# Patient Record
Sex: FEMALE | Race: WHITE | Employment: OTHER | URBAN - METROPOLITAN AREA
[De-identification: names, ages, dates, MRNs, and addresses within clinical notes are randomized per-mention and may not be internally consistent; named-entity substitution may affect disease eponyms.]

---

## 2017-01-05 DIAGNOSIS — E78.5 HYPERLIPIDEMIA, UNSPECIFIED HYPERLIPIDEMIA TYPE: ICD-10-CM

## 2017-01-05 RX ORDER — LISINOPRIL 2.5 MG/1
2.5 TABLET ORAL DAILY
Qty: 30 TABLET | Refills: 0 | Status: SHIPPED | OUTPATIENT
Start: 2017-01-05 | End: 2017-01-23 | Stop reason: SDUPTHER

## 2017-01-05 RX ORDER — METOPROLOL SUCCINATE 25 MG/1
25 TABLET, EXTENDED RELEASE ORAL DAILY
Qty: 30 TABLET | Refills: 0 | Status: SHIPPED | OUTPATIENT
Start: 2017-01-05 | End: 2017-01-23 | Stop reason: SDUPTHER

## 2017-01-05 RX ORDER — ATORVASTATIN CALCIUM 40 MG/1
TABLET, FILM COATED ORAL
Qty: 30 TABLET | Refills: 0 | Status: SHIPPED | OUTPATIENT
Start: 2017-01-05 | End: 2017-01-23 | Stop reason: SDUPTHER

## 2017-01-23 ENCOUNTER — OFFICE VISIT (OUTPATIENT)
Dept: CARDIOLOGY CLINIC | Age: 67
End: 2017-01-23

## 2017-01-23 VITALS
DIASTOLIC BLOOD PRESSURE: 76 MMHG | BODY MASS INDEX: 26.82 KG/M2 | SYSTOLIC BLOOD PRESSURE: 118 MMHG | HEIGHT: 65 IN | HEART RATE: 72 BPM | OXYGEN SATURATION: 97 % | WEIGHT: 161 LBS

## 2017-01-23 DIAGNOSIS — E78.49 OTHER HYPERLIPIDEMIA: ICD-10-CM

## 2017-01-23 DIAGNOSIS — I25.10 CORONARY ARTERY DISEASE INVOLVING NATIVE CORONARY ARTERY OF NATIVE HEART WITHOUT ANGINA PECTORIS: Primary | ICD-10-CM

## 2017-01-23 DIAGNOSIS — I10 ESSENTIAL HYPERTENSION: ICD-10-CM

## 2017-01-23 DIAGNOSIS — E78.5 HYPERLIPIDEMIA, UNSPECIFIED HYPERLIPIDEMIA TYPE: ICD-10-CM

## 2017-01-23 PROCEDURE — 93000 ELECTROCARDIOGRAM COMPLETE: CPT | Performed by: INTERNAL MEDICINE

## 2017-01-23 PROCEDURE — 99214 OFFICE O/P EST MOD 30 MIN: CPT | Performed by: INTERNAL MEDICINE

## 2017-01-23 RX ORDER — LISINOPRIL 2.5 MG/1
2.5 TABLET ORAL DAILY
Qty: 90 TABLET | Refills: 3 | Status: SHIPPED | OUTPATIENT
Start: 2017-01-23 | End: 2018-02-09 | Stop reason: SDUPTHER

## 2017-01-23 RX ORDER — ATORVASTATIN CALCIUM 40 MG/1
TABLET, FILM COATED ORAL
Qty: 30 TABLET | Refills: 11 | Status: SHIPPED | OUTPATIENT
Start: 2017-01-23 | End: 2018-01-30 | Stop reason: SDUPTHER

## 2017-01-23 RX ORDER — METOPROLOL SUCCINATE 25 MG/1
25 TABLET, EXTENDED RELEASE ORAL DAILY
Qty: 90 TABLET | Refills: 3 | Status: SHIPPED | OUTPATIENT
Start: 2017-01-23 | End: 2018-01-30 | Stop reason: SDUPTHER

## 2017-02-10 RX ORDER — NITROGLYCERIN 0.4 MG/1
0.4 TABLET SUBLINGUAL EVERY 5 MIN PRN
Qty: 25 TABLET | Refills: 1 | Status: SHIPPED | OUTPATIENT
Start: 2017-02-10 | End: 2018-10-22 | Stop reason: SDUPTHER

## 2017-05-17 ENCOUNTER — HOSPITAL ENCOUNTER (OUTPATIENT)
Dept: CT IMAGING | Age: 67
Discharge: HOME | End: 2017-05-17
Attending: INTERNAL MEDICINE | Admitting: INTERNAL MEDICINE

## 2017-05-17 DIAGNOSIS — C64.1 RENAL CELL CARCINOMA OF RIGHT KIDNEY (HCC): ICD-10-CM

## 2017-05-17 DIAGNOSIS — C64.1 MALIGNANT NEOPLASM OF RIGHT KIDNEY, EXCEPT RENAL PELVIS (HCC): ICD-10-CM

## 2017-07-17 ENCOUNTER — OFFICE VISIT (OUTPATIENT)
Dept: CARDIOLOGY CLINIC | Age: 67
End: 2017-07-17

## 2017-07-17 ENCOUNTER — HOSPITAL ENCOUNTER (OUTPATIENT)
Dept: GENERAL RADIOLOGY | Age: 67
Discharge: HOME | End: 2017-07-17
Attending: NURSE PRACTITIONER | Admitting: NURSE PRACTITIONER

## 2017-07-17 VITALS
BODY MASS INDEX: 27.32 KG/M2 | WEIGHT: 164 LBS | HEIGHT: 65 IN | HEART RATE: 79 BPM | SYSTOLIC BLOOD PRESSURE: 138 MMHG | DIASTOLIC BLOOD PRESSURE: 72 MMHG | OXYGEN SATURATION: 96 %

## 2017-07-17 DIAGNOSIS — I10 ESSENTIAL HYPERTENSION: ICD-10-CM

## 2017-07-17 DIAGNOSIS — E78.49 OTHER HYPERLIPIDEMIA: ICD-10-CM

## 2017-07-17 DIAGNOSIS — E78.2 MIXED HYPERLIPIDEMIA: ICD-10-CM

## 2017-07-17 DIAGNOSIS — I25.10 CORONARY ARTERY DISEASE INVOLVING NATIVE CORONARY ARTERY OF NATIVE HEART WITHOUT ANGINA PECTORIS: Primary | ICD-10-CM

## 2017-07-17 LAB
A/G RATIO: 1.1 (ref 1.1–2.2)
ALBUMIN SERPL-MCNC: 3.9 G/DL (ref 3.4–5)
ALP BLD-CCNC: 147 U/L (ref 40–129)
ALT SERPL-CCNC: 11 U/L (ref 10–40)
ANION GAP SERPL CALCULATED.3IONS-SCNC: 15 MMOL/L (ref 3–16)
AST SERPL-CCNC: 38 U/L (ref 15–37)
BILIRUB SERPL-MCNC: 0.5 MG/DL (ref 0–1)
BUN BLDV-MCNC: 22 MG/DL (ref 7–20)
CALCIUM SERPL-MCNC: 9.7 MG/DL (ref 8.3–10.6)
CHLORIDE BLD-SCNC: 101 MMOL/L (ref 99–110)
CHOLESTEROL, TOTAL: 146 MG/DL (ref 0–199)
CO2: 20 MMOL/L (ref 21–32)
CREAT SERPL-MCNC: 1 MG/DL (ref 0.6–1.2)
GFR AFRICAN AMERICAN: >60
GFR NON-AFRICAN AMERICAN: 56
GLOBULIN: 3.4 G/DL
GLUCOSE BLD-MCNC: 76 MG/DL (ref 70–99)
HDLC SERPL-MCNC: 60 MG/DL (ref 40–60)
LDL CHOLESTEROL CALCULATED: 68 MG/DL
POTASSIUM SERPL-SCNC: 5.2 MMOL/L (ref 3.5–5.1)
SODIUM BLD-SCNC: 136 MMOL/L (ref 136–145)
TOTAL PROTEIN: 7.3 G/DL (ref 6.4–8.2)
TRIGL SERPL-MCNC: 89 MG/DL (ref 0–150)
VLDLC SERPL CALC-MCNC: 18 MG/DL

## 2017-07-17 PROCEDURE — 99214 OFFICE O/P EST MOD 30 MIN: CPT | Performed by: NURSE PRACTITIONER

## 2017-07-20 ENCOUNTER — TELEPHONE (OUTPATIENT)
Dept: CARDIOLOGY CLINIC | Age: 67
End: 2017-07-20

## 2017-07-20 DIAGNOSIS — R89.9 ABNORMAL LABORATORY TEST RESULT: Primary | ICD-10-CM

## 2017-08-25 ENCOUNTER — HOSPITAL ENCOUNTER (OUTPATIENT)
Dept: GENERAL RADIOLOGY | Age: 67
Discharge: HOME | End: 2017-08-25
Attending: INTERNAL MEDICINE | Admitting: INTERNAL MEDICINE

## 2017-08-25 DIAGNOSIS — R89.9 ABNORMAL LABORATORY TEST RESULT: ICD-10-CM

## 2017-08-25 LAB
A/G RATIO: 1.6 (ref 1.1–2.2)
ALBUMIN SERPL-MCNC: 4.3 G/DL (ref 3.4–5)
ALP BLD-CCNC: 146 U/L (ref 40–129)
ALT SERPL-CCNC: 10 U/L (ref 10–40)
ANION GAP SERPL CALCULATED.3IONS-SCNC: 14 MMOL/L (ref 3–16)
AST SERPL-CCNC: 27 U/L (ref 15–37)
BILIRUB SERPL-MCNC: 0.5 MG/DL (ref 0–1)
BUN BLDV-MCNC: 24 MG/DL (ref 7–20)
CALCIUM SERPL-MCNC: 9.8 MG/DL (ref 8.3–10.6)
CHLORIDE BLD-SCNC: 100 MMOL/L (ref 99–110)
CO2: 27 MMOL/L (ref 21–32)
CREAT SERPL-MCNC: 1 MG/DL (ref 0.6–1.2)
GFR AFRICAN AMERICAN: >60
GFR NON-AFRICAN AMERICAN: 56
GLOBULIN: 2.7 G/DL
GLUCOSE BLD-MCNC: 80 MG/DL (ref 70–99)
POTASSIUM SERPL-SCNC: 4.9 MMOL/L (ref 3.5–5.1)
SODIUM BLD-SCNC: 141 MMOL/L (ref 136–145)
TOTAL PROTEIN: 7 G/DL (ref 6.4–8.2)

## 2017-08-28 ENCOUNTER — TELEPHONE (OUTPATIENT)
Dept: CARDIOLOGY CLINIC | Age: 67
End: 2017-08-28

## 2017-11-13 ENCOUNTER — HOSPITAL ENCOUNTER (OUTPATIENT)
Dept: CT IMAGING | Age: 67
Discharge: HOME | End: 2017-11-13
Attending: INTERNAL MEDICINE | Admitting: INTERNAL MEDICINE

## 2017-11-13 DIAGNOSIS — C64.1 MALIGNANT NEOPLASM OF RIGHT KIDNEY, EXCEPT RENAL PELVIS (HCC): ICD-10-CM

## 2017-11-13 DIAGNOSIS — C64.1 RENAL CELL CARCINOMA OF RIGHT KIDNEY (HCC): ICD-10-CM

## 2018-01-30 DIAGNOSIS — E78.5 HYPERLIPIDEMIA, UNSPECIFIED HYPERLIPIDEMIA TYPE: ICD-10-CM

## 2018-01-31 RX ORDER — ATORVASTATIN CALCIUM 40 MG/1
TABLET, FILM COATED ORAL
Qty: 30 TABLET | Refills: 11 | Status: SHIPPED | OUTPATIENT
Start: 2018-01-31 | End: 2018-02-22 | Stop reason: SDUPTHER

## 2018-01-31 RX ORDER — METOPROLOL SUCCINATE 25 MG/1
25 TABLET, EXTENDED RELEASE ORAL DAILY
Qty: 90 TABLET | Refills: 0 | Status: SHIPPED | OUTPATIENT
Start: 2018-01-31 | End: 2018-02-22 | Stop reason: SDUPTHER

## 2018-02-12 RX ORDER — LISINOPRIL 2.5 MG/1
TABLET ORAL
Qty: 30 TABLET | Refills: 2 | Status: SHIPPED | OUTPATIENT
Start: 2018-02-12 | End: 2018-02-22 | Stop reason: SDUPTHER

## 2018-02-22 ENCOUNTER — OFFICE VISIT (OUTPATIENT)
Dept: CARDIOLOGY CLINIC | Age: 68
End: 2018-02-22

## 2018-02-22 VITALS
HEART RATE: 75 BPM | DIASTOLIC BLOOD PRESSURE: 78 MMHG | SYSTOLIC BLOOD PRESSURE: 124 MMHG | WEIGHT: 166 LBS | BODY MASS INDEX: 27.66 KG/M2 | OXYGEN SATURATION: 97 % | HEIGHT: 65 IN

## 2018-02-22 DIAGNOSIS — I10 ESSENTIAL HYPERTENSION: ICD-10-CM

## 2018-02-22 DIAGNOSIS — I25.10 CORONARY ARTERY DISEASE INVOLVING NATIVE CORONARY ARTERY OF NATIVE HEART WITHOUT ANGINA PECTORIS: Primary | ICD-10-CM

## 2018-02-22 DIAGNOSIS — E78.5 HYPERLIPIDEMIA, UNSPECIFIED HYPERLIPIDEMIA TYPE: ICD-10-CM

## 2018-02-22 DIAGNOSIS — I51.9 LEFT VENTRICULAR DYSFUNCTION: ICD-10-CM

## 2018-02-22 PROCEDURE — 99214 OFFICE O/P EST MOD 30 MIN: CPT | Performed by: INTERNAL MEDICINE

## 2018-02-22 RX ORDER — METOPROLOL SUCCINATE 25 MG/1
25 TABLET, EXTENDED RELEASE ORAL DAILY
Qty: 90 TABLET | Refills: 3 | Status: SHIPPED | OUTPATIENT
Start: 2018-02-22 | End: 2019-03-12 | Stop reason: SDUPTHER

## 2018-02-22 RX ORDER — LISINOPRIL 2.5 MG/1
TABLET ORAL
Qty: 90 TABLET | Refills: 3 | Status: SHIPPED | OUTPATIENT
Start: 2018-02-22 | End: 2019-04-09 | Stop reason: SDUPTHER

## 2018-02-22 RX ORDER — ATORVASTATIN CALCIUM 40 MG/1
TABLET, FILM COATED ORAL
Qty: 90 TABLET | Refills: 3 | Status: CANCELLED | OUTPATIENT
Start: 2018-02-22

## 2018-02-22 RX ORDER — ATORVASTATIN CALCIUM 40 MG/1
TABLET, FILM COATED ORAL
Qty: 30 TABLET | Refills: 11 | Status: SHIPPED | OUTPATIENT
Start: 2018-02-22 | End: 2019-03-12 | Stop reason: SDUPTHER

## 2018-02-22 NOTE — PROGRESS NOTES
Section of Cardiology                                     Cardiovascular Evaluation      PATIENT: Vandana Bill  DATE: 2018  MRN: [id number]  CSN: [id number]  : 1950    Primary Care Doctor: Alondra Teixeira MD    Reason for evaluation:   6 Month Follow-Up and Coronary Artery Disease    History of present illness:  Vandana Bill is a 74 y.o. patient who presents for hospital follow up of chest pain. She underwent a stress test 6/22/15 which was abnormal.  Cardiac cath 6/22/15 HERNANDO to LAD and Diag2 with balloon PTCA. Today she states that she feels well from a cardiac standpoint. Denies further episodes of chest pain. She also denied dyspnea, edema or palpitations. Since the last visit she has been diagnosed with large right renal mass and underwent right radical nephrectomy on 16. Pathology showed it was renal cell carcinoma. Today she is here for follow up of CAD, HTN, HLD. She reports she feels well overall. She denies chest pain, shortness of breath, dizziness or syncope. Past Medical History:   has a past medical history of Arthritis; CAD (coronary artery disease); Cancer ([address] Chest pain; Family history of early CAD; Hyperlipidemia; Hypertension; and Knee pain. Surgical History:   has a past surgical history that includes  section; Percutaneous Transluminal Coronary Angio (2015); eye surgery; Coronary angioplasty with stent (2015); Cardiac surgery; and total nephrectomy (Right, 16). Social History:   reports that she has never smoked. She has never used smokeless tobacco. She reports that she does not drink alcohol or use drugs. Family History:  No evidence for sudden cardiac death or premature CAD    Home Medications:  Reviewed and are listed in nursing record.  and/or listed below  Current Outpatient Prescriptions   Medication Sig Dispense Refill    lisinopril (PRINIVIL;ZESTRIL) 2.5 MG tablet TAKE ONE TABLET BY MOUTH DAILY 30 tablet 2    metoprolol succinate (TOPROL XL) 25 MG extended release tablet Take 1 tablet by mouth daily 90 tablet 0    atorvastatin (LIPITOR) 40 MG tablet TAKE 1 TABLET BY MOUTH ONE TIME A DAY 30 tablet 11    nitroGLYCERIN (NITROSTAT) 0.4 MG SL tablet Place 1 tablet under the tongue every 5 minutes as needed for Chest pain 25 tablet 1    Coenzyme Q10 (CO Q 10 PO) Take by mouth      vitamin D (CHOLECALCIFEROL) 1000 UNIT TABS tablet Take 1,000 Units by mouth daily 3-4 times a week      aspirin 81 MG chewable tablet Take 1 tablet by mouth daily 30 tablet 3    Multiple Vitamin (MULTIVITAMINS PO) Take  by mouth. No current facility-administered medications for this visit. Allergies:  Neosporin [bacitracin-neomycin-polymyxin]     Review of Systems:   All 14 point review of symptoms completed. Pertinent positives identified in the HPI, all other review of symptoms negative.     Physical Examination:    /78   Pulse 75   Ht 5' 5\" (1.651 m)   Wt 166 lb (75.3 kg)   SpO2 97%   BMI 27.62 kg/m²          General Appearance:  Alert, cooperative, no distress, appears stated age       Head:  Normocephalic, without obvious abnormality, atraumatic   Eyes:  PERRL, conjunctiva/corneas clear       Nose: Nares normal, no drainage or sinus tenderness   Throat: Lips, mucosa, and tongue normal       Neck: Supple, symmetrical, trachea midline, no adenopathy, thyroid: not enlarged, symmetric, no tenderness/mass/nodules, no carotid bruit or JVD       Lungs:   Clear to auscultation bilaterally, respirations unlabored   Chest Wall:  No tenderness or deformity       Heart:  Regular rhythm and normal rate; S1, S2 are normal; no murmur noted; no rub or gallop       Abdomen:   Soft, non-tender, bowel sounds active all four quadrants,  no masses, no organomegaly       Extremities: Extremities normal, atraumatic, no cyanosis or edema   Pulses: 2+ and symmetric       Skin: Skin color, texture, turgor normal, no

## 2018-02-22 NOTE — PATIENT INSTRUCTIONS
Recommendation:  - She is doing well with no chest pain. She will continue low dose Asa, Toprol, Lisinopril as well as moderate dose Lipitor. Last LDL in July 17' was at goal. Will repeat her lipid panel in July. - Her BP is controlled on Lisinopril. - I will see her back in 12 months.

## 2018-05-10 ENCOUNTER — HOSPITAL ENCOUNTER (OUTPATIENT)
Dept: CT IMAGING | Age: 68
Discharge: HOME | End: 2018-05-10
Attending: NURSE PRACTITIONER | Admitting: NURSE PRACTITIONER

## 2018-05-10 DIAGNOSIS — C64.1: ICD-10-CM

## 2018-05-10 DIAGNOSIS — N28.89 KIDNEY MASS: ICD-10-CM

## 2018-10-22 ENCOUNTER — OFFICE VISIT (OUTPATIENT)
Dept: CARDIOLOGY CLINIC | Age: 68
End: 2018-10-22
Payer: COMMERCIAL

## 2018-10-22 VITALS
OXYGEN SATURATION: 97 % | HEIGHT: 65 IN | WEIGHT: 164.6 LBS | HEART RATE: 66 BPM | DIASTOLIC BLOOD PRESSURE: 70 MMHG | BODY MASS INDEX: 27.42 KG/M2 | SYSTOLIC BLOOD PRESSURE: 110 MMHG

## 2018-10-22 DIAGNOSIS — E78.49 OTHER HYPERLIPIDEMIA: Primary | ICD-10-CM

## 2018-10-22 DIAGNOSIS — E78.5 HYPERLIPIDEMIA, UNSPECIFIED HYPERLIPIDEMIA TYPE: ICD-10-CM

## 2018-10-22 DIAGNOSIS — I10 ESSENTIAL HYPERTENSION: ICD-10-CM

## 2018-10-22 DIAGNOSIS — I25.10 CORONARY ARTERY DISEASE INVOLVING NATIVE CORONARY ARTERY OF NATIVE HEART WITHOUT ANGINA PECTORIS: ICD-10-CM

## 2018-10-22 PROCEDURE — 99214 OFFICE O/P EST MOD 30 MIN: CPT | Performed by: INTERNAL MEDICINE

## 2018-10-22 RX ORDER — NITROGLYCERIN 0.4 MG/1
0.4 TABLET SUBLINGUAL EVERY 5 MIN PRN
Qty: 25 TABLET | Refills: 1 | Status: SHIPPED | OUTPATIENT
Start: 2018-10-22

## 2018-10-22 NOTE — LETTER
[address] Cardiology - [facility] [place] [id number]  Phone: [phone]  Fax: [phone]    Nino Babin MD        2018     Kenji العلي, [facility]    Patient: Dora Bell  MR Number: [id number]  YOB: 1950  Date of Visit: 10/22/2018    Dear Dr. Kenji العلي:     Below are the relevant portions of my assessment and plan of care.         [address]   Cardiovascular Evaluation     PATIENT: Isa Russelll: 10/22/2018  MRN: [id number]  CSN: [id number]  : 1950     Primary Care Doctor: Kenji العلي MD     Reason for evaluation/Chief complaint:   Chest Pain (2 weeks ago previous patient of Dr. Kathleen Mendiola)        Subjective:     History of present illness on initial date of evaluation:              Dora Bell is a 79 y.o. patient who presents for follow up. She was previously followed by Dr. Kathleen Mendiola for coronary artery disease involving an abnormal stress test 2015 and an angiogram and stents to her LAD and Diag 2, balloon PTCA 2015. Her echocardiogram from 10/2015 showed an ejection fraction of 55-60%. Today she reports that she had an episode of chest pain a few weeks ago. She attributes this to the weather at the time. She also reports that she was working outside in her yard. She has had no recurrence since then. She tolerates activity well, walking her dog daily. Kendy Womacksantosgerman reports that she has been taking her medications as prescribed.      Assessment:  79 y.o. patient with:      Problem List Items Addressed This Visit      HLD (hyperlipidemia) - Primary     Coronary artery disease involving native coronary artery of native heart without angina pectoris     HTN (hypertension)             Plan:  1. I recommend that the patient continue their currently prescribed medications.  Their drug modifiable risk factors appear to be well

## 2018-10-22 NOTE — PROGRESS NOTES
[address] [facility]   Cardiovascular Evaluation    PATIENT: Hans Restrepo  DATE: 10/22/2018  MRN: [id number]  CSN: [id number]  : 1950    Primary Care Doctor: Alla Mcclellan MD    Reason for evaluation/Chief complaint:   Chest Pain (2 weeks ago previous patient of Dr. Miguel Collins)      Subjective:    History of present illness on initial date of evaluation:   Hans Restrepo is a 79 y.o. patient who presents for follow up. She was previously followed by Dr. Miguel Collins for coronary artery disease involving an abnormal stress test 2015 and an angiogram and stents to her LAD and Diag 2, balloon PTCA 2015. Her echocardiogram from 10/2015 showed an ejection fraction of 55-60%. Today she reports that she had an episode of chest pain a few weeks ago. She attributes this to the weather at the time. She also reports that she was working outside in her yard. She has had no recurrence since then. She tolerates activity well, walking her dog daily. Deya Addy reports that she has been taking her medications as prescribed. Patient Active Problem List   Diagnosis    Localized osteoarthrosis, lower leg    Chondromalacia of patella    Knee pain    BP (high blood pressure)    HLD (hyperlipidemia)    Tear of lateral cartilage or meniscus of knee, current    Chest pain    Coronary artery disease involving native coronary artery of native heart without angina pectoris    HTN (hypertension)    S/P PTCA (percutaneous transluminal coronary angioplasty)    Urinary retention    Hematuria    Acute blood loss anemia    Renal mass    Renal cell carcinoma of right kidney (HCC)    Iron deficiency anemia due to chronic blood loss         Cardiac Testing: I have reviewed the findings below. EKG:  ECHO:   STRESS TEST:  CATH:  BYPASS:  VASCULAR:    Past Medical History:   has a past medical history of Arthritis; CAD (coronary artery disease); Cancer ([facility] [address] Chest pain; Family history of early CAD;  Hyperlipidemia; obtained from the patient  General ROS: negative for - chills, fever or night sweats  Psychological ROS: negative for - disorientation or hallucinations  Ophthalmic ROS: negative for - dry eyes, eye pain or loss of vision  ENT ROS: negative for - nasal discharge or sore throat  Allergy and Immunology ROS: negative for - hives or itchy/watery eyes  Hematological and Lymphatic ROS: negative for - jaundice or night sweats  Endocrine ROS: negative for - mood swings or temperature intolerance  Breast ROS: deferred  Respiratory ROS: negative for - hemoptysis or stridor  Cardiovascular ROS: negative for - chest pain, dyspnea on exertion or palpitations  Gastrointestinal ROS: no abdominal pain, change in bowel habits, or black or bloody stools  Genito-Urinary ROS: no dysuria, trouble voiding, or hematuria  Musculoskeletal ROS: negative for - gait disturbance, joint pain or joint stiffness  Neurological ROS: negative for - seizures or speech problems  Dermatological ROS: negative for - rash or skin lesion changes      Physical Examination:    /70   Pulse 66   Ht 5' 5\" (1.651 m)   Wt 164 lb 9.6 oz (74.7 kg)   SpO2 97%   BMI 27.39 kg/m²   /70   Pulse 66   Ht 5' 5\" (1.651 m)   Wt 164 lb 9.6 oz (74.7 kg)   SpO2 97%   BMI 27.39 kg/m²    Weight: 164 lb 9.6 oz (74.7 kg)     Wt Readings from Last 3 Encounters:   10/22/18 164 lb 9.6 oz (74.7 kg)   02/22/18 166 lb (75.3 kg)   02/02/18 150 lb (68 kg)     No intake or output data in the 24 hours ending 10/22/18 1106    General Appearance:  Alert, cooperative, no distress, appears stated age   Head:  Normocephalic, without obvious abnormality, atraumatic   Eyes:  PERRL, conjunctiva/corneas clear       Nose: Nares normal, no drainage or sinus tenderness   Throat: Lips, mucosa, and tongue normal   Neck: Supple, symmetrical, trachea midline, no adenopathy, thyroid: not enlarged, symmetric, no tenderness/mass/nodules, no carotid bruit or JVD       Lungs:   Clear to This note was scribed in the presence of Blane Gunter MD, by Kamla Major RN.      I, Dr. Blane Gunter, personally performed the services described in this documentation, as scribed by the above signed scribe in my presence. It is both accurate and complete to my knowledge. I agree with the details independently gathered by the clinical support staff, while the remaining scribed note accurately describes my personal service to the patient. All questions and concerns were addressed to the patient/family. Alternatives to my treatment were discussed. The note was completed using EMR. Every effort was made to ensure accuracy; however, inadvertent computerized transcription errors may be present.     Blane Gunter MD, [address], [place], [place]  [phone] Critical access hospital  [phone] [facility]  10/22/2018  11:06 AM

## 2018-11-19 ENCOUNTER — HOSPITAL ENCOUNTER (OUTPATIENT)
Dept: CT IMAGING | Age: 68
Discharge: HOME | End: 2018-11-19
Payer: COMMERCIAL

## 2018-11-19 DIAGNOSIS — N28.89 RENAL MASS: ICD-10-CM

## 2018-11-19 DIAGNOSIS — R91.1 LUNG NODULE: ICD-10-CM

## 2018-11-19 PROCEDURE — 74176 CT ABD & PELVIS W/O CONTRAST: CPT

## 2018-11-19 PROCEDURE — 71250 CT THORAX DX C-: CPT

## 2019-03-12 DIAGNOSIS — E78.5 HYPERLIPIDEMIA, UNSPECIFIED HYPERLIPIDEMIA TYPE: ICD-10-CM

## 2019-03-12 DIAGNOSIS — I10 HYPERTENSION, UNSPECIFIED TYPE: Primary | ICD-10-CM

## 2019-03-12 RX ORDER — METOPROLOL SUCCINATE 25 MG/1
25 TABLET, EXTENDED RELEASE ORAL DAILY
Qty: 90 TABLET | Refills: 3 | Status: SHIPPED | OUTPATIENT
Start: 2019-03-12 | End: 2020-03-06

## 2019-03-12 RX ORDER — ATORVASTATIN CALCIUM 40 MG/1
TABLET, FILM COATED ORAL
Qty: 90 TABLET | Refills: 3 | Status: SHIPPED | OUTPATIENT
Start: 2019-03-12 | End: 2019-05-20 | Stop reason: SDUPTHER

## 2019-04-08 ENCOUNTER — TELEPHONE (OUTPATIENT)
Dept: CARDIOLOGY CLINIC | Age: 69
End: 2019-04-08

## 2019-04-08 NOTE — TELEPHONE ENCOUNTER
Pt needs refill of lisinopril (PRINIVIL;ZESTRIL) 2.5 MG tablet sent to [facility] and [employer] . Last ov 10/22/18. Pt states she is out of the med.

## 2019-04-09 RX ORDER — LISINOPRIL 2.5 MG/1
TABLET ORAL
Qty: 90 TABLET | Refills: 3 | Status: SHIPPED | OUTPATIENT
Start: 2019-04-09 | End: 2020-04-06

## 2019-05-20 ENCOUNTER — HOSPITAL ENCOUNTER (OUTPATIENT)
Dept: CT IMAGING | Age: 69
Discharge: HOME | End: 2019-05-20
Payer: MEDICARE

## 2019-05-20 DIAGNOSIS — C64.1 RENAL CELL CARCINOMA OF RIGHT KIDNEY (HCC): ICD-10-CM

## 2019-05-20 DIAGNOSIS — E78.5 HYPERLIPIDEMIA, UNSPECIFIED HYPERLIPIDEMIA TYPE: ICD-10-CM

## 2019-05-20 PROCEDURE — 6360000004 HC RX CONTRAST MEDICATION: Performed by: INTERNAL MEDICINE

## 2019-05-20 PROCEDURE — 74176 CT ABD & PELVIS W/O CONTRAST: CPT

## 2019-05-20 RX ADMIN — IOHEXOL 50 ML: 240 INJECTION, SOLUTION INTRATHECAL; INTRAVASCULAR; INTRAVENOUS; ORAL at 14:36

## 2019-05-21 RX ORDER — ATORVASTATIN CALCIUM 40 MG/1
TABLET, FILM COATED ORAL
Qty: 90 TABLET | Refills: 5 | Status: SHIPPED | OUTPATIENT
Start: 2019-05-21 | End: 2020-06-15

## 2019-08-23 ENCOUNTER — HOSPITAL ENCOUNTER (EMERGENCY)
Age: 69
Discharge: HOME | DRG: 563 | End: 2019-08-23
Payer: MEDICARE

## 2019-08-23 ENCOUNTER — APPOINTMENT (OUTPATIENT)
Dept: GENERAL RADIOLOGY | Age: 69
DRG: 563 | End: 2019-08-23
Payer: MEDICARE

## 2019-08-23 ENCOUNTER — APPOINTMENT (OUTPATIENT)
Dept: CT IMAGING | Age: 69
DRG: 563 | End: 2019-08-23
Payer: MEDICARE

## 2019-08-23 VITALS
OXYGEN SATURATION: 100 % | BODY MASS INDEX: 24.99 KG/M2 | HEART RATE: 61 BPM | HEIGHT: 65 IN | DIASTOLIC BLOOD PRESSURE: 68 MMHG | SYSTOLIC BLOOD PRESSURE: 148 MMHG | RESPIRATION RATE: 18 BRPM | TEMPERATURE: 97 F | WEIGHT: 150 LBS
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
## 2019-08-23 DIAGNOSIS — S82.141A TIBIAL PLATEAU FRACTURE, RIGHT, CLOSED, INITIAL ENCOUNTER: ICD-10-CM

## 2019-08-23 DIAGNOSIS — S82.831A CLOSED FRACTURE FIBULA, HEAD, RIGHT, INITIAL ENCOUNTER: Primary | ICD-10-CM

## 2019-08-23 PROCEDURE — 73700 CT LOWER EXTREMITY W/O DYE: CPT

## 2019-08-23 PROCEDURE — 6370000000 HC RX 637 (ALT 250 FOR IP): Performed by: NURSE PRACTITIONER

## 2019-08-23 PROCEDURE — 73590 X-RAY EXAM OF LOWER LEG: CPT

## 2019-08-23 PROCEDURE — 99284 EMERGENCY DEPT VISIT MOD MDM: CPT

## 2019-08-23 RX ORDER — HYDROCODONE BITARTRATE AND ACETAMINOPHEN 5; 325 MG/1; MG/1
1 TABLET ORAL EVERY 6 HOURS PRN
Qty: 10 TABLET | Refills: 0 | Status: ON HOLD | OUTPATIENT
Start: 2019-08-23 | End: 2019-08-26 | Stop reason: HOSPADM

## 2019-08-23 RX ORDER — HYDROCODONE BITARTRATE AND ACETAMINOPHEN 5; 325 MG/1; MG/1
1 TABLET ORAL ONCE
Status: COMPLETED | OUTPATIENT
Start: 2019-08-23 | End: 2019-08-23

## 2019-08-23 RX ADMIN — HYDROCODONE BITARTRATE AND ACETAMINOPHEN 1 TABLET: 5; 325 TABLET ORAL at 15:41

## 2019-08-23 ASSESSMENT — ENCOUNTER SYMPTOMS
COLOR CHANGE: 0
BACK PAIN: 0
ABDOMINAL PAIN: 0
WHEEZING: 0
COUGH: 0
SHORTNESS OF BREATH: 0
DIARRHEA: 0
VOMITING: 0
NAUSEA: 0

## 2019-08-23 ASSESSMENT — PAIN DESCRIPTION - LOCATION: LOCATION: LEG

## 2019-08-23 ASSESSMENT — PAIN SCALES - GENERAL
PAINLEVEL_OUTOF10: 7
PAINLEVEL_OUTOF10: 7
PAINLEVEL_OUTOF10: 5

## 2019-08-23 ASSESSMENT — PAIN DESCRIPTION - DESCRIPTORS: DESCRIPTORS: CONSTANT

## 2019-08-23 ASSESSMENT — PAIN DESCRIPTION - ORIENTATION: ORIENTATION: RIGHT;LOWER

## 2019-08-23 ASSESSMENT — PAIN DESCRIPTION - PAIN TYPE: TYPE: ACUTE PAIN

## 2019-08-23 NOTE — ED PROVIDER NOTES
lipohemarthrosis. 4. Mild lateral compartment degenerative changes. XR TIBIA FIBULA RIGHT (2 VIEWS)   Final Result   Closed comminuted intra-articular fracture of the proximal right tibia with   involvement of the lateral tibial plateau and possibly the medial tibial   plateau. Dedicated right knee radiographs recommended. Closed comminuted fracture of the head/neck of the right fibula. Xr Tibia Fibula Right (2 Views)    Result Date: 8/23/2019  EXAMINATION: 3 XRAY VIEWS OF THE RIGHT TIBIA AND FIBULA 8/23/2019 2:14 pm COMPARISON: None. HISTORY: ORDERING SYSTEM PROVIDED HISTORY: fall from trailer twisting leg TECHNOLOGIST PROVIDED HISTORY: Reason for exam:->fall from trailer twisting leg Reason for Exam: Patient fell off back of boat trailer and twisted leg - pain is mostly at upper fibula (lateral side) Acuity: Acute Type of Exam: Initial FINDINGS: There is a comminuted intra-articular fracture of the lateral tibial plateau which extends to the tibial spine. There is also a subjacent comminuted closed fracture of the head/neck of the right fibula. The ankle mortise is intact. The bones are osteopenic. Fracture of the medial tibial plateau is also likely present. Dedicated knee radiographs are recommended. Closed comminuted intra-articular fracture of the proximal right tibia with involvement of the lateral tibial plateau and possibly the medial tibial plateau. Dedicated right knee radiographs recommended. Closed comminuted fracture of the head/neck of the right fibula.          MEDICAL DECISION MAKING / ED COURSE:      PROCEDURES:   Procedures    None    Patient was given:  Medications   HYDROcodone-acetaminophen (NORCO) 5-325 MG per tablet 1 tablet (1 tablet Oral Given 8/23/19 9038)     Patient presents today with right proximal tib/fib pain, states that she fell onto the ground, when she went to step back off of the boat trailer, she states that when she fell, she landed on her visit well. I evaluated the patient. The physician was available for consultation as needed. The patient and / or the family were informed of the results of anytests, a time was given to answer questions, a plan was proposed and they agreed with plan. She verbalized understanding of discharge instructions and was discharged from the department in stable condition. CLINICAL IMPRESSION:  1. Closed fracture fibula, head, right, initial encounter    2. Tibial plateau fracture, right, closed, initial encounter    3. Fall, initial encounter        DISPOSITION Decision To Discharge 08/23/2019 05:23:15 PM      PATIENT REFERRED TO:  Wilner Ware MD  [address] [place] [address] [address] [facility] [address]  [phone]    Schedule an appointment as soon as possible for a visit   As needed    Garrett Hobbs MD  [facility] [address] [facility] [address]  [phone]    Schedule an appointment as soon as possible for a visit in 2 days  This is a referral to Ortho, please call Monday make an appointment for reevaluation      DISCHARGE MEDICATIONS:  Discharge Medication List as of 8/23/2019  5:25 PM      START taking these medications    Details   HYDROcodone-acetaminophen (NORCO) 5-325 MG per tablet Take 1 tablet by mouth every 6 hours as needed for Pain for up to 3 days. , Disp-10 tablet, R-0Print             DISCONTINUED MEDICATIONS:  Discharge Medication List as of 8/23/2019  5:25 PM      STOP taking these medications       vitamin D (CHOLECALCIFEROL) 1000 UNIT TABS tablet Comments:   Reason for Stopping:         Multiple Vitamin (MULTIVITAMINS PO) Comments:   Reason for Stopping:                      (Please note the MDM and HPI sections of this note were completed with a voice recognition program.  Efforts weremade to edit the dictations but occasionally words are mis-transcribed.)    Electronically signed, INNA Ramsey CNP,           INNA Ramsey CNP  08/23/19 4105

## 2019-08-24 ENCOUNTER — APPOINTMENT (OUTPATIENT)
Dept: GENERAL RADIOLOGY | Age: 69
DRG: 563 | End: 2019-08-24
Payer: MEDICARE

## 2019-08-24 ENCOUNTER — HOSPITAL ENCOUNTER (INPATIENT)
Age: 69
LOS: 3 days | Discharge: HOME | DRG: 563 | End: 2019-08-27
Attending: EMERGENCY MEDICINE | Admitting: INTERNAL MEDICINE
Payer: MEDICARE

## 2019-08-24 DIAGNOSIS — S82.141D CLOSED FRACTURE OF RIGHT TIBIAL PLATEAU WITH ROUTINE HEALING, SUBSEQUENT ENCOUNTER: Primary | ICD-10-CM

## 2019-08-24 DIAGNOSIS — S82.831D CLOSED FRACTURE OF HEAD OF RIGHT FIBULA WITH ROUTINE HEALING, SUBSEQUENT ENCOUNTER: ICD-10-CM

## 2019-08-24 PROBLEM — S82.401D TIBIA/FIBULA FRACTURE, RIGHT, CLOSED, WITH ROUTINE HEALING, SUBSEQUENT ENCOUNTER: Status: ACTIVE | Noted: 2019-08-24

## 2019-08-24 PROBLEM — S82.409A TIBIA/FIBULA FRACTURE: Status: ACTIVE | Noted: 2019-08-24

## 2019-08-24 PROBLEM — S82.209A TIBIA/FIBULA FRACTURE: Status: ACTIVE | Noted: 2019-08-24

## 2019-08-24 PROBLEM — S82.201D TIBIA/FIBULA FRACTURE, RIGHT, CLOSED, WITH ROUTINE HEALING, SUBSEQUENT ENCOUNTER: Status: ACTIVE | Noted: 2019-08-24

## 2019-08-24 LAB
ANION GAP SERPL CALCULATED.3IONS-SCNC: 13 MMOL/L (ref 3–16)
BASOPHILS ABSOLUTE: 0 K/UL (ref 0–0.2)
BASOPHILS RELATIVE PERCENT: 0.2 %
BUN BLDV-MCNC: 30 MG/DL (ref 7–20)
CALCIUM SERPL-MCNC: 9.1 MG/DL (ref 8.3–10.6)
CHLORIDE BLD-SCNC: 101 MMOL/L (ref 99–110)
CO2: 23 MMOL/L (ref 21–32)
CREAT SERPL-MCNC: 1.1 MG/DL (ref 0.6–1.2)
EOSINOPHILS ABSOLUTE: 0 K/UL (ref 0–0.6)
EOSINOPHILS RELATIVE PERCENT: 0.2 %
GFR AFRICAN AMERICAN: 60
GFR NON-AFRICAN AMERICAN: 50
GLUCOSE BLD-MCNC: 138 MG/DL (ref 70–99)
HCT VFR BLD CALC: 32.2 % (ref 36–48)
HEMOGLOBIN: 11.2 G/DL (ref 12–16)
INR BLD: 1.25 (ref 0.86–1.14)
IRON SATURATION: 15 % (ref 15–50)
IRON: 38 UG/DL (ref 37–145)
LYMPHOCYTES ABSOLUTE: 0.6 K/UL (ref 1–5.1)
LYMPHOCYTES RELATIVE PERCENT: 8.8 %
MCH RBC QN AUTO: 31.4 PG (ref 26–34)
MCHC RBC AUTO-ENTMCNC: 34.9 G/DL (ref 31–36)
MCV RBC AUTO: 90.2 FL (ref 80–100)
MONOCYTES ABSOLUTE: 0.8 K/UL (ref 0–1.3)
MONOCYTES RELATIVE PERCENT: 12.1 %
NEUTROPHILS ABSOLUTE: 5.1 K/UL (ref 1.7–7.7)
NEUTROPHILS RELATIVE PERCENT: 78.7 %
PDW BLD-RTO: 14 % (ref 12.4–15.4)
PLATELET # BLD: 134 K/UL (ref 135–450)
PMV BLD AUTO: 9.4 FL (ref 5–10.5)
POTASSIUM REFLEX MAGNESIUM: 4.1 MMOL/L (ref 3.5–5.1)
PROTHROMBIN TIME: 14.2 SEC (ref 9.8–13)
RBC # BLD: 3.57 M/UL (ref 4–5.2)
SODIUM BLD-SCNC: 137 MMOL/L (ref 136–145)
TOTAL IRON BINDING CAPACITY: 261 UG/DL (ref 260–445)
WBC # BLD: 6.4 K/UL (ref 4–11)

## 2019-08-24 PROCEDURE — 99284 EMERGENCY DEPT VISIT MOD MDM: CPT

## 2019-08-24 PROCEDURE — 83550 IRON BINDING TEST: CPT

## 2019-08-24 PROCEDURE — 96375 TX/PRO/DX INJ NEW DRUG ADDON: CPT

## 2019-08-24 PROCEDURE — 36415 COLL VENOUS BLD VENIPUNCTURE: CPT

## 2019-08-24 PROCEDURE — 82306 VITAMIN D 25 HYDROXY: CPT

## 2019-08-24 PROCEDURE — 6370000000 HC RX 637 (ALT 250 FOR IP): Performed by: PHYSICIAN ASSISTANT

## 2019-08-24 PROCEDURE — 6360000002 HC RX W HCPCS: Performed by: NURSE PRACTITIONER

## 2019-08-24 PROCEDURE — 2580000003 HC RX 258: Performed by: NURSE PRACTITIONER

## 2019-08-24 PROCEDURE — 73552 X-RAY EXAM OF FEMUR 2/>: CPT

## 2019-08-24 PROCEDURE — 96374 THER/PROPH/DIAG INJ IV PUSH: CPT

## 2019-08-24 PROCEDURE — 6360000002 HC RX W HCPCS: Performed by: EMERGENCY MEDICINE

## 2019-08-24 PROCEDURE — 83540 ASSAY OF IRON: CPT

## 2019-08-24 PROCEDURE — 6360000002 HC RX W HCPCS: Performed by: PHYSICIAN ASSISTANT

## 2019-08-24 PROCEDURE — 6370000000 HC RX 637 (ALT 250 FOR IP): Performed by: NURSE PRACTITIONER

## 2019-08-24 PROCEDURE — 80048 BASIC METABOLIC PNL TOTAL CA: CPT

## 2019-08-24 PROCEDURE — 1200000000 HC SEMI PRIVATE

## 2019-08-24 PROCEDURE — 6370000000 HC RX 637 (ALT 250 FOR IP): Performed by: INTERNAL MEDICINE

## 2019-08-24 PROCEDURE — 85610 PROTHROMBIN TIME: CPT

## 2019-08-24 PROCEDURE — 85025 COMPLETE CBC W/AUTO DIFF WBC: CPT

## 2019-08-24 RX ORDER — LISINOPRIL 2.5 MG/1
2.5 TABLET ORAL DAILY
Status: DISCONTINUED | OUTPATIENT
Start: 2019-08-24 | End: 2019-08-27 | Stop reason: HOSPADM

## 2019-08-24 RX ORDER — HYDROCODONE BITARTRATE AND ACETAMINOPHEN 5; 325 MG/1; MG/1
1 TABLET ORAL EVERY 6 HOURS PRN
Status: DISCONTINUED | OUTPATIENT
Start: 2019-08-24 | End: 2019-08-24

## 2019-08-24 RX ORDER — HYDROCODONE BITARTRATE AND ACETAMINOPHEN 5; 325 MG/1; MG/1
2 TABLET ORAL EVERY 4 HOURS PRN
Status: DISCONTINUED | OUTPATIENT
Start: 2019-08-24 | End: 2019-08-27 | Stop reason: HOSPADM

## 2019-08-24 RX ORDER — SODIUM CHLORIDE 0.9 % (FLUSH) 0.9 %
10 SYRINGE (ML) INJECTION EVERY 12 HOURS SCHEDULED
Status: DISCONTINUED | OUTPATIENT
Start: 2019-08-24 | End: 2019-08-27 | Stop reason: HOSPADM

## 2019-08-24 RX ORDER — CYCLOBENZAPRINE HCL 10 MG
10 TABLET ORAL 3 TIMES DAILY PRN
Status: DISCONTINUED | OUTPATIENT
Start: 2019-08-24 | End: 2019-08-25

## 2019-08-24 RX ORDER — ATORVASTATIN CALCIUM 40 MG/1
40 TABLET, FILM COATED ORAL NIGHTLY
Status: DISCONTINUED | OUTPATIENT
Start: 2019-08-24 | End: 2019-08-27 | Stop reason: HOSPADM

## 2019-08-24 RX ORDER — SODIUM CHLORIDE 0.9 % (FLUSH) 0.9 %
10 SYRINGE (ML) INJECTION PRN
Status: DISCONTINUED | OUTPATIENT
Start: 2019-08-24 | End: 2019-08-27 | Stop reason: HOSPADM

## 2019-08-24 RX ORDER — ASPIRIN 81 MG/1
81 TABLET, CHEWABLE ORAL DAILY
Status: DISCONTINUED | OUTPATIENT
Start: 2019-08-24 | End: 2019-08-27 | Stop reason: HOSPADM

## 2019-08-24 RX ORDER — ONDANSETRON 2 MG/ML
4 INJECTION INTRAMUSCULAR; INTRAVENOUS ONCE
Status: DISCONTINUED | OUTPATIENT
Start: 2019-08-24 | End: 2019-08-24

## 2019-08-24 RX ORDER — FENTANYL CITRATE 50 UG/ML
50 INJECTION, SOLUTION INTRAMUSCULAR; INTRAVENOUS ONCE
Status: COMPLETED | OUTPATIENT
Start: 2019-08-24 | End: 2019-08-24

## 2019-08-24 RX ORDER — SODIUM CHLORIDE 9 MG/ML
INJECTION, SOLUTION INTRAVENOUS CONTINUOUS
Status: DISCONTINUED | OUTPATIENT
Start: 2019-08-24 | End: 2019-08-25

## 2019-08-24 RX ORDER — ONDANSETRON 2 MG/ML
4 INJECTION INTRAMUSCULAR; INTRAVENOUS EVERY 6 HOURS PRN
Status: DISCONTINUED | OUTPATIENT
Start: 2019-08-24 | End: 2019-08-27 | Stop reason: HOSPADM

## 2019-08-24 RX ORDER — METOPROLOL SUCCINATE 25 MG/1
25 TABLET, EXTENDED RELEASE ORAL DAILY
Status: DISCONTINUED | OUTPATIENT
Start: 2019-08-24 | End: 2019-08-27 | Stop reason: HOSPADM

## 2019-08-24 RX ORDER — HYDROCODONE BITARTRATE AND ACETAMINOPHEN 5; 325 MG/1; MG/1
1 TABLET ORAL EVERY 4 HOURS PRN
Status: DISCONTINUED | OUTPATIENT
Start: 2019-08-24 | End: 2019-08-27 | Stop reason: HOSPADM

## 2019-08-24 RX ORDER — DIAZEPAM 5 MG/1
5 TABLET ORAL ONCE
Status: COMPLETED | OUTPATIENT
Start: 2019-08-24 | End: 2019-08-24

## 2019-08-24 RX ORDER — ONDANSETRON 2 MG/ML
4 INJECTION INTRAMUSCULAR; INTRAVENOUS ONCE
Status: COMPLETED | OUTPATIENT
Start: 2019-08-24 | End: 2019-08-24

## 2019-08-24 RX ADMIN — HYDROMORPHONE HYDROCHLORIDE 0.5 MG: 1 INJECTION, SOLUTION INTRAMUSCULAR; INTRAVENOUS; SUBCUTANEOUS at 07:33

## 2019-08-24 RX ADMIN — DIAZEPAM 5 MG: 5 TABLET ORAL at 03:11

## 2019-08-24 RX ADMIN — ENOXAPARIN SODIUM 40 MG: 40 INJECTION SUBCUTANEOUS at 09:11

## 2019-08-24 RX ADMIN — CYCLOBENZAPRINE HYDROCHLORIDE 10 MG: 10 TABLET, FILM COATED ORAL at 21:44

## 2019-08-24 RX ADMIN — CYCLOBENZAPRINE HYDROCHLORIDE 10 MG: 10 TABLET, FILM COATED ORAL at 10:28

## 2019-08-24 RX ADMIN — HYDROMORPHONE HYDROCHLORIDE 1 MG: 1 INJECTION, SOLUTION INTRAMUSCULAR; INTRAVENOUS; SUBCUTANEOUS at 04:29

## 2019-08-24 RX ADMIN — VITAMIN D, TAB 1000IU (100/BT) 1000 UNITS: 25 TAB at 14:09

## 2019-08-24 RX ADMIN — HYDROMORPHONE HYDROCHLORIDE 0.5 MG: 1 INJECTION, SOLUTION INTRAMUSCULAR; INTRAVENOUS; SUBCUTANEOUS at 14:10

## 2019-08-24 RX ADMIN — HYDROMORPHONE HYDROCHLORIDE 0.5 MG: 1 INJECTION, SOLUTION INTRAMUSCULAR; INTRAVENOUS; SUBCUTANEOUS at 02:22

## 2019-08-24 RX ADMIN — METOPROLOL SUCCINATE 25 MG: 25 TABLET, EXTENDED RELEASE ORAL at 09:11

## 2019-08-24 RX ADMIN — SODIUM CHLORIDE: 9 INJECTION, SOLUTION INTRAVENOUS at 17:42

## 2019-08-24 RX ADMIN — HYDROCODONE BITARTRATE AND ACETAMINOPHEN 1 TABLET: 5; 325 TABLET ORAL at 10:28

## 2019-08-24 RX ADMIN — LISINOPRIL 2.5 MG: 2.5 TABLET ORAL at 09:10

## 2019-08-24 RX ADMIN — HYDROMORPHONE HYDROCHLORIDE 0.5 MG: 1 INJECTION, SOLUTION INTRAMUSCULAR; INTRAVENOUS; SUBCUTANEOUS at 20:57

## 2019-08-24 RX ADMIN — SODIUM CHLORIDE: 9 INJECTION, SOLUTION INTRAVENOUS at 04:45

## 2019-08-24 RX ADMIN — CYCLOBENZAPRINE HYDROCHLORIDE 10 MG: 10 TABLET, FILM COATED ORAL at 04:52

## 2019-08-24 RX ADMIN — HYDROCODONE BITARTRATE AND ACETAMINOPHEN 1 TABLET: 5; 325 TABLET ORAL at 10:41

## 2019-08-24 RX ADMIN — ONDANSETRON 4 MG: 2 INJECTION INTRAMUSCULAR; INTRAVENOUS at 02:22

## 2019-08-24 RX ADMIN — ASPIRIN 81 MG 81 MG: 81 TABLET ORAL at 09:11

## 2019-08-24 RX ADMIN — Medication 10 ML: at 09:12

## 2019-08-24 RX ADMIN — FENTANYL CITRATE 50 MCG: 50 INJECTION, SOLUTION INTRAMUSCULAR; INTRAVENOUS at 03:11

## 2019-08-24 RX ADMIN — HYDROCODONE BITARTRATE AND ACETAMINOPHEN 2 TABLET: 5; 325 TABLET ORAL at 17:38

## 2019-08-24 RX ADMIN — ATORVASTATIN CALCIUM 40 MG: 40 TABLET, FILM COATED ORAL at 21:44

## 2019-08-24 ASSESSMENT — PAIN SCALES - GENERAL
PAINLEVEL_OUTOF10: 10
PAINLEVEL_OUTOF10: 8
PAINLEVEL_OUTOF10: 10
PAINLEVEL_OUTOF10: 7
PAINLEVEL_OUTOF10: 7
PAINLEVEL_OUTOF10: 8
PAINLEVEL_OUTOF10: 10
PAINLEVEL_OUTOF10: 7
PAINLEVEL_OUTOF10: 6
PAINLEVEL_OUTOF10: 7
PAINLEVEL_OUTOF10: 7
PAINLEVEL_OUTOF10: 8
PAINLEVEL_OUTOF10: 7
PAINLEVEL_OUTOF10: 10

## 2019-08-24 ASSESSMENT — ENCOUNTER SYMPTOMS
COLOR CHANGE: 1
ABDOMINAL PAIN: 0
SHORTNESS OF BREATH: 0
NAUSEA: 1
VOMITING: 0
EYES NEGATIVE: 1

## 2019-08-24 ASSESSMENT — PAIN DESCRIPTION - PAIN TYPE
TYPE: ACUTE PAIN

## 2019-08-24 ASSESSMENT — PAIN DESCRIPTION - ORIENTATION
ORIENTATION: RIGHT
ORIENTATION: LOWER;RIGHT
ORIENTATION: LOWER;RIGHT
ORIENTATION: RIGHT
ORIENTATION: RIGHT;LOWER

## 2019-08-24 ASSESSMENT — PAIN DESCRIPTION - LOCATION
LOCATION: LEG

## 2019-08-24 ASSESSMENT — PAIN DESCRIPTION - DESCRIPTORS: DESCRIPTORS: CONSTANT

## 2019-08-24 NOTE — DISCHARGE INSTR - COC
Restriction: {CHP DME Yes amt example:946594354}  Last Modified Barium Swallow with Video (Video Swallowing Test): {Done Not Done TASHA:796191617}    Treatments at the Time of Hospital Discharge:   Respiratory Treatments: ***  Oxygen Therapy:  {Therapy; copd oxygen:97921}  Ventilator:    {MH CC Vent ICNN:562138262}    Rehab Therapies: PT, OT  Weight Bearing Status/Restrictions: [facility] CC Weight Bearin}  Other Medical Equipment (for information only, NOT a DME order):  {EQUIPMENT:765915615}  Other Treatments: ***    Patient's personal belongings (please select all that are sent with patient):  {CHP DME Belongings:591601571}    RN SIGNATURE:  {Esignature:[id number]}    CASE MANAGEMENT/SOCIAL WORK SECTION    Inpatient Status Date: ***    Readmission Risk Assessment Score:  Readmission Risk              Risk of Unplanned Readmission:        14           Discharging to Facility/ Agency   Name:  [facility] care    Address: [address] [place] [address], [address] [place] [address]  Phone: [phone]  Fax: [phone]    ·     / signature: {Esignature:[id number]}    PHYSICIAN SECTION    Prognosis: Good    Condition at Discharge: Stable    Rehab Potential (if transferring to Rehab): Good    Recommended Labs or Other Treatments After Discharge: None    Physician Certification: I certify the above information and transfer of Alok Irby  is necessary for the continuing treatment of the diagnosis listed and that she requires Home Care for less 30 days.      Update Admission H&P: No change in H&P    PHYSICIAN SIGNATURE:  Electronically signed by Yvette Conti MD on 19 at 2:13 PM

## 2019-08-24 NOTE — CONSULTS
seconds. Positive toe wiggle. No pain with passive range of motion of the toes. Pain with active motion of the ankle, but able to wiggle her toes. Left lower extremity: No signs of trauma. No pain with palpation range of motion and neurovascularly intact    Bilateral upper extremities: No pain with range of motion or palpation and neurovascular exam within normal limits    DATA:      CBC with Differential:    Lab Results   Component Value Date    WBC 6.4 08/24/2019    RBC 3.57 08/24/2019    RBC 4.57 11/22/2016    HGB 11.2 08/24/2019    HCT 32.2 08/24/2019     08/24/2019    MCV 90.2 08/24/2019    MCH 31.4 08/24/2019    MCHC 34.9 08/24/2019    RDW 14.0 08/24/2019    LYMPHOPCT 8.8 08/24/2019    LYMPHOPCT 37.8 11/22/2016    MONOPCT 12.1 08/24/2019    BASOPCT 0.2 08/24/2019    MONOSABS 0.8 08/24/2019    LYMPHSABS 0.6 08/24/2019    EOSABS 0.0 08/24/2019    BASOSABS 0.0 08/24/2019     CMP:    Lab Results   Component Value Date     08/24/2019    K 4.1 08/24/2019     08/24/2019    CO2 23 08/24/2019    BUN 30 08/24/2019    CREATININE 1.1 08/24/2019    GFRAA 60 08/24/2019    AGRATIO 1.6 08/25/2017    LABGLOM 50 08/24/2019    GLUCOSE 138 08/24/2019    GLUCOSE 102 08/24/2016    PROT 7.0 08/25/2017    PROT 6.8 08/24/2016    CALCIUM 9.1 08/24/2019    BILITOT 0.5 08/25/2017    ALKPHOS 146 08/25/2017    AST 27 08/25/2017    ALT 10 08/25/2017     BMP:    Lab Results   Component Value Date     08/24/2019    K 4.1 08/24/2019     08/24/2019    CO2 23 08/24/2019    BUN 30 08/24/2019    CREATININE 1.1 08/24/2019    CALCIUM 9.1 08/24/2019    GFRAA 60 08/24/2019    LABGLOM 50 08/24/2019    GLUCOSE 138 08/24/2019    GLUCOSE 102 08/24/2016     Protime 14. 2High   9.8 - 13.0 sec Final 08/24/2019  1:57 AM [facility]   Effective 5-31-18 09:00am EST   Please note reference ranges have   changed for PT and INR Testing. INR 1. 25High   0.86 - 1.14 Final 08/24/2019  1:57 AM [facility]         Radiology:   CT OF THE RIGHT KNEE WITHOUT CONTRAST 8/23/2019 4:14 pm       TECHNIQUE:   CT of the right knee was performed without the administration of intravenous   contrast.  Multiplanar reformatted images are provided for review. Dose   modulation, iterative reconstruction, and/or weight based adjustment of the   mA/kV was utilized to reduce the radiation dose to as low as reasonably   achievable.       COMPARISON:   Right tibia/fibula radiographs 08/23/2019.       HISTORY   ORDERING SYSTEM PROVIDED HISTORY: fall, tib fib fx   TECHNOLOGIST PROVIDED HISTORY:   Reason for Exam: fell today, known fractures from plain films, no prev surgery   Acuity: Acute   Type of Exam: Initial       FINDINGS:   Bones: There is an acute mildly comminuted fracture involving the anterior   and posterior aspects of the lateral tibial plateau with up to 4 mm   depression of the subchondral bone plate in the posterior aspect.  The   fracture extends the tibial spine mildly comminuted fracture components   involving the central and posterior aspects of medial tibial plateau with up   to approximately 2 mm depression of the subchondral bone plate.  The fracture   involves the posterior aspect of the proximal tibial metaphysis.       An acute comminuted mildly displaced fibular head and neck fracture is also   seen.  No other fracture identified.  No dislocation.       Soft Tissue:  The extensor mechanism is intact.  Periarticular soft tissue   edema.  No radiopaque foreign body or soft tissue gas.       Joint:  Large lipohemarthrosis.  No popliteal cyst.  Mild lateral compartment   degenerative changes.  No joint body.           Impression   1. Acute mildly comminuted and depressed fracture of the medial and lateral   tibial plateau as detailed above. 2. Acute comminuted mildly displaced fibular head and neck fracture. 3. Large lipohemarthrosis. 4. Mild lateral compartment degenerative changes.        3 XRAY VIEWS OF THE

## 2019-08-24 NOTE — ED PROVIDER NOTES
(\"from pain\"). Negative for abdominal pain and vomiting. Genitourinary: Negative. Musculoskeletal: Positive for arthralgias (right knee), gait problem, joint swelling (right knee) and myalgias (right leg). Skin: Positive for color change (bruising to right leg). Negative for wound. Neurological: Negative for numbness. All other systems reviewed and are negative. Exceptas noted above in the ROS, all other systems were reviewed and negative. PAST MEDICAL HISTORY:     Past Medical History:   Diagnosis Date    Arthritis     legs and knees    CAD (coronary artery disease)     Cancer (HCC)     kidney cancer    Chest pain 2015    + Troponins/ Abn Stress Test/ PTCA    Family history of early CAD     Hyperlipidemia     Hypertension     Knee pain     right         SURGICAL HISTORY:      Past Surgical History:   Procedure Laterality Date    CARDIAC SURGERY       SECTION      CORONARY ANGIOPLASTY WITH STENT PLACEMENT  2015    EYE SURGERY      PTCA  2015    95% occlusion- pLAD, HERNANDO 2.5x 16    TOTAL NEPHRECTOMY Right 16         CURRENT MEDICATIONS:       Previous Medications    ASPIRIN 81 MG CHEWABLE TABLET    Take 1 tablet by mouth daily    ATORVASTATIN (LIPITOR) 40 MG TABLET    TAKE 1 TABLET BY MOUTH ONE TIME A DAY    COENZYME Q10 (CO Q 10 PO)    Take by mouth    HYDROCODONE-ACETAMINOPHEN (NORCO) 5-325 MG PER TABLET    Take 1 tablet by mouth every 6 hours as needed for Pain for up to 3 days.     LISINOPRIL (PRINIVIL;ZESTRIL) 2.5 MG TABLET    TAKE ONE TABLET BY MOUTH DAILY    METOPROLOL SUCCINATE (TOPROL XL) 25 MG EXTENDED RELEASE TABLET    Take 1 tablet by mouth daily    NITROGLYCERIN (NITROSTAT) 0.4 MG SL TABLET    Place 1 tablet under the tongue every 5 minutes as needed for Chest pain         ALLERGIES:    Neosporin [bacitracin-neomycin-polymyxin]    FAMILY HISTORY:       Family History   Problem Relation Age of Onset    Diabetes Father     Heart Disease scleral icterus. PERRL. EOM's grossly intact. NECK: Supple. Normal ROM. CARDIOVASCULAR: RRR. No Murmer. Intact distal pulses. PULMONARY/CHEST WALL: Effort normal. No tachypnea. Lungs clear to ausculation. ABDOMEN: Normal BS. Soft. Nondistended. No tenderness to palpate. No guarding. /ANORECTAL: Not assessed  MUSKULOSKELETAL: Right lower extremity: There is a moderate swelling to the knee and proximal tib/fib. Obvious joint effusion noted. Significant decrease in range of motion of knee secondary to pain. The patient has exquisite tenderness to the proximal tip/fib where there is market swelling though still compressibility of muscle groups/compartments. Increased pain with attempted range of motion of ankle. Patient able to wiggle her toes. 2+ pedal pulse palpated. Sensation intact distal to fracture site. No acute deformities. SKIN: Warm and dry. No rash. Bruising beginning to develop around the proximal right tib-fib. Skin remains intact. NEUROLOGICAL: Alert and oriented x 3. GCS 15. CN II-XII grossly intact. Strength is 5/5 in all extremities (with the exception of right lower extremity which is difficult to assess due to injury/pain) and sensation is intact.    PSYCHIATRIC: Normal affect        DIAGNOSTICRESULTS:     LABS:    Results for orders placed or performed during the hospital encounter of 08/24/19   CBC Auto Differential   Result Value Ref Range    WBC 6.4 4.0 - 11.0 K/uL    RBC 3.57 (L) 4.00 - 5.20 M/uL    Hemoglobin 11.2 (L) 12.0 - 16.0 g/dL    Hematocrit 32.2 (L) 36.0 - 48.0 %    MCV 90.2 80.0 - 100.0 fL    MCH 31.4 26.0 - 34.0 pg    MCHC 34.9 31.0 - 36.0 g/dL    RDW 14.0 12.4 - 15.4 %    Platelets 951 (L) 736 - 450 K/uL    MPV 9.4 5.0 - 10.5 fL    Neutrophils % 78.7 %    Lymphocytes % 8.8 %    Monocytes % 12.1 %    Eosinophils % 0.2 %    Basophils % 0.2 %    Neutrophils Absolute 5.1 1.7 - 7.7 K/uL    Lymphocytes Absolute 0.6 (L) 1.0 - 5.1 K/uL    Monocytes Absolute 0.8 0.0 - 1.3

## 2019-08-24 NOTE — PROGRESS NOTES
4 Eyes Skin Assessment     The patient is being assess for   Admission    I agree that 2 RN's have performed a thorough Head to Toe Skin Assessment on the patient. ALL assessment sites listed below have been assessed. Areas assessed by both nurses:   [x]   Head, Face, and Ears   [x]   Shoulders, Back, and Chest, Abdomen  [x]   Arms, Elbows, and Hands   [x]   Coccyx, Sacrum, and Ischium  [x]   Legs, Feet, and Heels        Mepilex placed on sacrum for prevention    **SHARE this note so that the co-signing nurse is able to place an eSignature**    Co-signer eSignature: Electronically signed by Darwin Wilson RN on 8/24/19 at 6:32 AM    Does the Patient have Skin Breakdown?   No          Killian Prevention initiated:  No   Wound Care Orders initiated:  No      [facility] nurse consulted for Pressure Injury (Stage 3,4, Unstageable, DTI, NWPT, Complex wounds)and New or Established Ostomies:  No      Primary Nurse eSignature: Electronically signed by Marcus Jaimes RN on 8/24/19 at 4:31 AM

## 2019-08-25 LAB
ANION GAP SERPL CALCULATED.3IONS-SCNC: 8 MMOL/L (ref 3–16)
BASOPHILS ABSOLUTE: 0 K/UL (ref 0–0.2)
BASOPHILS RELATIVE PERCENT: 0.3 %
BUN BLDV-MCNC: 23 MG/DL (ref 7–20)
CALCIUM SERPL-MCNC: 8.5 MG/DL (ref 8.3–10.6)
CHLORIDE BLD-SCNC: 103 MMOL/L (ref 99–110)
CO2: 24 MMOL/L (ref 21–32)
CREAT SERPL-MCNC: 0.9 MG/DL (ref 0.6–1.2)
EOSINOPHILS ABSOLUTE: 0 K/UL (ref 0–0.6)
EOSINOPHILS RELATIVE PERCENT: 0.5 %
GFR AFRICAN AMERICAN: >60
GFR NON-AFRICAN AMERICAN: >60
GLUCOSE BLD-MCNC: 106 MG/DL (ref 70–99)
HCT VFR BLD CALC: 30 % (ref 36–48)
HEMOGLOBIN: 10.2 G/DL (ref 12–16)
LYMPHOCYTES ABSOLUTE: 0.6 K/UL (ref 1–5.1)
LYMPHOCYTES RELATIVE PERCENT: 10.4 %
MCH RBC QN AUTO: 31.3 PG (ref 26–34)
MCHC RBC AUTO-ENTMCNC: 34.1 G/DL (ref 31–36)
MCV RBC AUTO: 91.8 FL (ref 80–100)
MONOCYTES ABSOLUTE: 0.6 K/UL (ref 0–1.3)
MONOCYTES RELATIVE PERCENT: 10 %
NEUTROPHILS ABSOLUTE: 4.8 K/UL (ref 1.7–7.7)
NEUTROPHILS RELATIVE PERCENT: 78.8 %
PDW BLD-RTO: 14.1 % (ref 12.4–15.4)
PLATELET # BLD: 121 K/UL (ref 135–450)
PMV BLD AUTO: 8.8 FL (ref 5–10.5)
POTASSIUM REFLEX MAGNESIUM: 4.8 MMOL/L (ref 3.5–5.1)
RBC # BLD: 3.27 M/UL (ref 4–5.2)
SODIUM BLD-SCNC: 135 MMOL/L (ref 136–145)
VITAMIN D 25-HYDROXY: 29.8 NG/ML
WBC # BLD: 6.1 K/UL (ref 4–11)

## 2019-08-25 PROCEDURE — 6360000002 HC RX W HCPCS: Performed by: INTERNAL MEDICINE

## 2019-08-25 PROCEDURE — 97166 OT EVAL MOD COMPLEX 45 MIN: CPT

## 2019-08-25 PROCEDURE — 6370000000 HC RX 637 (ALT 250 FOR IP): Performed by: NURSE PRACTITIONER

## 2019-08-25 PROCEDURE — 1200000000 HC SEMI PRIVATE

## 2019-08-25 PROCEDURE — APPSS45 APP SPLIT SHARED TIME 31-45 MINUTES: Performed by: PHYSICIAN ASSISTANT

## 2019-08-25 PROCEDURE — 97162 PT EVAL MOD COMPLEX 30 MIN: CPT

## 2019-08-25 PROCEDURE — 6370000000 HC RX 637 (ALT 250 FOR IP): Performed by: PHYSICIAN ASSISTANT

## 2019-08-25 PROCEDURE — 2580000003 HC RX 258: Performed by: INTERNAL MEDICINE

## 2019-08-25 PROCEDURE — 85025 COMPLETE CBC W/AUTO DIFF WBC: CPT

## 2019-08-25 PROCEDURE — 6360000002 HC RX W HCPCS: Performed by: NURSE PRACTITIONER

## 2019-08-25 PROCEDURE — 97116 GAIT TRAINING THERAPY: CPT

## 2019-08-25 PROCEDURE — 2580000003 HC RX 258: Performed by: NURSE PRACTITIONER

## 2019-08-25 PROCEDURE — 80048 BASIC METABOLIC PNL TOTAL CA: CPT

## 2019-08-25 PROCEDURE — 36415 COLL VENOUS BLD VENIPUNCTURE: CPT

## 2019-08-25 PROCEDURE — 6370000000 HC RX 637 (ALT 250 FOR IP): Performed by: INTERNAL MEDICINE

## 2019-08-25 PROCEDURE — 97530 THERAPEUTIC ACTIVITIES: CPT

## 2019-08-25 RX ORDER — DOCUSATE SODIUM 100 MG/1
100 CAPSULE, LIQUID FILLED ORAL DAILY
Status: DISCONTINUED | OUTPATIENT
Start: 2019-08-25 | End: 2019-08-27 | Stop reason: HOSPADM

## 2019-08-25 RX ORDER — GABAPENTIN 100 MG/1
100 CAPSULE ORAL 3 TIMES DAILY
Status: DISCONTINUED | OUTPATIENT
Start: 2019-08-25 | End: 2019-08-27 | Stop reason: HOSPADM

## 2019-08-25 RX ORDER — FAMOTIDINE 20 MG/1
20 TABLET, FILM COATED ORAL DAILY
Status: DISCONTINUED | OUTPATIENT
Start: 2019-08-25 | End: 2019-08-27 | Stop reason: HOSPADM

## 2019-08-25 RX ORDER — IBUPROFEN 800 MG/1
800 TABLET ORAL EVERY 6 HOURS PRN
Status: DISCONTINUED | OUTPATIENT
Start: 2019-08-25 | End: 2019-08-27 | Stop reason: HOSPADM

## 2019-08-25 RX ORDER — CYCLOBENZAPRINE HCL 10 MG
10 TABLET ORAL 3 TIMES DAILY
Status: DISCONTINUED | OUTPATIENT
Start: 2019-08-25 | End: 2019-08-27 | Stop reason: HOSPADM

## 2019-08-25 RX ADMIN — ASPIRIN 81 MG 81 MG: 81 TABLET ORAL at 09:52

## 2019-08-25 RX ADMIN — VITAMIN D, TAB 1000IU (100/BT) 1000 UNITS: 25 TAB at 09:52

## 2019-08-25 RX ADMIN — HYDROCODONE BITARTRATE AND ACETAMINOPHEN 2 TABLET: 5; 325 TABLET ORAL at 21:54

## 2019-08-25 RX ADMIN — SODIUM CHLORIDE: 9 INJECTION, SOLUTION INTRAVENOUS at 06:00

## 2019-08-25 RX ADMIN — IRON SUCROSE 200 MG: 20 INJECTION, SOLUTION INTRAVENOUS at 12:59

## 2019-08-25 RX ADMIN — CYCLOBENZAPRINE HYDROCHLORIDE 10 MG: 10 TABLET, FILM COATED ORAL at 21:55

## 2019-08-25 RX ADMIN — Medication 10 ML: at 21:55

## 2019-08-25 RX ADMIN — HYDROCODONE BITARTRATE AND ACETAMINOPHEN 2 TABLET: 5; 325 TABLET ORAL at 05:50

## 2019-08-25 RX ADMIN — DOCUSATE SODIUM 100 MG: 100 CAPSULE, LIQUID FILLED ORAL at 16:34

## 2019-08-25 RX ADMIN — GABAPENTIN 100 MG: 100 CAPSULE ORAL at 21:55

## 2019-08-25 RX ADMIN — HYDROCODONE BITARTRATE AND ACETAMINOPHEN 2 TABLET: 5; 325 TABLET ORAL at 15:47

## 2019-08-25 RX ADMIN — CYCLOBENZAPRINE HYDROCHLORIDE 10 MG: 10 TABLET, FILM COATED ORAL at 05:50

## 2019-08-25 RX ADMIN — ATORVASTATIN CALCIUM 40 MG: 40 TABLET, FILM COATED ORAL at 21:55

## 2019-08-25 RX ADMIN — CYCLOBENZAPRINE HYDROCHLORIDE 10 MG: 10 TABLET, FILM COATED ORAL at 13:49

## 2019-08-25 RX ADMIN — FAMOTIDINE 20 MG: 20 TABLET ORAL at 12:39

## 2019-08-25 RX ADMIN — GABAPENTIN 100 MG: 100 CAPSULE ORAL at 13:49

## 2019-08-25 RX ADMIN — ENOXAPARIN SODIUM 40 MG: 40 INJECTION SUBCUTANEOUS at 09:52

## 2019-08-25 RX ADMIN — HYDROMORPHONE HYDROCHLORIDE 0.5 MG: 1 INJECTION, SOLUTION INTRAMUSCULAR; INTRAVENOUS; SUBCUTANEOUS at 02:49

## 2019-08-25 ASSESSMENT — PAIN DESCRIPTION - ORIENTATION
ORIENTATION: RIGHT

## 2019-08-25 ASSESSMENT — PAIN SCALES - GENERAL
PAINLEVEL_OUTOF10: 10
PAINLEVEL_OUTOF10: 7
PAINLEVEL_OUTOF10: 10
PAINLEVEL_OUTOF10: 7
PAINLEVEL_OUTOF10: 9
PAINLEVEL_OUTOF10: 10
PAINLEVEL_OUTOF10: 9
PAINLEVEL_OUTOF10: 0

## 2019-08-25 ASSESSMENT — PAIN DESCRIPTION - PAIN TYPE
TYPE: ACUTE PAIN
TYPE: ACUTE PAIN

## 2019-08-25 ASSESSMENT — PAIN DESCRIPTION - LOCATION
LOCATION: LEG

## 2019-08-25 ASSESSMENT — PAIN DESCRIPTION - DESCRIPTORS
DESCRIPTORS: CONSTANT
DESCRIPTORS: CONSTANT

## 2019-08-25 ASSESSMENT — PAIN DESCRIPTION - FREQUENCY: FREQUENCY: CONTINUOUS

## 2019-08-25 NOTE — PROGRESS NOTES
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 Recommended RTS with arms    Objective     AROM RLE (degrees)  RLE General AROM: hip appears WFL; knee not tested due to fracture; ankle limited ~50% d/t pain  AROM LLE (degrees)  LLE AROM : WFL  Strength RLE  Comment: NT due to fracture; ankle DF/PF 3-/5 d/t pain  Strength LLE  Strength LLE: WFL     Sensation  Overall Sensation Status: WFL     Bed mobility  Supine to Sit: Minimal assistance(for RLE, HOB elevated, cues for technique)  Sit to Supine: Unable to assess(pt up in chair at end of session)  Scooting: Stand by assistance     Transfers  Sit to Stand: Minimal Assistance(from EOB at elevated height and toilet; cues for hand placement)  Stand to sit: Minimal Assistance     Ambulation  Ambulation?: Yes  WB Status: NWB RLE  Ambulation 1  Surface: level tile  Device: Standard Walker  Other Apparatus: Knee Immobilizer  Assistance: Minimal assistance  Quality of Gait: Max cues to slow pace of activity in order to maintain. One LOB leaving bathroom requiring min A to correct. Cues for sequencing and step length with LLE.  Pt able to maintain RLE NWB  Distance: 10 ft x 2     Balance  Sitting - Static: Good  Sitting - Dynamic: Good  Standing - Static: Fair  Standing - Dynamic: Fair;-        Plan

## 2019-08-26 PROCEDURE — 6370000000 HC RX 637 (ALT 250 FOR IP): Performed by: NURSE PRACTITIONER

## 2019-08-26 PROCEDURE — 6360000002 HC RX W HCPCS: Performed by: NURSE PRACTITIONER

## 2019-08-26 PROCEDURE — 2580000003 HC RX 258: Performed by: NURSE PRACTITIONER

## 2019-08-26 PROCEDURE — 97535 SELF CARE MNGMENT TRAINING: CPT

## 2019-08-26 PROCEDURE — 97110 THERAPEUTIC EXERCISES: CPT

## 2019-08-26 PROCEDURE — 97116 GAIT TRAINING THERAPY: CPT

## 2019-08-26 PROCEDURE — 6370000000 HC RX 637 (ALT 250 FOR IP): Performed by: PHYSICIAN ASSISTANT

## 2019-08-26 PROCEDURE — 6370000000 HC RX 637 (ALT 250 FOR IP): Performed by: INTERNAL MEDICINE

## 2019-08-26 PROCEDURE — 1200000000 HC SEMI PRIVATE

## 2019-08-26 PROCEDURE — 97530 THERAPEUTIC ACTIVITIES: CPT

## 2019-08-26 RX ORDER — HYDROCODONE BITARTRATE AND ACETAMINOPHEN 5; 325 MG/1; MG/1
1 TABLET ORAL EVERY 6 HOURS PRN
Qty: 28 TABLET | Refills: 0 | Status: SHIPPED | OUTPATIENT
Start: 2019-08-26 | End: 2019-09-02

## 2019-08-26 RX ADMIN — METOPROLOL SUCCINATE 25 MG: 25 TABLET, EXTENDED RELEASE ORAL at 09:06

## 2019-08-26 RX ADMIN — ATORVASTATIN CALCIUM 40 MG: 40 TABLET, FILM COATED ORAL at 20:24

## 2019-08-26 RX ADMIN — CYCLOBENZAPRINE HYDROCHLORIDE 10 MG: 10 TABLET, FILM COATED ORAL at 20:24

## 2019-08-26 RX ADMIN — ASPIRIN 81 MG 81 MG: 81 TABLET ORAL at 09:06

## 2019-08-26 RX ADMIN — Medication 10 ML: at 09:05

## 2019-08-26 RX ADMIN — FAMOTIDINE 20 MG: 20 TABLET ORAL at 09:05

## 2019-08-26 RX ADMIN — GABAPENTIN 100 MG: 100 CAPSULE ORAL at 20:24

## 2019-08-26 RX ADMIN — LISINOPRIL 2.5 MG: 2.5 TABLET ORAL at 09:05

## 2019-08-26 RX ADMIN — GABAPENTIN 100 MG: 100 CAPSULE ORAL at 09:06

## 2019-08-26 RX ADMIN — GABAPENTIN 100 MG: 100 CAPSULE ORAL at 15:41

## 2019-08-26 RX ADMIN — DOCUSATE SODIUM 100 MG: 100 CAPSULE, LIQUID FILLED ORAL at 09:05

## 2019-08-26 RX ADMIN — VITAMIN D, TAB 1000IU (100/BT) 1000 UNITS: 25 TAB at 09:06

## 2019-08-26 RX ADMIN — CYCLOBENZAPRINE HYDROCHLORIDE 10 MG: 10 TABLET, FILM COATED ORAL at 09:06

## 2019-08-26 RX ADMIN — Medication 10 ML: at 20:25

## 2019-08-26 RX ADMIN — HYDROCODONE BITARTRATE AND ACETAMINOPHEN 2 TABLET: 5; 325 TABLET ORAL at 19:30

## 2019-08-26 RX ADMIN — CYCLOBENZAPRINE HYDROCHLORIDE 10 MG: 10 TABLET, FILM COATED ORAL at 15:41

## 2019-08-26 RX ADMIN — ENOXAPARIN SODIUM 40 MG: 40 INJECTION SUBCUTANEOUS at 09:05

## 2019-08-26 RX ADMIN — HYDROCODONE BITARTRATE AND ACETAMINOPHEN 2 TABLET: 5; 325 TABLET ORAL at 05:05

## 2019-08-26 ASSESSMENT — PAIN DESCRIPTION - ORIENTATION
ORIENTATION: RIGHT

## 2019-08-26 ASSESSMENT — PAIN DESCRIPTION - LOCATION
LOCATION: LEG

## 2019-08-26 ASSESSMENT — PAIN DESCRIPTION - PAIN TYPE
TYPE: ACUTE PAIN

## 2019-08-26 ASSESSMENT — PAIN SCALES - GENERAL
PAINLEVEL_OUTOF10: 4
PAINLEVEL_OUTOF10: 4
PAINLEVEL_OUTOF10: 7

## 2019-08-26 NOTE — PROGRESS NOTES
Education & Training, Positioning    AM-PAC Score  AM-PAC Inpatient Daily Activity Raw Score: 16 (08/26/19 1019)  AM-PAC Inpatient ADL T-Scale Score : 35.96 (08/26/19 1019)  ADL Inpatient CMS 0-100% Score: 53.32 (08/26/19 1019)  ADL Inpatient CMS G-Code Modifier : CK (08/26/19 1019)    Goals  Short term goals  Time Frame for Short term goals: 5 days (8/30/19)  Short term goal 1: Min A with toileting. Short term goal 2: SBA with toilet transfer. Short term goal 3: Supervision with supine to sit at EOB. Short term goal 4: Pt to tolerated 15 reps UE ther ex to improve strength and mobility with RW. Short term goal 5: Mod A with LB dressing. Long term goals  Long term goal 1: Modified Independent with toileting. Long term goal 2: Modified Independent with toilet transfer. Long term goal 3: Modified Independent with bed mobility. Long term goal 4: SBA with LB dressing. Patient Goals   Patient goals : To get stronger so she can get home.        Therapy Time   Individual Concurrent Group Co-treatment   Time In 0953         Time Out 1029         Minutes 36         Timed Code Treatment Minutes: [address] [facility]

## 2019-08-26 NOTE — PROGRESS NOTES
Hospitalist Progress Note      PCP: Bryan Harmon MD    Date of Admission: 8/24/2019    Chief Complaint: r knee/leg pain     Subjective: no new c/o. Medications:  Reviewed    Infusion Medications   Scheduled Medications    cyclobenzaprine  10 mg Oral TID    gabapentin  100 mg Oral TID    famotidine  20 mg Oral Daily    docusate sodium  100 mg Oral Daily    aspirin  81 mg Oral Daily    atorvastatin  40 mg Oral Nightly    lisinopril  2.5 mg Oral Daily    metoprolol succinate  25 mg Oral Daily    sodium chloride flush  10 mL Intravenous 2 times per day    enoxaparin  40 mg Subcutaneous Daily    vitamin D  1,000 Units Oral Daily     PRN Meds: ibuprofen, sodium chloride flush, magnesium hydroxide, ondansetron, HYDROcodone 5 mg - acetaminophen **OR** HYDROcodone 5 mg - acetaminophen      Intake/Output Summary (Last 24 hours) at 8/26/2019 0909  Last data filed at 8/25/2019 2154  Gross per 24 hour   Intake 490 ml   Output --   Net 490 ml       Physical Exam Performed:    /61   Pulse 120   Temp 97.8 °F (36.6 °C) (Oral)   Resp 18   Ht 5' 5\" (1.651 m)   Wt 150 lb (68 kg)   SpO2 100%   BMI 24.96 kg/m²     General appearance: No apparent distress, appears stated age and cooperative. HEENT: Pupils equal, round, and reactive to light. Conjunctivae/corneas clear. Neck: Supple, with full range of motion. No jugular venous distention. Trachea midline. Respiratory:  Normal respiratory effort. Clear to auscultation, bilaterally without Rales/Wheezes/Rhonchi. Cardiovascular: Regular rate and rhythm with normal S1/S2 without murmurs, rubs or gallops. Abdomen: Soft, non-tender, non-distended with normal bowel sounds. Musculoskeletal: No clubbing, cyanosis or edema bilaterally. Full range of motion without deformity. Skin: Skin color, texture, turgor normal.  No rashes or lesions. Neurologic:  Neurovascularly intact without any focal sensory/motor deficits.  Cranial nerves: II-XII intact, per orthopaedics. She does no have signs of compartment syndrome. Non-weightbearing right lower extremity. Pain management with scheduled cyclobenzaprine 10 mg TID, gabapentin 100 mg TID, and PRN ibuprofen 800 mg prior to hydrocodone. Discontinued IV Dilaudid. HTN/CAD - w/ known CAD s/p PCI/stent June 2015 but no evidence of active signs/sxs of ischemia/failure. Currently controlled on home meds w/ vitals reviewed and documented as above. HyperLipidemia - controlled on home Statin. Continue, w/ f/u and med adjustment w/ PCP    Hx renal cell carcinoma right kidney s/p nephrectomy (May 2016):  Renal dosing medications and follow BUN/Cr.  Volume expansion with normal saline to avoid pre-renal azotemia.      Hx of iron deficiency anemia (Hgb 11.2) -   Recheck iron saturation, last checked was 11% in 2016 and increased to 15% this admission.  Treat with IV Venofer.       History of vitamin D deficiency:  Check 25-OH vit level and supplement if less than 30.        DVT Prophylaxis: LMWH  Diet: DIET GENERAL;  Code Status: Full Code      PT/OT Eval Status: seen w/ recs for home w/ assist.     Dc Medina - Possibly to home Monday 26 August     Pilo Grigsby MD

## 2019-08-26 NOTE — PROGRESS NOTES
Department of Orthopedic Surgery  Progress Note    Subjective:       Systemic or Specific Complaints: She is having some pain today. Going to do PT again this afternoon. Objective:     Patient Vitals for the past 24 hrs:   BP Temp Temp src Pulse Resp SpO2   08/26/19 1353 110/68 -- -- 97 -- 100 %   08/26/19 1351 110/68 98.1 °F (36.7 °C) Oral 94 16 95 %   08/26/19 0900 116/61 97.8 °F (36.6 °C) Oral 120 18 100 %   08/25/19 2245 125/72 97.7 °F (36.5 °C) Oral 103 16 94 %   08/25/19 2145 128/63 97.7 °F (36.5 °C) Oral 105 16 100 %   08/25/19 1436 (!) 148/72 97.8 °F (36.6 °C) Oral 106 -- 99 %       General: alert, appears stated age and cooperative   Wound: n/a   Motion: Painful range of Motion in affected extremity   DVT Exam: No evidence of DVT seen on physical exam.     Additional exam: compartments soft, compressible  SILT s/sp/dp/s/t  +DF/PF/EHL with difficulty with DF secondary to pain  No pain with passive ROM of toes  2+ DP pulse    Data Review  CBC:   Lab Results   Component Value Date    WBC 6.1 08/25/2019    RBC 3.27 08/25/2019    RBC 4.57 11/22/2016    HGB 10.2 08/25/2019    HCT 30.0 08/25/2019     08/25/2019       Renal:   Lab Results   Component Value Date     08/25/2019    K 4.8 08/25/2019     08/25/2019    CO2 24 08/25/2019    BUN 23 08/25/2019    CREATININE 0.9 08/25/2019    GLUCOSE 106 08/25/2019    GLUCOSE 102 08/24/2016    CALCIUM 8.5 08/25/2019            Assessment:     79year old female with a right tibial plateau fracture    Plan:     1. Pain control  2. Non weight bearing right lower extremity  3. PT/OT  4. Knee immobilizer, will transition to hinged brace as an outpatient  5. Plan for non operative treatment at this time and will follow along as an outpatient    Iraj Paz MD  [address] [facility] ([facility])

## 2019-08-27 VITALS
RESPIRATION RATE: 16 BRPM | HEART RATE: 97 BPM | BODY MASS INDEX: 24.99 KG/M2 | DIASTOLIC BLOOD PRESSURE: 66 MMHG | SYSTOLIC BLOOD PRESSURE: 110 MMHG | TEMPERATURE: 97.6 F | HEIGHT: 65 IN | OXYGEN SATURATION: 99 % | WEIGHT: 150 LBS

## 2019-08-27 PROCEDURE — 6370000000 HC RX 637 (ALT 250 FOR IP): Performed by: PHYSICIAN ASSISTANT

## 2019-08-27 PROCEDURE — 6370000000 HC RX 637 (ALT 250 FOR IP): Performed by: NURSE PRACTITIONER

## 2019-08-27 PROCEDURE — 97530 THERAPEUTIC ACTIVITIES: CPT

## 2019-08-27 PROCEDURE — 2580000003 HC RX 258: Performed by: NURSE PRACTITIONER

## 2019-08-27 PROCEDURE — 6370000000 HC RX 637 (ALT 250 FOR IP): Performed by: INTERNAL MEDICINE

## 2019-08-27 PROCEDURE — 97535 SELF CARE MNGMENT TRAINING: CPT

## 2019-08-27 PROCEDURE — 6360000002 HC RX W HCPCS: Performed by: NURSE PRACTITIONER

## 2019-08-27 RX ADMIN — FAMOTIDINE 20 MG: 20 TABLET ORAL at 10:32

## 2019-08-27 RX ADMIN — GABAPENTIN 100 MG: 100 CAPSULE ORAL at 10:32

## 2019-08-27 RX ADMIN — HYDROCODONE BITARTRATE AND ACETAMINOPHEN 2 TABLET: 5; 325 TABLET ORAL at 13:16

## 2019-08-27 RX ADMIN — METOPROLOL SUCCINATE 25 MG: 25 TABLET, EXTENDED RELEASE ORAL at 10:33

## 2019-08-27 RX ADMIN — VITAMIN D, TAB 1000IU (100/BT) 1000 UNITS: 25 TAB at 10:31

## 2019-08-27 RX ADMIN — Medication 10 ML: at 10:31

## 2019-08-27 RX ADMIN — ASPIRIN 81 MG 81 MG: 81 TABLET ORAL at 10:33

## 2019-08-27 RX ADMIN — LISINOPRIL 2.5 MG: 2.5 TABLET ORAL at 10:32

## 2019-08-27 RX ADMIN — DOCUSATE SODIUM 100 MG: 100 CAPSULE, LIQUID FILLED ORAL at 10:32

## 2019-08-27 RX ADMIN — ENOXAPARIN SODIUM 40 MG: 40 INJECTION SUBCUTANEOUS at 10:31

## 2019-08-27 RX ADMIN — CYCLOBENZAPRINE HYDROCHLORIDE 10 MG: 10 TABLET, FILM COATED ORAL at 10:32

## 2019-08-27 RX ADMIN — HYDROCODONE BITARTRATE AND ACETAMINOPHEN 2 TABLET: 5; 325 TABLET ORAL at 06:53

## 2019-08-27 ASSESSMENT — PAIN SCALES - GENERAL
PAINLEVEL_OUTOF10: 4
PAINLEVEL_OUTOF10: 7
PAINLEVEL_OUTOF10: 7
PAINLEVEL_OUTOF10: 9

## 2019-08-27 ASSESSMENT — PAIN DESCRIPTION - ORIENTATION: ORIENTATION: RIGHT

## 2019-08-27 ASSESSMENT — PAIN DESCRIPTION - PAIN TYPE: TYPE: ACUTE PAIN

## 2019-08-27 ASSESSMENT — PAIN DESCRIPTION - LOCATION: LOCATION: LEG

## 2019-08-27 NOTE — PROGRESS NOTES
Pt's IV line removed without complications. Discussed d/c instructions with patient, given opportunity to ask questions, and provided new medication education with side effects. Follow up appointment information included in d/c instructions. Pt verbalized understanding of d/c instructions. Patient was discharged to home with all belongings and taken outside via wheelchair.     Sky Sawant

## 2019-08-27 NOTE — PROGRESS NOTES
cues for hand placement and technique     Plan   Plan  Times per week: 4-6x/week  Times per day: Daily  Current Treatment Recommendations: Strengthening, Endurance Training, Patient/Caregiver Education & Training, Self-Care / ADL, Balance Training, Home Management Training, Functional Mobility Training, Safety Education & Training, Positioning    AM-PAC Score  AM-PAC Inpatient Daily Activity Raw Score: 17 (08/27/19 1405)  AM-PAC Inpatient ADL T-Scale Score : 37.26 (08/27/19 1405)  ADL Inpatient CMS 0-100% Score: 50.11 (08/27/19 1405)  ADL Inpatient CMS G-Code Modifier : CK (08/27/19 1405)    Goals  Short term goals  Time Frame for Short term goals: 5 days (8/30/19)  Short term goal 1: Min A with toileting. (GOAL MET 8/27/19)  Short term goal 2: SBA with toilet transfer. Short term goal 3: Supervision with supine to sit at EOB. Short term goal 4: Pt to tolerated 15 reps UE ther ex to improve strength and mobility with RW. Short term goal 5: Mod A with LB dressing. Long term goals  Long term goal 1: Modified Independent with toileting. Long term goal 2: Modified Independent with toilet transfer. Long term goal 3: Modified Independent with bed mobility. Long term goal 4: SBA with LB dressing. Patient Goals   Patient goals : To get stronger so she can get home.      Therapy Time   Individual Concurrent Group Co-treatment   Time In 0903         Time Out 0941         Minutes [address], CHATMAN/L

## 2019-08-27 NOTE — PROGRESS NOTES
Hospitalist Progress Note      PCP: Maribell Servin MD    Date of Admission: 8/24/2019    Chief Complaint: R knee/leg pain     Subjective: no new c/o. Medications:  Reviewed    Infusion Medications   Scheduled Medications    cyclobenzaprine  10 mg Oral TID    gabapentin  100 mg Oral TID    famotidine  20 mg Oral Daily    docusate sodium  100 mg Oral Daily    aspirin  81 mg Oral Daily    atorvastatin  40 mg Oral Nightly    lisinopril  2.5 mg Oral Daily    metoprolol succinate  25 mg Oral Daily    sodium chloride flush  10 mL Intravenous 2 times per day    enoxaparin  40 mg Subcutaneous Daily    vitamin D  1,000 Units Oral Daily     PRN Meds: ibuprofen, sodium chloride flush, magnesium hydroxide, ondansetron, HYDROcodone 5 mg - acetaminophen **OR** HYDROcodone 5 mg - acetaminophen      Intake/Output Summary (Last 24 hours) at 8/27/2019 0851  Last data filed at 8/26/2019 1225  Gross per 24 hour   Intake 480 ml   Output --   Net 480 ml       Physical Exam Performed:    /80   Pulse 118   Temp 98.7 °F (37.1 °C) (Oral)   Resp 14   Ht 5' 5\" (1.651 m)   Wt 150 lb (68 kg)   SpO2 95%   BMI 24.96 kg/m²     General appearance: No apparent distress, appears stated age and cooperative. HEENT: Pupils equal, round, and reactive to light. Conjunctivae/corneas clear. Neck: Supple, with full range of motion. No jugular venous distention. Trachea midline. Respiratory:  Normal respiratory effort. Clear to auscultation, bilaterally without Rales/Wheezes/Rhonchi. Cardiovascular: Regular rate and rhythm with normal S1/S2 without murmurs, rubs or gallops. Abdomen: Soft, non-tender, non-distended with normal bowel sounds. Musculoskeletal: No clubbing, cyanosis or edema bilaterally. Full range of motion without deformity. Skin: Skin color, texture, turgor normal.  No rashes or lesions. Neurologic:  Neurovascularly intact without any focal sensory/motor deficits.  Cranial nerves: II-XII intact,

## 2019-08-28 NOTE — DISCHARGE SUMMARY
tablet under the tongue every 5 minutes as needed for Chest pain, Disp-25 tablet, R-1Normal      Coenzyme Q10 (CO Q 10 PO) Take by mouth      aspirin 81 MG chewable tablet Take 1 tablet by mouth daily, Disp-30 tablet, R-3             Time Spent on discharge is more than 30 minutes in the examination, evaluation, counseling and review of medications and discharge plan. Signed:    Caren Fang MD   8/28/2019      Thank you Keyla Sapp MD for the opportunity to be involved in this patient's care. If you have any questions or concerns please feel free to contact me at [phone].

## 2019-08-30 ENCOUNTER — HOSPITAL ENCOUNTER (EMERGENCY)
Age: 69
Discharge: HOME | End: 2019-08-30
Attending: EMERGENCY MEDICINE
Payer: MEDICARE

## 2019-08-30 VITALS
DIASTOLIC BLOOD PRESSURE: 56 MMHG | HEIGHT: 65 IN | BODY MASS INDEX: 24.99 KG/M2 | SYSTOLIC BLOOD PRESSURE: 135 MMHG | WEIGHT: 150 LBS | OXYGEN SATURATION: 99 % | RESPIRATION RATE: 18 BRPM | HEART RATE: 98 BPM | TEMPERATURE: 97.9 F

## 2019-08-30 DIAGNOSIS — I82.401 ACUTE DEEP VEIN THROMBOSIS (DVT) OF RIGHT LOWER EXTREMITY, UNSPECIFIED VEIN (HCC): ICD-10-CM

## 2019-08-30 DIAGNOSIS — M79.605 LEFT LEG PAIN: Primary | ICD-10-CM

## 2019-08-30 DIAGNOSIS — S82.141D CLOSED FRACTURE OF RIGHT TIBIAL PLATEAU WITH ROUTINE HEALING, SUBSEQUENT ENCOUNTER: ICD-10-CM

## 2019-08-30 LAB
A/G RATIO: 1.2 (ref 1.1–2.2)
ALBUMIN SERPL-MCNC: 3.4 G/DL (ref 3.4–5)
ALP BLD-CCNC: 100 U/L (ref 40–129)
ALT SERPL-CCNC: 16 U/L (ref 10–40)
ANION GAP SERPL CALCULATED.3IONS-SCNC: 12 MMOL/L (ref 3–16)
AST SERPL-CCNC: 36 U/L (ref 15–37)
BILIRUB SERPL-MCNC: 0.9 MG/DL (ref 0–1)
BUN BLDV-MCNC: 26 MG/DL (ref 7–20)
CALCIUM SERPL-MCNC: 9 MG/DL (ref 8.3–10.6)
CHLORIDE BLD-SCNC: 99 MMOL/L (ref 99–110)
CO2: 26 MMOL/L (ref 21–32)
CREAT SERPL-MCNC: 0.9 MG/DL (ref 0.6–1.2)
GFR AFRICAN AMERICAN: >60
GFR NON-AFRICAN AMERICAN: >60
GLOBULIN: 2.9 G/DL
GLUCOSE BLD-MCNC: 111 MG/DL (ref 70–99)
LACTIC ACID: 1.5 MMOL/L (ref 0.4–2)
POTASSIUM SERPL-SCNC: 4.4 MMOL/L (ref 3.5–5.1)
SODIUM BLD-SCNC: 137 MMOL/L (ref 136–145)
TOTAL PROTEIN: 6.3 G/DL (ref 6.4–8.2)

## 2019-08-30 PROCEDURE — 96376 TX/PRO/DX INJ SAME DRUG ADON: CPT

## 2019-08-30 PROCEDURE — 99284 EMERGENCY DEPT VISIT MOD MDM: CPT

## 2019-08-30 PROCEDURE — 83605 ASSAY OF LACTIC ACID: CPT

## 2019-08-30 PROCEDURE — 80053 COMPREHEN METABOLIC PANEL: CPT

## 2019-08-30 PROCEDURE — 87040 BLOOD CULTURE FOR BACTERIA: CPT

## 2019-08-30 PROCEDURE — 96374 THER/PROPH/DIAG INJ IV PUSH: CPT

## 2019-08-30 PROCEDURE — 96375 TX/PRO/DX INJ NEW DRUG ADDON: CPT

## 2019-08-30 PROCEDURE — 93971 EXTREMITY STUDY: CPT

## 2019-08-30 PROCEDURE — 6360000002 HC RX W HCPCS: Performed by: EMERGENCY MEDICINE

## 2019-08-30 RX ORDER — ONDANSETRON 2 MG/ML
4 INJECTION INTRAMUSCULAR; INTRAVENOUS EVERY 30 MIN PRN
Status: DISCONTINUED | OUTPATIENT
Start: 2019-08-30 | End: 2019-08-30 | Stop reason: HOSPADM

## 2019-08-30 RX ORDER — MORPHINE SULFATE 4 MG/ML
4 INJECTION, SOLUTION INTRAMUSCULAR; INTRAVENOUS EVERY 30 MIN PRN
Status: DISCONTINUED | OUTPATIENT
Start: 2019-08-30 | End: 2019-08-30 | Stop reason: HOSPADM

## 2019-08-30 RX ADMIN — HYDROMORPHONE HYDROCHLORIDE 1 MG: 1 INJECTION, SOLUTION INTRAMUSCULAR; INTRAVENOUS; SUBCUTANEOUS at 16:08

## 2019-08-30 RX ADMIN — MORPHINE SULFATE 4 MG: 4 INJECTION, SOLUTION INTRAMUSCULAR; INTRAVENOUS at 13:15

## 2019-08-30 RX ADMIN — ONDANSETRON 4 MG: 2 INJECTION INTRAMUSCULAR; INTRAVENOUS at 16:08

## 2019-08-30 RX ADMIN — ONDANSETRON 4 MG: 2 INJECTION INTRAMUSCULAR; INTRAVENOUS at 13:15

## 2019-08-30 ASSESSMENT — ENCOUNTER SYMPTOMS
ABDOMINAL PAIN: 0
SINUS PRESSURE: 0
TROUBLE SWALLOWING: 0
BACK PAIN: 0
BLOOD IN STOOL: 0
RHINORRHEA: 0
SHORTNESS OF BREATH: 0
EYE PAIN: 0
PHOTOPHOBIA: 0
VOMITING: 0
APNEA: 0
ABDOMINAL DISTENTION: 0
EYE DISCHARGE: 0
EYE REDNESS: 0
RECTAL PAIN: 0
CHEST TIGHTNESS: 0
WHEEZING: 0
COUGH: 0
CONSTIPATION: 0
COLOR CHANGE: 0
STRIDOR: 0
NAUSEA: 0
SORE THROAT: 0
DIARRHEA: 0
VOICE CHANGE: 0

## 2019-08-30 ASSESSMENT — PAIN SCALES - GENERAL
PAINLEVEL_OUTOF10: 9
PAINLEVEL_OUTOF10: 9
PAINLEVEL_OUTOF10: 8
PAINLEVEL_OUTOF10: 7
PAINLEVEL_OUTOF10: 9

## 2019-08-30 ASSESSMENT — PAIN DESCRIPTION - ORIENTATION
ORIENTATION: RIGHT
ORIENTATION: RIGHT

## 2019-08-30 ASSESSMENT — PAIN DESCRIPTION - LOCATION
LOCATION: LEG
LOCATION: LEG

## 2019-08-30 ASSESSMENT — PAIN DESCRIPTION - DESCRIPTORS: DESCRIPTORS: TINGLING;ACHING;SHARP

## 2019-08-30 ASSESSMENT — PAIN DESCRIPTION - FREQUENCY: FREQUENCY: CONTINUOUS

## 2019-08-30 NOTE — ED PROVIDER NOTES
Erica Wilks is a 79year old female who sustained a right tibial plateau fracture a few weeks ago. Dr. Chiquita Giron is managing this conservatively without surgery. She also has a history of nephrectomy for renal cell cancer, but has no history of DVT. Patient reports pain in the right posterior medical calf that she initially attributed to her long leg immobilizer, but is unable to get a position of comfort. She is taking Oxycodone without relief. She denies chest pain, SOB or syncope. Pain is currently 10/10. She denies fever. BP (!) 120/58   Pulse 93   Temp 97.9 °F (36.6 °C) (Oral)   Resp 16   Ht 5' 5\" (1.651 m)   Wt 150 lb (68 kg)   SpO2 99%   BMI 24.96 kg/m²     I have reviewed the following from the nursing documentation:      Prior to Admission medications    Medication Sig Start Date End Date Taking? Authorizing Provider   HYDROcodone-acetaminophen (NORCO) 5-325 MG per tablet Take 1 tablet by mouth every 6 hours as needed for Pain for up to 7 days.  8/26/19 9/2/19 Yes Alex Baig MD   atorvastatin (LIPITOR) 40 MG tablet TAKE 1 TABLET BY MOUTH ONE TIME A DAY 5/21/19  Yes Sherry Umana MD   lisinopril (PRINIVIL;ZESTRIL) 2.5 MG tablet TAKE ONE TABLET BY MOUTH DAILY 4/9/19  Yes Sherry Umnaa MD   metoprolol succinate (TOPROL XL) 25 MG extended release tablet Take 1 tablet by mouth daily 3/12/19  Yes Sherry Umana MD   nitroGLYCERIN (NITROSTAT) 0.4 MG SL tablet Place 1 tablet under the tongue every 5 minutes as needed for Chest pain 10/22/18  Yes Sherry Umana MD   Coenzyme Q10 (CO Q 10 PO) Take by mouth   Yes Historical Provider, MD   aspirin 81 MG chewable tablet Take 1 tablet by mouth daily 6/23/15  Yes INNA Hodges - CNP       Allergies as of 08/30/2019 - Review Complete 08/30/2019   Allergen Reaction Noted    Neomycin-bacitracin zn-polymyx Rash 12/12/2016    Neosporin [bacitracin-neomycin-polymyxin]  08/04/2012    Neomycin Rash 04/27/2009       Past Medical Alb 3.4 3.4 - 5.0 g/dL    Albumin/Globulin Ratio 1.2 1.1 - 2.2    Total Bilirubin 0.9 0.0 - 1.0 mg/dL    Alkaline Phosphatase 100 40 - 129 U/L    ALT 16 10 - 40 U/L    AST 36 15 - 37 U/L    Globulin 2.9 g/dL   Lactic Acid, Plasma   Result Value Ref Range    Lactic Acid 1.5 0.4 - 2.0 mmol/L       I estimate there is LOW risk for COMPARTMENT SYNDROME,  SEPTIC ARTHRITIS, TENDON OR NEUROVASCULAR INJURY, thus I consider the discharge disposition reasonable. Denice Lew and I have discussed the diagnosis and risks, and we agree with discharging home to follow-up with their primary doctor or the referral orthopedist. We also discussed returning to the Emergency Department immediately if new or worsening symptoms occur. We have discussed the symptoms which are most concerning (e.g., changing or worsening pain, numbness, weakness) that necessitate immediate return. Final Impression    1. Left leg pain    2. Acute deep vein thrombosis (DVT) of right lower extremity, unspecified vein (HCC)    3. Closed fracture of right tibial plateau with routine healing, subsequent encounter        Blood pressure 119/69, pulse 102, temperature 97.9 °F (36.6 °C), temperature source Oral, resp. rate 16, height 5' 5\" (1.651 m), weight 150 lb (68 kg), SpO2 100 %. Radiology  Xr Femur Right (min 2 Views)    Result Date: 8/24/2019  EXAMINATION: 4 XRAY VIEWS OF THE RIGHT FEMUR 8/24/2019 2:09 am COMPARISON: Right tibia/fibula radiograph and CT knee 08/23/2019 HISTORY: Reason for exam:->pain s/p fall yesterday Reason for Exam: tib fib fracture today, sent home with pain medication, Muscle spasms and unbcontrolled pain FINDINGS: No evidence of acute fracture of the right femur. No dislocation. Right hip joint space is relatively well preserved. Redemonstration of complex tibial plateau fracture as well as mildly displaced proximal fibular fracture with associated large joint effusion.      1. No acute osseous abnormality of the right femur. 2. Redemonstration of complex tibial plateau fracture as well as mildly displaced proximal fibular fracture with associated large joint effusion, please see recent CT for further detail. Xr Tibia Fibula Right (2 Views)    Result Date: 8/23/2019  EXAMINATION: 3 XRAY VIEWS OF THE RIGHT TIBIA AND FIBULA 8/23/2019 2:14 pm COMPARISON: None. HISTORY: ORDERING SYSTEM PROVIDED HISTORY: fall from trailer twisting leg TECHNOLOGIST PROVIDED HISTORY: Reason for exam:->fall from trailer twisting leg Reason for Exam: Patient fell off back of boat trailer and twisted leg - pain is mostly at upper fibula (lateral side) Acuity: Acute Type of Exam: Initial FINDINGS: There is a comminuted intra-articular fracture of the lateral tibial plateau which extends to the tibial spine. There is also a subjacent comminuted closed fracture of the head/neck of the right fibula. The ankle mortise is intact. The bones are osteopenic. Fracture of the medial tibial plateau is also likely present. Dedicated knee radiographs are recommended. Closed comminuted intra-articular fracture of the proximal right tibia with involvement of the lateral tibial plateau and possibly the medial tibial plateau. Dedicated right knee radiographs recommended. Closed comminuted fracture of the head/neck of the right fibula. Ct Knee Right Wo Contrast    Result Date: 8/23/2019  EXAMINATION: CT OF THE RIGHT KNEE WITHOUT CONTRAST 8/23/2019 4:14 pm TECHNIQUE: CT of the right knee was performed without the administration of intravenous contrast.  Multiplanar reformatted images are provided for review. Dose modulation, iterative reconstruction, and/or weight based adjustment of the mA/kV was utilized to reduce the radiation dose to as low as reasonably achievable. COMPARISON: Right tibia/fibula radiographs 08/23/2019.  HISTORY ORDERING SYSTEM PROVIDED HISTORY: fall, tib fib fx TECHNOLOGIST PROVIDED HISTORY: Reason for Exam: fell today, known fractures from plain films, no prev surgery Acuity: Acute Type of Exam: Initial FINDINGS: Bones: There is an acute mildly comminuted fracture involving the anterior and posterior aspects of the lateral tibial plateau with up to 4 mm depression of the subchondral bone plate in the posterior aspect. The fracture extends the tibial spine mildly comminuted fracture components involving the central and posterior aspects of medial tibial plateau with up to approximately 2 mm depression of the subchondral bone plate. The fracture involves the posterior aspect of the proximal tibial metaphysis. An acute comminuted mildly displaced fibular head and neck fracture is also seen. No other fracture identified. No dislocation. Soft Tissue: The extensor mechanism is intact. Periarticular soft tissue edema. No radiopaque foreign body or soft tissue gas. Joint:  Large lipohemarthrosis. No popliteal cyst.  Mild lateral compartment degenerative changes. No joint body. 1. Acute mildly comminuted and depressed fracture of the medial and lateral tibial plateau as detailed above. 2. Acute comminuted mildly displaced fibular head and neck fracture. 3. Large lipohemarthrosis. 4. Mild lateral compartment degenerative changes.      Vl Extremity Venous Right    Result Date: 8/30/2019  Vascular Lower Extremities DVT Study Procedure -- PRELIMINARY SONOGRAPHER REPORT --   Demographics   Patient Name       Darrion Merrill   Date of Study      08/30/2019         Gender              Female   Patient Number     [id number]         Date of Birth       1950   Visit Number       [id number]          Age                 79 year(s)   Accession Number   [id number]          Room Number         06   Corporate ID       [id number]            Sonographer         German Coelho, T   Ordering Physician Mimi Kirby., Interpreting        [facility]                     MD                 Physician           Readers  Procedure Type of Study:   [facility] Lu[facility] Ortiz Extremities DVT Study, VL EXTREMITY VENOUS DUPLEX RIGHT. Tech Comments Right 1. There is hypoechoic totally occluding material present in the lumen of the soleal vein. All other veins demonstrated complete compressibility. 2. There is normal spontaneous and phasic flow throughout the lower extremity with exception of soleal vein. Left 1. There is complete compressibility of the common femoral vein. 2. There is normal spontaneous and phasic flow in the common femoral vein. Admission on 08/30/2019   Component Date Value Ref Range Status    Sodium 08/30/2019 137  136 - 145 mmol/L Final    Potassium 08/30/2019 4.4  3.5 - 5.1 mmol/L Final    Chloride 08/30/2019 99  99 - 110 mmol/L Final    CO2 08/30/2019 26  21 - 32 mmol/L Final    Anion Gap 08/30/2019 12  3 - 16 Final    Glucose 08/30/2019 111* 70 - 99 mg/dL Final    BUN 08/30/2019 26* 7 - 20 mg/dL Final    CREATININE 08/30/2019 0.9  0.6 - 1.2 mg/dL Final    GFR Non- 08/30/2019 >60  >60 Final    GFR  08/30/2019 >60  >60 Final    Calcium 08/30/2019 9.0  8.3 - 10.6 mg/dL Final    Total Protein 08/30/2019 6.3* 6.4 - 8.2 g/dL Final    Alb 08/30/2019 3.4  3.4 - 5.0 g/dL Final    Albumin/Globulin Ratio 08/30/2019 1.2  1.1 - 2.2 Final    Total Bilirubin 08/30/2019 0.9  0.0 - 1.0 mg/dL Final    Alkaline Phosphatase 08/30/2019 100  40 - 129 U/L Final    ALT 08/30/2019 16  10 - 40 U/L Final    AST 08/30/2019 36  15 - 37 U/L Final    Globulin 08/30/2019 2.9  g/dL Final    Lactic Acid 08/30/2019 1.5  0.4 - 2.0 mmol/L Final       4:00pm Consultation with Dr. Lizabeth Coronado, on call for Dr. Estefany Hunter. He agrees with current outpatient management.       Elva Bird MD  08/30/19 5412 Yes

## 2019-09-04 LAB — BLOOD CULTURE, ROUTINE: NORMAL

## 2019-09-05 ENCOUNTER — OFFICE VISIT (OUTPATIENT)
Dept: ORTHOPEDIC SURGERY | Age: 69
End: 2019-09-05
Payer: MEDICARE

## 2019-09-05 VITALS — HEIGHT: 65 IN | WEIGHT: 150 LBS | BODY MASS INDEX: 24.99 KG/M2

## 2019-09-05 DIAGNOSIS — M79.671 RIGHT FOOT PAIN: ICD-10-CM

## 2019-09-05 DIAGNOSIS — S82.201D CLOSED FRACTURE OF RIGHT TIBIA AND FIBULA WITH ROUTINE HEALING, SUBSEQUENT ENCOUNTER: Primary | ICD-10-CM

## 2019-09-05 DIAGNOSIS — R52 PAIN: Primary | ICD-10-CM

## 2019-09-05 DIAGNOSIS — S82.401D CLOSED FRACTURE OF RIGHT TIBIA AND FIBULA WITH ROUTINE HEALING, SUBSEQUENT ENCOUNTER: ICD-10-CM

## 2019-09-05 DIAGNOSIS — S82.401D CLOSED FRACTURE OF RIGHT TIBIA AND FIBULA WITH ROUTINE HEALING, SUBSEQUENT ENCOUNTER: Primary | ICD-10-CM

## 2019-09-05 DIAGNOSIS — M25.671 STIFFNESS OF RIGHT ANKLE JOINT: ICD-10-CM

## 2019-09-05 DIAGNOSIS — S82.201D CLOSED FRACTURE OF RIGHT TIBIA AND FIBULA WITH ROUTINE HEALING, SUBSEQUENT ENCOUNTER: ICD-10-CM

## 2019-09-05 PROCEDURE — 1090F PRES/ABSN URINE INCON ASSESS: CPT | Performed by: PHYSICIAN ASSISTANT

## 2019-09-05 PROCEDURE — 1123F ACP DISCUSS/DSCN MKR DOCD: CPT | Performed by: PHYSICIAN ASSISTANT

## 2019-09-05 PROCEDURE — 1111F DSCHRG MED/CURRENT MED MERGE: CPT | Performed by: PHYSICIAN ASSISTANT

## 2019-09-05 PROCEDURE — G8420 CALC BMI NORM PARAMETERS: HCPCS | Performed by: PHYSICIAN ASSISTANT

## 2019-09-05 PROCEDURE — 99213 OFFICE O/P EST LOW 20 MIN: CPT | Performed by: PHYSICIAN ASSISTANT

## 2019-09-05 PROCEDURE — G8427 DOCREV CUR MEDS BY ELIG CLIN: HCPCS | Performed by: PHYSICIAN ASSISTANT

## 2019-09-05 PROCEDURE — G8598 ASA/ANTIPLAT THER USED: HCPCS | Performed by: PHYSICIAN ASSISTANT

## 2019-09-05 PROCEDURE — 3017F COLORECTAL CA SCREEN DOC REV: CPT | Performed by: PHYSICIAN ASSISTANT

## 2019-09-05 PROCEDURE — L1830 KO IMMOB CANVAS LONG PRE OTS: HCPCS | Performed by: PHYSICIAN ASSISTANT

## 2019-09-05 PROCEDURE — 1036F TOBACCO NON-USER: CPT | Performed by: PHYSICIAN ASSISTANT

## 2019-09-05 PROCEDURE — 4040F PNEUMOC VAC/ADMIN/RCVD: CPT | Performed by: PHYSICIAN ASSISTANT

## 2019-09-05 PROCEDURE — G8400 PT W/DXA NO RESULTS DOC: HCPCS | Performed by: PHYSICIAN ASSISTANT

## 2019-09-05 RX ORDER — HYDROCODONE BITARTRATE AND ACETAMINOPHEN 5; 325 MG/1; MG/1
1 TABLET ORAL EVERY 6 HOURS PRN
Qty: 28 TABLET | Refills: 0 | Status: SHIPPED | OUTPATIENT
Start: 2019-09-05 | End: 2019-09-12 | Stop reason: ALTCHOICE

## 2019-09-12 ENCOUNTER — OFFICE VISIT (OUTPATIENT)
Dept: ORTHOPEDIC SURGERY | Age: 69
End: 2019-09-12
Payer: MEDICARE

## 2019-09-12 ENCOUNTER — TELEPHONE (OUTPATIENT)
Dept: ORTHOPEDIC SURGERY | Age: 69
End: 2019-09-12

## 2019-09-12 VITALS — HEIGHT: 65 IN | WEIGHT: 149.91 LBS | BODY MASS INDEX: 24.98 KG/M2

## 2019-09-12 DIAGNOSIS — S82.201D CLOSED FRACTURE OF RIGHT TIBIA AND FIBULA WITH ROUTINE HEALING, SUBSEQUENT ENCOUNTER: ICD-10-CM

## 2019-09-12 DIAGNOSIS — M25.561 RIGHT KNEE PAIN, UNSPECIFIED CHRONICITY: Primary | ICD-10-CM

## 2019-09-12 DIAGNOSIS — S82.401D CLOSED FRACTURE OF RIGHT TIBIA AND FIBULA WITH ROUTINE HEALING, SUBSEQUENT ENCOUNTER: ICD-10-CM

## 2019-09-12 PROCEDURE — G8427 DOCREV CUR MEDS BY ELIG CLIN: HCPCS | Performed by: ORTHOPAEDIC SURGERY

## 2019-09-12 PROCEDURE — 4040F PNEUMOC VAC/ADMIN/RCVD: CPT | Performed by: ORTHOPAEDIC SURGERY

## 2019-09-12 PROCEDURE — G8400 PT W/DXA NO RESULTS DOC: HCPCS | Performed by: ORTHOPAEDIC SURGERY

## 2019-09-12 PROCEDURE — L1832 KO ADJ JNT POS R SUP PRE CST: HCPCS | Performed by: ORTHOPAEDIC SURGERY

## 2019-09-12 PROCEDURE — G8598 ASA/ANTIPLAT THER USED: HCPCS | Performed by: ORTHOPAEDIC SURGERY

## 2019-09-12 PROCEDURE — 1090F PRES/ABSN URINE INCON ASSESS: CPT | Performed by: ORTHOPAEDIC SURGERY

## 2019-09-12 PROCEDURE — 1123F ACP DISCUSS/DSCN MKR DOCD: CPT | Performed by: ORTHOPAEDIC SURGERY

## 2019-09-12 PROCEDURE — 1036F TOBACCO NON-USER: CPT | Performed by: ORTHOPAEDIC SURGERY

## 2019-09-12 PROCEDURE — 3017F COLORECTAL CA SCREEN DOC REV: CPT | Performed by: ORTHOPAEDIC SURGERY

## 2019-09-12 PROCEDURE — G8420 CALC BMI NORM PARAMETERS: HCPCS | Performed by: ORTHOPAEDIC SURGERY

## 2019-09-12 PROCEDURE — 99213 OFFICE O/P EST LOW 20 MIN: CPT | Performed by: ORTHOPAEDIC SURGERY

## 2019-09-12 PROCEDURE — 1111F DSCHRG MED/CURRENT MED MERGE: CPT | Performed by: ORTHOPAEDIC SURGERY

## 2019-09-12 RX ORDER — HYDROCODONE BITARTRATE AND ACETAMINOPHEN 5; 325 MG/1; MG/1
1 TABLET ORAL EVERY 6 HOURS PRN
Qty: 28 TABLET | Refills: 0 | Status: SHIPPED | OUTPATIENT
Start: 2019-09-12 | End: 2019-09-19

## 2019-09-12 RX ORDER — APIXABAN 5 MG/1
TABLET, FILM COATED ORAL
COMMUNITY
Start: 2019-09-09 | End: 2020-02-03 | Stop reason: SDUPTHER

## 2019-09-30 ENCOUNTER — TELEPHONE (OUTPATIENT)
Dept: CARDIOLOGY CLINIC | Age: 69
End: 2019-09-30

## 2019-10-07 ENCOUNTER — OFFICE VISIT (OUTPATIENT)
Dept: ORTHOPEDIC SURGERY | Age: 69
End: 2019-10-07
Payer: MEDICARE

## 2019-10-07 VITALS — WEIGHT: 149.91 LBS | BODY MASS INDEX: 24.98 KG/M2 | HEIGHT: 65 IN

## 2019-10-07 DIAGNOSIS — S82.401D CLOSED FRACTURE OF RIGHT TIBIA AND FIBULA WITH ROUTINE HEALING, SUBSEQUENT ENCOUNTER: ICD-10-CM

## 2019-10-07 DIAGNOSIS — S82.201D CLOSED FRACTURE OF RIGHT TIBIA AND FIBULA WITH ROUTINE HEALING, SUBSEQUENT ENCOUNTER: ICD-10-CM

## 2019-10-07 DIAGNOSIS — M25.561 RIGHT KNEE PAIN, UNSPECIFIED CHRONICITY: Primary | ICD-10-CM

## 2019-10-07 PROCEDURE — G8598 ASA/ANTIPLAT THER USED: HCPCS | Performed by: ORTHOPAEDIC SURGERY

## 2019-10-07 PROCEDURE — G8484 FLU IMMUNIZE NO ADMIN: HCPCS | Performed by: ORTHOPAEDIC SURGERY

## 2019-10-07 PROCEDURE — 99213 OFFICE O/P EST LOW 20 MIN: CPT | Performed by: ORTHOPAEDIC SURGERY

## 2019-10-07 PROCEDURE — 1090F PRES/ABSN URINE INCON ASSESS: CPT | Performed by: ORTHOPAEDIC SURGERY

## 2019-10-07 PROCEDURE — 4040F PNEUMOC VAC/ADMIN/RCVD: CPT | Performed by: ORTHOPAEDIC SURGERY

## 2019-10-07 PROCEDURE — G8420 CALC BMI NORM PARAMETERS: HCPCS | Performed by: ORTHOPAEDIC SURGERY

## 2019-10-07 PROCEDURE — G8427 DOCREV CUR MEDS BY ELIG CLIN: HCPCS | Performed by: ORTHOPAEDIC SURGERY

## 2019-10-07 PROCEDURE — 3017F COLORECTAL CA SCREEN DOC REV: CPT | Performed by: ORTHOPAEDIC SURGERY

## 2019-10-07 PROCEDURE — 1123F ACP DISCUSS/DSCN MKR DOCD: CPT | Performed by: ORTHOPAEDIC SURGERY

## 2019-10-07 PROCEDURE — 1036F TOBACCO NON-USER: CPT | Performed by: ORTHOPAEDIC SURGERY

## 2019-10-07 PROCEDURE — G8400 PT W/DXA NO RESULTS DOC: HCPCS | Performed by: ORTHOPAEDIC SURGERY

## 2019-10-22 ENCOUNTER — OFFICE VISIT (OUTPATIENT)
Dept: CARDIOLOGY CLINIC | Age: 69
End: 2019-10-22
Payer: MEDICARE

## 2019-10-22 VITALS
BODY MASS INDEX: 24.99 KG/M2 | WEIGHT: 150 LBS | DIASTOLIC BLOOD PRESSURE: 70 MMHG | OXYGEN SATURATION: 98 % | HEART RATE: 84 BPM | HEIGHT: 65 IN | SYSTOLIC BLOOD PRESSURE: 124 MMHG

## 2019-10-22 DIAGNOSIS — E78.49 OTHER HYPERLIPIDEMIA: ICD-10-CM

## 2019-10-22 DIAGNOSIS — I25.10 CORONARY ARTERY DISEASE INVOLVING NATIVE CORONARY ARTERY OF NATIVE HEART WITHOUT ANGINA PECTORIS: Primary | ICD-10-CM

## 2019-10-22 DIAGNOSIS — I10 ESSENTIAL HYPERTENSION: ICD-10-CM

## 2019-10-22 PROCEDURE — 1123F ACP DISCUSS/DSCN MKR DOCD: CPT | Performed by: INTERNAL MEDICINE

## 2019-10-22 PROCEDURE — 99214 OFFICE O/P EST MOD 30 MIN: CPT | Performed by: INTERNAL MEDICINE

## 2019-10-22 PROCEDURE — 3017F COLORECTAL CA SCREEN DOC REV: CPT | Performed by: INTERNAL MEDICINE

## 2019-10-22 PROCEDURE — G8598 ASA/ANTIPLAT THER USED: HCPCS | Performed by: INTERNAL MEDICINE

## 2019-10-22 PROCEDURE — 1036F TOBACCO NON-USER: CPT | Performed by: INTERNAL MEDICINE

## 2019-10-22 PROCEDURE — G8420 CALC BMI NORM PARAMETERS: HCPCS | Performed by: INTERNAL MEDICINE

## 2019-10-22 PROCEDURE — G8400 PT W/DXA NO RESULTS DOC: HCPCS | Performed by: INTERNAL MEDICINE

## 2019-10-22 PROCEDURE — 4040F PNEUMOC VAC/ADMIN/RCVD: CPT | Performed by: INTERNAL MEDICINE

## 2019-10-22 PROCEDURE — 1090F PRES/ABSN URINE INCON ASSESS: CPT | Performed by: INTERNAL MEDICINE

## 2019-10-22 PROCEDURE — G8427 DOCREV CUR MEDS BY ELIG CLIN: HCPCS | Performed by: INTERNAL MEDICINE

## 2019-10-22 PROCEDURE — G8484 FLU IMMUNIZE NO ADMIN: HCPCS | Performed by: INTERNAL MEDICINE

## 2019-11-18 ENCOUNTER — OFFICE VISIT (OUTPATIENT)
Dept: ORTHOPEDIC SURGERY | Age: 69
End: 2019-11-18
Payer: MEDICARE

## 2019-11-18 VITALS — HEIGHT: 65 IN | BODY MASS INDEX: 24.98 KG/M2 | WEIGHT: 149.91 LBS

## 2019-11-18 DIAGNOSIS — M89.8X6 PAIN OF RIGHT TIBIA: Primary | ICD-10-CM

## 2019-11-18 DIAGNOSIS — S82.201D CLOSED FRACTURE OF RIGHT TIBIA AND FIBULA WITH ROUTINE HEALING, SUBSEQUENT ENCOUNTER: ICD-10-CM

## 2019-11-18 DIAGNOSIS — S82.401D CLOSED FRACTURE OF RIGHT TIBIA AND FIBULA WITH ROUTINE HEALING, SUBSEQUENT ENCOUNTER: ICD-10-CM

## 2019-11-18 PROCEDURE — G8427 DOCREV CUR MEDS BY ELIG CLIN: HCPCS | Performed by: ORTHOPAEDIC SURGERY

## 2019-11-18 PROCEDURE — 3017F COLORECTAL CA SCREEN DOC REV: CPT | Performed by: ORTHOPAEDIC SURGERY

## 2019-11-18 PROCEDURE — G8400 PT W/DXA NO RESULTS DOC: HCPCS | Performed by: ORTHOPAEDIC SURGERY

## 2019-11-18 PROCEDURE — 1036F TOBACCO NON-USER: CPT | Performed by: ORTHOPAEDIC SURGERY

## 2019-11-18 PROCEDURE — 1123F ACP DISCUSS/DSCN MKR DOCD: CPT | Performed by: ORTHOPAEDIC SURGERY

## 2019-11-18 PROCEDURE — 99213 OFFICE O/P EST LOW 20 MIN: CPT | Performed by: ORTHOPAEDIC SURGERY

## 2019-11-18 PROCEDURE — G8598 ASA/ANTIPLAT THER USED: HCPCS | Performed by: ORTHOPAEDIC SURGERY

## 2019-11-18 PROCEDURE — 4040F PNEUMOC VAC/ADMIN/RCVD: CPT | Performed by: ORTHOPAEDIC SURGERY

## 2019-11-18 PROCEDURE — 1090F PRES/ABSN URINE INCON ASSESS: CPT | Performed by: ORTHOPAEDIC SURGERY

## 2019-11-18 PROCEDURE — G8420 CALC BMI NORM PARAMETERS: HCPCS | Performed by: ORTHOPAEDIC SURGERY

## 2019-11-18 PROCEDURE — G8484 FLU IMMUNIZE NO ADMIN: HCPCS | Performed by: ORTHOPAEDIC SURGERY

## 2019-12-06 DIAGNOSIS — S82.201D CLOSED FRACTURE OF RIGHT TIBIA AND FIBULA WITH ROUTINE HEALING, SUBSEQUENT ENCOUNTER: Primary | ICD-10-CM

## 2019-12-06 DIAGNOSIS — S82.401D CLOSED FRACTURE OF RIGHT TIBIA AND FIBULA WITH ROUTINE HEALING, SUBSEQUENT ENCOUNTER: Primary | ICD-10-CM

## 2019-12-12 ENCOUNTER — HOSPITAL ENCOUNTER (OUTPATIENT)
Dept: PHYSICAL THERAPY | Age: 69
Setting detail: THERAPIES SERIES
Discharge: HOME | End: 2019-12-12
Payer: MEDICARE

## 2019-12-12 PROCEDURE — 97112 NEUROMUSCULAR REEDUCATION: CPT | Performed by: PHYSICAL THERAPIST

## 2019-12-12 PROCEDURE — 97162 PT EVAL MOD COMPLEX 30 MIN: CPT | Performed by: PHYSICAL THERAPIST

## 2019-12-12 PROCEDURE — 97116 GAIT TRAINING THERAPY: CPT | Performed by: PHYSICAL THERAPIST

## 2019-12-12 PROCEDURE — 97110 THERAPEUTIC EXERCISES: CPT | Performed by: PHYSICAL THERAPIST

## 2019-12-17 ENCOUNTER — HOSPITAL ENCOUNTER (OUTPATIENT)
Dept: PHYSICAL THERAPY | Age: 69
Setting detail: THERAPIES SERIES
Discharge: HOME | End: 2019-12-17
Payer: MEDICARE

## 2019-12-17 PROCEDURE — 97110 THERAPEUTIC EXERCISES: CPT | Performed by: PHYSICAL THERAPIST

## 2019-12-17 PROCEDURE — 97112 NEUROMUSCULAR REEDUCATION: CPT | Performed by: PHYSICAL THERAPIST

## 2019-12-17 PROCEDURE — 97140 MANUAL THERAPY 1/> REGIONS: CPT | Performed by: PHYSICAL THERAPIST

## 2019-12-19 ENCOUNTER — HOSPITAL ENCOUNTER (OUTPATIENT)
Dept: PHYSICAL THERAPY | Age: 69
Setting detail: THERAPIES SERIES
Discharge: HOME | End: 2019-12-19
Payer: MEDICARE

## 2019-12-19 PROCEDURE — 97112 NEUROMUSCULAR REEDUCATION: CPT | Performed by: PHYSICAL THERAPIST

## 2019-12-19 PROCEDURE — 97110 THERAPEUTIC EXERCISES: CPT | Performed by: PHYSICAL THERAPIST

## 2019-12-24 ENCOUNTER — HOSPITAL ENCOUNTER (OUTPATIENT)
Dept: PHYSICAL THERAPY | Age: 69
Setting detail: THERAPIES SERIES
Discharge: HOME | End: 2019-12-24
Payer: MEDICARE

## 2019-12-24 PROCEDURE — 97110 THERAPEUTIC EXERCISES: CPT | Performed by: PHYSICAL THERAPIST

## 2019-12-24 PROCEDURE — 97116 GAIT TRAINING THERAPY: CPT | Performed by: PHYSICAL THERAPIST

## 2019-12-24 PROCEDURE — 97112 NEUROMUSCULAR REEDUCATION: CPT | Performed by: PHYSICAL THERAPIST

## 2019-12-31 ENCOUNTER — HOSPITAL ENCOUNTER (OUTPATIENT)
Dept: PHYSICAL THERAPY | Age: 69
Setting detail: THERAPIES SERIES
Discharge: HOME | End: 2019-12-31
Payer: MEDICARE

## 2019-12-31 PROCEDURE — 97110 THERAPEUTIC EXERCISES: CPT | Performed by: PHYSICAL THERAPIST

## 2019-12-31 PROCEDURE — 97530 THERAPEUTIC ACTIVITIES: CPT | Performed by: PHYSICAL THERAPIST

## 2019-12-31 PROCEDURE — 97112 NEUROMUSCULAR REEDUCATION: CPT | Performed by: PHYSICAL THERAPIST

## 2019-12-31 NOTE — FLOWSHEET NOTE
[facility], [address]  Phone: [phone]  Fax [phone]     Physical Therapy Treatment Note/ Progress Report:       Date:  2019    Patient Name:  Reyna Wong    :  1950  MRN: [id number]  Restrictions/Precautions:    Medical/Treatment Diagnosis Information:  · Diagnosis: S82.201D, S82.401D (ICD-10-CM) - Closed fracture of right tibia and fibula with routine healing, subsequent encounter  Treatment Diagnosis: dec ROM, dec strength, gt deviations and pain  Insurance/Certification information:  PT Insurance Information: Medicare  Physician Information:  Referring Practitioner: Dr. Sheri Haas  Has the plan of care been signed (Y/N):        []  Yes  [x]  No 19    Date of Patient follow up with Physician: 20    Is this a Progress Report:     []  Yes  [x]  No     If Yes:  Date Range for reporting period:  Beginning 19  Ending 20    Progress report will be due (10 Rx or 30 days whichever is less): 3/62/03     Recertification will be due (POC Duration  / 90 days whichever is less): 3/12/20       Visit # Insurance Allowable Auth Required   [address] []  Yes [x]  No        Functional Scale: 53.5%  Date assessed: 19      Latex Allergy:  [x]NO      []YES  Preferred Language for Healthcare:   [x]English       []other:    Pain level:  0-5/10     SUBJECTIVE:  Pt purchased an ankle brace to assist w/ walking.  Her family is helping to make her aware of when she is limping on R LE.    OBJECTIVE: See eval   Observation: amb w/ cane and min dec in TKE and shift to the R;   Test measurements:    OBJECTIVE:   Flexibility L R Comment   Hamstring   Mod tightness 19   Gastroc   Mod tightness     ITB         Quad                                   ROM PROM AROM Overpressure Comment     L R L R L R 19   Flexion     138 130         Extension     0 0                                         activities related to improving balance, coordination, kinesthetic sense, posture, motor skill, proprioception and motor activation to allow for proper function of core, proximal hip and LE with self care and ADLs  [] (02633) Gait Re-education- Provided training and instruction to the patient for proper LE, core and proximal hip recruitment and positioning and eccentric body weight control with ambulation re-education including up and down stairs     Home Exercise Program:    [x] (81034) Reviewed/Progressed HEP activities related to strengthening, flexibility, endurance, ROM of core, proximal hip and LE for functional self-care, mobility, lifting and ambulation/stair navigation        12/24/19   reviewed gt pattern  [] (88768)Reviewed/Progressed HEP activities related to improving balance, coordination, kinesthetic sense, posture, motor skill, proprioception of core, proximal hip and LE for self care, mobility, lifting, and ambulation/stair navigation      Manual Treatments:  PROM / STM / Oscillations-Mobs:  G-I, II, III, IV (PA's, Inf., Post.)  [] (72561) Provided manual therapy to mobilize LE, proximal hip and/or LS spine soft tissue/joints for the purpose of modulating pain, promoting relaxation,  increasing ROM, reducing/eliminating soft tissue swelling/inflammation/restriction, improving soft tissue extensibility and allowing for proper ROM for normal function with self care, mobility, lifting and ambulation. Modalities:  CP x10 min    [] GAME READY (VASO)- for significant edema, swelling, pain control.      Charges:  Timed Code Treatment Minutes: 60   Total Treatment Minutes: 70     [] EVAL (LOW) 63819 (typically 20 minutes face-to-face)  [] EVAL (MOD) 31882 (typically 30 minutes face-to-face)  [] EVAL (HIGH) 85118 (typically 45 minutes face-to-face)  [] RE-EVAL   [x] DY(87971) x  2   [] IONTO  [x] NMR (68855) x 1    [] VASO  [] Manual (87671) x      [x] Other: GT   [x] TA (61704) x 1     [] Mech Traction (59433)  [] ES(attended) (34567)      [] ES (un) (27275):     GOALS:  Patient stated goal: walking dog  [] Progressing: [] Met: [] Not Met: [] Adjusted    Therapist goals for Patient:   Short Term Goals: To be achieved in: 2 weeks  1. Independent in HEP and progression per patient tolerance, in order to prevent re-injury. [] Progressing: [] Met: [] Not Met: [] Adjusted   2. Patient will have a decrease in pain to facilitate improvement in movement, function, and ADLs as indicated by Functional Deficits. [] Progressing: [] Met: [] Not Met: [] Adjusted     Long Term Goals: To be achieved in: 12 weeks  1. Disability index score of 20%% or less for the LEFS to assist with reaching prior level of function. [] Progressing: [] Met: [] Not Met: [] Adjusted   2. Patient will demonstrate increased AROM to 0-135 to allow for proper joint functioning as indicated by patients Functional Deficits. [] Progressing: [] Met: [] Not Met: [] Adjusted   3. Patient will demonstrate an increase in Strength to good proximal hip strength and control, within 5lb HHD in LE to allow for proper functional mobility as indicated by patients Functional Deficits. [] Progressing: [] Met: [] Not Met: [] Adjusted   4. Patient will return to 90% functional activities without increased symptoms or restriction. [] Progressing: [] Met: [] Not Met: [] Adjusted   5. Pt will be able to walk dog w/o deviations (patient specific functional goal)    [] Progressing: [] Met: [] Not Met: [] Adjusted       Overall Progression Towards Functional goals/ Treatment Progress Update:  [] Patient is progressing as expected towards functional goals listed. [] Progression is slowed due to complexities/Impairments listed. [] Progression has been slowed due to co-morbidities.   [x] Plan just implemented, too soon to assess goals progression <30days   [] Goals require adjustment due to lack of progress  [] Patient is not progressing as expected and requires additional follow up with physician  [] Other    ASSESSMENT:  Pt's walking tech improved w/ inc kristyn but limp noted int. Pt progressed to step down w/ min challenge physically but needed mod concentrate for correct tech. Strength improving in R LE but pt still aware of significant difference between LE's. Edema in R LE unchanged w/ compression garment donned. Ankle brace tried for foot pain but pt will discuss concerns and persistent pain in foot w/ MD on 1/13/20. Treatment/Activity Tolerance:  [x] Patient tolerated treatment well [] Patient limited by fatique  [] Patient limited by pain  [] Patient limited by other medical complications  [] Other:     Prognosis: [x] Good [] Fair  [] Poor    Patient Requires Follow-up: [x] Yes  [] No    PLAN: Consider leg press next session. Transfer care of pt to [facility], PT. Cont therapy for gt progression and functional activities. [x] Continue per plan of care [] Alter current plan (see comments)  [x] Plan of care initiated [] Hold pending MD visit [] Discharge    Electronically signed by: Breonna Chiu PT, MS, OMT-C    Physical Therapist [place] license #[id number]  Physical Therapist [place] license #[id number]    Note: If patient does not return for scheduled/ recommended follow up visits, this note will serve as a discharge from care along with most recent update on progress.

## 2020-01-07 ENCOUNTER — HOSPITAL ENCOUNTER (OUTPATIENT)
Dept: PHYSICAL THERAPY | Age: 70
Setting detail: THERAPIES SERIES
Discharge: HOME | End: 2020-01-07
Payer: MEDICARE

## 2020-01-07 PROCEDURE — 97140 MANUAL THERAPY 1/> REGIONS: CPT | Performed by: PHYSICAL THERAPIST

## 2020-01-07 PROCEDURE — 97110 THERAPEUTIC EXERCISES: CPT | Performed by: PHYSICAL THERAPIST

## 2020-01-07 NOTE — FLOWSHEET NOTE
Quad                                   ROM PROM AROM Overpressure Comment     L R L R L R 12/31/19   Flexion     138 130         Extension     0 0                                                   Strength L R Comment   Quad good Fair + 12/12/19   Hamstring         Gastroc                                  RESTRICTIONS/PRECAUTIONS: WBAT on R LE; DVT x3 month; tibia plateau fracture on 5/48/30    Exercises/Interventions:   Exercise/Equipment Resistance/Repetitions Other comments   Cardio/Warm-up     Bike 5'    Treadmill          Stretching     Hamstring B Seated 10\"x10    Hip Flexion     ITB     Grion     Quad     Inclined Calf B 30\"x3    Towel Pull          ROM     Passive     Active     Weight Shift     Weight Hangs     Sheet Pulls     Ankle Pumps           Patellar Glides     Medial x    Superior x    Inferior x         STRENGTH     SLR 2x10 B    Supine     Prone     Bridging +PS     Bridging single leg 2x10    Abduction     clams x30 OTB in side lying   Adducton     SAQ 10\"x10    SLR+          Isometrics     Quad sets          CKC     Calf raises Heel/toes x30    Wall sits 30\"x3 w/ SB     Step up x15 6 in   Step ups lat x15 6 in   Step down x15 6 in   1 leg stand     Squatting     CC TKE     Balance      SLS 30\"x3 eo, ec    Monster walks OTB x long wall x2              PRE     Extension 5\" x20 3# RANGE:   Flexion  RANGE:   Leg Press  RANGE:   Ed  Reviewed HEP    GT  w/o SPC, trunk rotation, symmetry w/ stride length and TKE when WBing on R LE;               Manual/Modalities                 Therapeutic Exercise and NMR EXR  [x] (12097) Provided verbal/tactile cueing for activities related to strengthening, flexibility, endurance, ROM for improvements in LE, proximal hip, and core control with self care, mobility, lifting, ambulation.   [x] (51364) Provided verbal/tactile cueing for activities related to improving balance, coordination, kinesthetic sense, posture, motor skill, proprioception  to assist with LE, proximal hip, and core control in self care, mobility, lifting, ambulation and eccentric single leg control. NMR and Therapeutic Activities:    [] (65979 or 42701) Provided verbal/tactile cueing for activities related to improving balance, coordination, kinesthetic sense, posture, motor skill, proprioception and motor activation to allow for proper function of core, proximal hip and LE with self care and ADLs  [] (69579) Gait Re-education- Provided training and instruction to the patient for proper LE, core and proximal hip recruitment and positioning and eccentric body weight control with ambulation re-education including up and down stairs     Home Exercise Program:    [x] (77523) Reviewed/Progressed HEP activities related to strengthening, flexibility, endurance, ROM of core, proximal hip and LE for functional self-care, mobility, lifting and ambulation/stair navigation        12/24/19   reviewed gt pattern  [] (63214)Reviewed/Progressed HEP activities related to improving balance, coordination, kinesthetic sense, posture, motor skill, proprioception of core, proximal hip and LE for self care, mobility, lifting, and ambulation/stair navigation      Manual Treatments:  PROM / STM / Oscillations-Mobs:  G-I, II, III, IV (PA's, Inf., Post.)  [] (77019) Provided manual therapy to mobilize LE, proximal hip and/or LS spine soft tissue/joints for the purpose of modulating pain, promoting relaxation,  increasing ROM, reducing/eliminating soft tissue swelling/inflammation/restriction, improving soft tissue extensibility and allowing for proper ROM for normal function with self care, mobility, lifting and ambulation. Modalities:  CP x10 min    [] GAME READY (VASO)- for significant edema, swelling, pain control.      Charges:  Timed Code Treatment Minutes: 60   Total Treatment Minutes: 70     [] EVAL (LOW) 61087 (typically 20 minutes face-to-face)  [] EVAL (MOD) 13873 (typically 30 minutes face-to-face)  [] EVAL (HIGH) 91266 (typically 45 minutes face-to-face)  [] RE-EVAL   [x] HF(33925) x  3   [] IONTO  [x] NMR (75842) x 1    [] VASO  [] Manual (71347) x      [] Other: GT   [] TA (51586) x 1     [] Mech Traction (19400)  [] ES(attended) (78299)      [] ES (un) (27931):     GOALS:  Patient stated goal: walking dog  [] Progressing: [] Met: [] Not Met: [] Adjusted    Therapist goals for Patient:   Short Term Goals: To be achieved in: 2 weeks  1. Independent in HEP and progression per patient tolerance, in order to prevent re-injury. [] Progressing: [] Met: [] Not Met: [] Adjusted   2. Patient will have a decrease in pain to facilitate improvement in movement, function, and ADLs as indicated by Functional Deficits. [] Progressing: [] Met: [] Not Met: [] Adjusted     Long Term Goals: To be achieved in: 12 weeks  1. Disability index score of 20%% or less for the LEFS to assist with reaching prior level of function. [] Progressing: [] Met: [] Not Met: [] Adjusted   2. Patient will demonstrate increased AROM to 0-135 to allow for proper joint functioning as indicated by patients Functional Deficits. [] Progressing: [] Met: [] Not Met: [] Adjusted   3. Patient will demonstrate an increase in Strength to good proximal hip strength and control, within 5lb HHD in LE to allow for proper functional mobility as indicated by patients Functional Deficits. [] Progressing: [] Met: [] Not Met: [] Adjusted   4. Patient will return to 90% functional activities without increased symptoms or restriction. [] Progressing: [] Met: [] Not Met: [] Adjusted   5. Pt will be able to walk dog w/o deviations (patient specific functional goal)    [] Progressing: [] Met: [] Not Met: [] Adjusted       Overall Progression Towards Functional goals/ Treatment Progress Update:  [] Patient is progressing as expected towards functional goals listed. [] Progression is slowed due to complexities/Impairments listed.   [] Progression has been slowed due to

## 2020-01-09 ENCOUNTER — HOSPITAL ENCOUNTER (OUTPATIENT)
Dept: PHYSICAL THERAPY | Age: 70
Setting detail: THERAPIES SERIES
Discharge: HOME | End: 2020-01-09
Payer: MEDICARE

## 2020-01-09 PROCEDURE — 97110 THERAPEUTIC EXERCISES: CPT | Performed by: PHYSICAL THERAPIST

## 2020-01-09 PROCEDURE — 97112 NEUROMUSCULAR REEDUCATION: CPT | Performed by: PHYSICAL THERAPIST

## 2020-01-09 NOTE — FLOWSHEET NOTE
measurements:    OBJECTIVE:   Flexibility L R Comment   Hamstring   Mod tightness 12/12/19   Gastroc   Mod tightness     ITB         Quad                                   ROM PROM AROM Overpressure Comment     L R L R L R 12/31/19   Flexion     138 130         Extension     0 0                                                   Strength L R Comment   Quad good Fair + 12/12/19   Hamstring         Gastroc                                  RESTRICTIONS/PRECAUTIONS: WBAT on R LE; DVT x3 month; tibia plateau fracture on 9/41/05    Exercises/Interventions:   Exercise/Equipment Resistance/Repetitions Other comments   Cardio/Warm-up     Bike 5'    Treadmill          Stretching     Hamstring B Seated 30\"x3    Hip Flexion     ITB     Grion     Quad     Inclined Calf B 30\"x3    Towel Pull          ROM     Passive     Active     Weight Shift     Weight Hangs     Sheet Pulls     Ankle Pumps           Patellar Glides     Medial x    Superior x    Inferior x         STRENGTH     SLR 2x10 B 2#   Supine     Prone     Bridging +PS 10\"x10    Bridging single leg 2x10    Abduction     Clams B x30 OTB in side lying   Adducton     SAQ 10\"x10    SLR+ 2x10     Standing H' flex, abd., K' flex 10x each  2#   Isometrics     Quad sets          CKC     Calf raises Heel/toes x30    Wall sits 30\"x3 w/ SB     Step up x15 6 in   Step ups lat x15 6 in   Step down x15 6 in   1 leg stand     Squatting/ wall slide 10\"x10 as tolerated    CC TKE     Balance Tandem 30\"x4 eo, ec, airex     SLS 30\"x3 eo, ec    Monster walks OTB x long wall x2              PRE     Extension 5\" x20 3# RANGE:   Flexion  RANGE:   Leg Press  RANGE:   Ed  Reviewed HEP    GT  w/o SPC, trunk rotation, symmetry w/ stride length and TKE when WBing on R LE;               Manual/Modalities          Reviewed all ex/HP       Therapeutic Exercise and NMR EXR  [x] (70819) Provided verbal/tactile cueing for activities related to strengthening, flexibility, endurance, ROM for improvements in (VASO)- for significant edema, swelling, pain control. Charges:  Timed Code Treatment Minutes: 45   Total Treatment Minutes: 60'     [] EVAL (LOW) 13704 (typically 20 minutes face-to-face)  [] EVAL (MOD) 08402 (typically 30 minutes face-to-face)  [] EVAL (HIGH) 37999 (typically 45 minutes face-to-face)  [] RE-EVAL   [x] GI(63867) x  2  [] IONTO  [x] NMR (32217) x 1    [] VASO  [] Manual (87614) x      [] Other: GT   [] TA (28314) x 1     [] Mech Traction (10409)  [] ES(attended) (51068)      [] ES (un) (80939):     GOALS:  Patient stated goal: walking dog  [] Progressing: [] Met: [] Not Met: [] Adjusted    Therapist goals for Patient:   Short Term Goals: To be achieved in: 2 weeks  1. Independent in HEP and progression per patient tolerance, in order to prevent re-injury. [] Progressing: [] Met: [] Not Met: [] Adjusted   2. Patient will have a decrease in pain to facilitate improvement in movement, function, and ADLs as indicated by Functional Deficits. [] Progressing: [] Met: [] Not Met: [] Adjusted     Long Term Goals: To be achieved in: 12 weeks  1. Disability index score of 20%% or less for the LEFS to assist with reaching prior level of function. [] Progressing: [] Met: [] Not Met: [] Adjusted   2. Patient will demonstrate increased AROM to 0-135 to allow for proper joint functioning as indicated by patients Functional Deficits. [] Progressing: [] Met: [] Not Met: [] Adjusted   3. Patient will demonstrate an increase in Strength to good proximal hip strength and control, within 5lb HHD in LE to allow for proper functional mobility as indicated by patients Functional Deficits. [] Progressing: [] Met: [] Not Met: [] Adjusted   4. Patient will return to 90% functional activities without increased symptoms or restriction. [] Progressing: [] Met: [] Not Met: [] Adjusted   5.  Pt will be able to walk dog w/o deviations (patient specific functional goal)    [] Progressing: [] Met: [] Not Met: [] Adjusted Overall Progression Towards Functional goals/ Treatment Progress Update:  [] Patient is progressing as expected towards functional goals listed. [] Progression is slowed due to complexities/Impairments listed. [] Progression has been slowed due to co-morbidities. [x] Plan just implemented, too soon to assess goals progression <30days   [] Goals require adjustment due to lack of progress  [] Patient is not progressing as expected and requires additional follow up with physician  [] Other    ASSESSMENT:  Pt's walking tech improved w/ inc kristyn but limp noted int. Pt progressed to step down w/ min challenge physically but needed mod concentrate for correct tech. Strength improving in R LE but pt still aware of significant difference between LE's. Edema in R LE unchanged w/ compression garment donned. Ankle brace tried for foot pain but pt will discuss concerns and persistent pain in foot w/ MD on 1/13/20. Treatment/Activity Tolerance:  [x] Patient tolerated treatment well [] Patient limited by fatique  [] Patient limited by pain  [] Patient limited by other medical complications  [] Other:     Prognosis: [x] Good [] Fair  [] Poor    Patient Requires Follow-up: [x] Yes  [] No    PLAN: Consider leg press next session. Transfer care of pt to Naubo, PT. Cont therapy for gt progression and functional activities. [x] Continue per plan of care [] Alter current plan (see comments)  [x] Plan of care initiated [] Hold pending MD visit [] Discharge    Electronically signed by: Delilah Hankins PT, MS, OMT-C    Physical Therapist [address] [address] license #[id number]  Physical Therapist [place] license #[id number]    Note: If patient does not return for scheduled/ recommended follow up visits, this note will serve as a discharge from care along with most recent update on progress.

## 2020-01-13 ENCOUNTER — OFFICE VISIT (OUTPATIENT)
Dept: ORTHOPEDIC SURGERY | Age: 70
End: 2020-01-13
Payer: MEDICARE

## 2020-01-13 VITALS — BODY MASS INDEX: 24.98 KG/M2 | WEIGHT: 149.91 LBS | HEIGHT: 65 IN

## 2020-01-13 PROCEDURE — 1036F TOBACCO NON-USER: CPT | Performed by: ORTHOPAEDIC SURGERY

## 2020-01-13 PROCEDURE — G8427 DOCREV CUR MEDS BY ELIG CLIN: HCPCS | Performed by: ORTHOPAEDIC SURGERY

## 2020-01-13 PROCEDURE — 4040F PNEUMOC VAC/ADMIN/RCVD: CPT | Performed by: ORTHOPAEDIC SURGERY

## 2020-01-13 PROCEDURE — G8484 FLU IMMUNIZE NO ADMIN: HCPCS | Performed by: ORTHOPAEDIC SURGERY

## 2020-01-13 PROCEDURE — G8400 PT W/DXA NO RESULTS DOC: HCPCS | Performed by: ORTHOPAEDIC SURGERY

## 2020-01-13 PROCEDURE — 1090F PRES/ABSN URINE INCON ASSESS: CPT | Performed by: ORTHOPAEDIC SURGERY

## 2020-01-13 PROCEDURE — G8420 CALC BMI NORM PARAMETERS: HCPCS | Performed by: ORTHOPAEDIC SURGERY

## 2020-01-13 PROCEDURE — 99213 OFFICE O/P EST LOW 20 MIN: CPT | Performed by: ORTHOPAEDIC SURGERY

## 2020-01-13 PROCEDURE — 1123F ACP DISCUSS/DSCN MKR DOCD: CPT | Performed by: ORTHOPAEDIC SURGERY

## 2020-01-13 PROCEDURE — 3017F COLORECTAL CA SCREEN DOC REV: CPT | Performed by: ORTHOPAEDIC SURGERY

## 2020-01-13 NOTE — PROGRESS NOTES
Chief Complaint  Follow-up (Right knee: right bicondylar tibial plateau fracture with DVT with nonsurgical treatment;consultation 19 (5 months out)  no pain doing well )      History of Present Illness:  Alhaji Llamas is a 81 y.o. y/o female who presents today for follow up of her right knee. She is now 5 months out from a right knee bicondylar tibial plateau fracture and subsequently had a DVT with nonsurgical treatment. Overall she is doing fairly well. She has noticed a slight valgus deformity, but is not having significant pain. She does use a cane with ambulation. She is doing physical therapy. She is on Eliquis.       Medical History  Past Medical History:   Diagnosis Date    CAD (coronary artery disease)     Cancer ([facility] [address]     kidney cancer    Chest pain 2015    + Troponins/ Abn Stress Test/ PTCA    Family history of early CAD     Hyperlipidemia     Hypertension     Knee pain     right       Past Surgical History:   Procedure Laterality Date    CARDIAC SURGERY       SECTION      CORONARY ANGIOPLASTY WITH STENT PLACEMENT  2015    EYE SURGERY      PTCA  2015    95% occlusion- pLAD, HERNANDO 2.5x 16    TOTAL NEPHRECTOMY Right 16       Social History     Socioeconomic History    Marital status:      Spouse name: Not on file    Number of children: 2    Years of education: Not on file    Highest education level: Not on file   Occupational History    Not on file   Social Needs    Financial resource strain: Not on file    Food insecurity:     Worry: Not on file     Inability: Not on file    Transportation needs:     Medical: Not on file     Non-medical: Not on file   Tobacco Use    Smoking status: Never Smoker    Smokeless tobacco: Never Used   Substance and Sexual Activity    Alcohol use: Yes     Comment: Occasionally    Drug use: No    Sexual activity: Yes     Partners: Male   Lifestyle    Physical activity:     Days per week: Not on file     Minutes per Examinations:  Left Lower Extremity: Examination of the left lower extremity does not show any tenderness, deformity or injury. Range of motion is unremarkable. There is no gross instability. There are no rashes, ulcerations or lesions. Strength and tone are normal.      Radiology:     X-rays obtained and reviewed in office:  AP and lateral 2 views of the right knee obtained 1/13/2020 demonstrate healing of the tibial plateau fracture proximal fibula fracture. No change in alignment with slight valgus. Degenerative changes to the joint with joint space mostly maintained      Assessment:  73-year-old female with a right bicondylar tibial plateau fracture with a DVT with nonsurgical treatment now nearly 5 months out    Office Procedures:  Orders Placed This Encounter   Procedures    XR KNEE RIGHT (1-2 VIEWS)     Standing Status:   Future     Number of Occurrences:   1     Standing Expiration Date:   1/13/2021       Plan:   -At this time she continues to progress well in terms of her symptoms. She may continue with weightbearing as tolerated in physical therapy  -We discussed that she could develop some posttraumatic arthritis and worsening valgus deformity which may require further treatment. As her symptoms worsen in the future she will return for further treatment  -Ice, activity modification, medications as needed  -If there are issues interim she will contact the office    Iraj Paz MD  [address] [employer] [facility] of [facility] ([facility])        Voice Recognition Dictation disclaimer: Please note that portions of this chart were generated using Dragon dictation software. Although every effort was made to ensure the accuracy of this automated transcription, some errors in transcription may have occurred.

## 2020-01-14 ENCOUNTER — HOSPITAL ENCOUNTER (OUTPATIENT)
Dept: PHYSICAL THERAPY | Age: 70
Setting detail: THERAPIES SERIES
Discharge: HOME | End: 2020-01-14
Payer: MEDICARE

## 2020-01-14 PROCEDURE — 97110 THERAPEUTIC EXERCISES: CPT | Performed by: PHYSICAL THERAPIST

## 2020-01-14 PROCEDURE — 97112 NEUROMUSCULAR REEDUCATION: CPT | Performed by: PHYSICAL THERAPIST

## 2020-01-14 NOTE — FLOWSHEET NOTE
[facility], [address] [place]  Phone: [phone]  Fax [phone]     Physical Therapy Treatment Note/ Progress Report:       Date:  2020    Patient Name:  Desirae Wang    :  1950  MRN: [id number]  Restrictions/Precautions:    Medical/Treatment Diagnosis Information:  · Diagnosis: S82.201D, S82.401D (ICD-10-CM) - Closed fracture of right tibia and fibula with routine healing, subsequent encounter  Treatment Diagnosis: dec ROM, dec strength, gt deviations and pain  Insurance/Certification information:  PT Insurance Information: Medicare  Physician Information:  Referring Practitioner: Dr. Tomy Herndon  Has the plan of care been signed (Y/N):        [x]  Yes  []  No 19    Date of Patient follow up with Physician: 20    Is this a Progress Report:     [x]  Yes  []  No     If Yes:  Date Range for reporting period:  Beginning 19  Ending 20    Progress report will be due (10 Rx or 30 days whichever is less):      Recertification will be due (POC Duration  / 90 days whichever is less): 3/12/20       Visit # Insurance Allowable Auth Required   [address] []  Yes [x]  No        Functional Scale: 53.5%  Date assessed: 19      Latex Allergy:  [x]NO      []YES  Preferred Language for Healthcare:   [x]English       []other:    Pain level:  0-5/10     SUBJECTIVE:  Pt reports good follow up with MD recently. She can tell that she is getting stronger and more aware of her knee mechanics. Still not ascending/descending stairs reciprocal pattern , but is able to walk more without AD now.     OBJECTIVE: See eval   Observation: amb w/ cane and min dec in TKE and shift to the R;   Test measurements:    OBJECTIVE:   Flexibility L R Comment   Hamstring   Mod tightness 19   Gastroc   Mod tightness     ITB         Quad                                   ROM PROM AROM Overpressure Comment     L R L adjustment due to lack of progress  [] Patient is not progressing as expected and requires additional follow up with physician  [] Other    ASSESSMENT:  Pt is making good progress towards goals as stated. Continued strength, michelle ecc and hip, and mild flexion deficits noted. Still cues with R knee valgus and form with squatting. Pt would benefit from ongoing skilled PT to address deficits. Treatment/Activity Tolerance:  [x] Patient tolerated treatment well [] Patient limited by fatique  [] Patient limited by pain  [] Patient limited by other medical complications  [] Other:     Prognosis: [x] Good [] Fair  [] Poor    Patient Requires Follow-up: [x] Yes  [] No    PLAN:    Cont therapy for gt progression and functional activities.   [x] Continue per plan of care [] Alter current plan (see comments)  [] Plan of care initiated [] Hold pending MD visit [] Discharge    Electronically signed by: David Bhandari PT, OMT-C

## 2020-01-16 ENCOUNTER — HOSPITAL ENCOUNTER (OUTPATIENT)
Dept: PHYSICAL THERAPY | Age: 70
Setting detail: THERAPIES SERIES
Discharge: HOME | End: 2020-01-16
Payer: MEDICARE

## 2020-01-16 PROCEDURE — 97112 NEUROMUSCULAR REEDUCATION: CPT | Performed by: PHYSICAL THERAPIST

## 2020-01-16 PROCEDURE — 97110 THERAPEUTIC EXERCISES: CPT | Performed by: PHYSICAL THERAPIST

## 2020-01-16 NOTE — FLOWSHEET NOTE
[facility], [address] [place]  Phone: [phone]  Fax [phone]     Physical Therapy Treatment Note/ Progress Report:       Date:  2020    Patient Name:  Mat Rogers    :  1950  MRN: [id number]  Restrictions/Precautions:    Medical/Treatment Diagnosis Information:  · Diagnosis: S82.201D, S82.401D (ICD-10-CM) - Closed fracture of right tibia and fibula with routine healing, subsequent encounter  Treatment Diagnosis: dec ROM, dec strength, gt deviations and pain  Insurance/Certification information:  PT Insurance Information: Medicare  Physician Information:  Referring Practitioner: Dr. Troy Haro  Has the plan of care been signed (Y/N):        [x]  Yes  []  No 19    Date of Patient follow up with Physician: 20    Is this a Progress Report:     [x]  Yes  []  No     If Yes:  Date Range for reporting period:  Beginning 19  Ending 20    Progress report will be due (10 Rx or 30 days whichever is less):      Recertification will be due (POC Duration  / 90 days whichever is less): 3/12/20       Visit # Insurance Allowable Auth Required   [facility] []  Yes [x]  No        Functional Scale: 53.5%  Date assessed: 19      Latex Allergy:  [x]NO      []YES  Preferred Language for Healthcare:   [x]English       []other:    Pain level:  0-5/10     SUBJECTIVE:  Pt reports she was litzy to walk her dog 1/2 mile today.   Headed to [facility] tomorrow for a .    OBJECTIVE:    Observation:   Test measurements:    OBJECTIVE:   Flexibility L R Comment   Hamstring   Mod tightness 19   Gastroc   Mod tightness     ITB         Quad                                   ROM PROM AROM Overpressure Comment     L R L R L R 20   Flexion     138 132         Extension     0 0                                                   Strength L R Comment   Quad  4+ 20   Hamstring    4+     Gastroc     activities related to improving balance, coordination, kinesthetic sense, posture, motor skill, proprioception and motor activation to allow for proper function of core, proximal hip and LE with self care and ADLs  [x] (72177) Gait Re-education- Provided training and instruction to the patient for proper LE, core and proximal hip recruitment and positioning and eccentric body weight control with ambulation re-education including up and down stairs     Home Exercise Program:    [x] (10234) Reviewed/Progressed HEP activities related to strengthening, flexibility, endurance, ROM of core, proximal hip and LE for functional self-care, mobility, lifting and ambulation/stair navigation         [] (41434)Reviewed/Progressed HEP activities related to improving balance, coordination, kinesthetic sense, posture, motor skill, proprioception of core, proximal hip and LE for self care, mobility, lifting, and ambulation/stair navigation      Manual Treatments:  PROM / STM / Oscillations-Mobs:  G-I, II, III, IV (PA's, Inf., Post.)  [] (73660) Provided manual therapy to mobilize LE, proximal hip and/or LS spine soft tissue/joints for the purpose of modulating pain, promoting relaxation,  increasing ROM, reducing/eliminating soft tissue swelling/inflammation/restriction, improving soft tissue extensibility and allowing for proper ROM for normal function with self care, mobility, lifting and ambulation. Modalities:  CP x10 min    [] GAME READY (VASO)- for significant edema, swelling, pain control.      Charges:  Timed Code Treatment Minutes: 45   Total Treatment Minutes: 60' (indep bike)     [] EVAL (LOW) 64594 (typically 20 minutes face-to-face)  [] EVAL (MOD) 44835 (typically 30 minutes face-to-face)  [] EVAL (HIGH) 48795 (typically 45 minutes face-to-face)  [] RE-EVAL   [x] UR(72600) x  2  [] IONTO  [x] NMR (76015) x 1    [] VASO  [] Manual (39107) x      [] Other: GT   [] TA (95575) x      [] [facility] Traction (22315)  [] follow up with physician  [] Other    ASSESSMENT:  Improving knee positional awareness with squatting activities. Continued challenge with balance on compliant surface and with dynamic activity. Treatment/Activity Tolerance:  [x] Patient tolerated treatment well [] Patient limited by fatique  [] Patient limited by pain  [] Patient limited by other medical complications  [] Other:     Prognosis: [x] Good [] Fair  [] Poor    Patient Requires Follow-up: [x] Yes  [] No    PLAN:    Cont therapy for gt progression and functional activities.   [x] Continue per plan of care [] Alter current plan (see comments)  [] Plan of care initiated [] Hold pending MD visit [] Discharge    Electronically signed by: Cole Swanson PT, OMT-C

## 2020-01-21 ENCOUNTER — HOSPITAL ENCOUNTER (OUTPATIENT)
Dept: PHYSICAL THERAPY | Age: 70
Setting detail: THERAPIES SERIES
Discharge: HOME | End: 2020-01-21
Payer: MEDICARE

## 2020-01-21 PROCEDURE — 97110 THERAPEUTIC EXERCISES: CPT | Performed by: PHYSICAL THERAPIST

## 2020-01-21 PROCEDURE — 97140 MANUAL THERAPY 1/> REGIONS: CPT | Performed by: PHYSICAL THERAPIST

## 2020-01-21 NOTE — FLOWSHEET NOTE
[facility], [address]  Phone: [phone]  Fax [phone]     Physical Therapy Treatment Note/ Progress Report:       Date:  2020    Patient Name:  Jonas Sepulveda    :  1950  MRN: [id number]  Restrictions/Precautions:    Medical/Treatment Diagnosis Information:  · Diagnosis: S82.201D, S82.401D (ICD-10-CM) - Closed fracture of right tibia and fibula with routine healing, subsequent encounter  Treatment Diagnosis: dec ROM, dec strength, gt deviations and pain  Insurance/Certification information:  PT Insurance Information: Medicare  Physician Information:  Referring Practitioner: Dr. Tristen Caceres  Has the plan of care been signed (Y/N):        [x]  Yes  []  No 19    Date of Patient follow up with Physician: 20    Is this a Progress Report:     [x]  Yes  []  No     If Yes:  Date Range for reporting period:  Beginning 19  Ending 20    Progress report will be due (10 Rx or 30 days whichever is less): 3/10/33     Recertification will be due (POC Duration  / 90 days whichever is less): 3/12/20       Visit # Insurance Allowable Auth Required   [address] []  Yes [x]  No        Functional Scale: 53.5%  Date assessed: 19      Latex Allergy:  [x]NO      []YES  Preferred Language for Healthcare:   [x]English       []other:    Pain level:  3/10     SUBJECTIVE:  Pt reports she was sore following last session. She admits do having done extra repetitions on several of the exercises last time and then did not take many breaks in the car while travelling the next day which took 8 hours due to weather.     OBJECTIVE:    Observation:   Test measurements:    OBJECTIVE:   Flexibility L R Comment   Hamstring   WNL 20   Gastroc   Mod tightness     ITB         Quad    mod tightness                               ROM PROM AROM Overpressure Comment     L R L R L R 20   Flexion     138 132

## 2020-01-23 ENCOUNTER — HOSPITAL ENCOUNTER (OUTPATIENT)
Dept: PHYSICAL THERAPY | Age: 70
Setting detail: THERAPIES SERIES
Discharge: HOME | End: 2020-01-23
Payer: MEDICARE

## 2020-01-23 PROCEDURE — 97140 MANUAL THERAPY 1/> REGIONS: CPT | Performed by: PHYSICAL THERAPIST

## 2020-01-23 PROCEDURE — 97110 THERAPEUTIC EXERCISES: CPT | Performed by: PHYSICAL THERAPIST

## 2020-01-23 NOTE — FLOWSHEET NOTE
0                                                   Strength L R Comment   Quad  4+ 1/14/20   Hamstring    4+     Gastroc          hip abd   4+                     RESTRICTIONS/PRECAUTIONS: WBAT on R LE; DVT x3 month; tibia plateau fracture on 7/51/34    Exercises/Interventions:   Exercise/Equipment Resistance/Repetitions Other comments   Cardio/Warm-up     Bike 5' Warm up   Treadmill          Stretching     Hamstring B Seated 30\"x3  man   Hip Flexion     ITB     Groin     Quad Supine mod jessa 3x30\"  man   Inclined Calf B 30\"x3 L2   Towel Pull          ROM     Passive     Active     Weight Shift     Weight Hangs     Sheet Pulls     Ankle Pumps           Patellar Glides     Medial x    Superior x    Inferior x    Knee flexion mobs gr 3-4, STM quad x6'    STRENGTH     SLR  2#   Supine     Prone     Bridging +PS     Bridging  2x10 + alt HR    Abduction     Clams B  GVL in side lying   Adducton     SAQ SLR+      Standing H' flex, abd., K' flex  2#   Isometrics     Quad sets          CKC     Calf raises    Wall sits    Step up and over  6 in, NV   Step ups lat 6 in   Step down 6 in   1 leg stand     Squatting/  GVL 3x10 Hands on plinthe   CC TKE     Balance      SLS air-ex 30\"x3 eo,     Monster walks,  GVL 2x25' ea R/L    Glide disc ext, post-lat GVL x20 ea R/L Hands on 1/2 wall        PRE     Extension  RANGE:   Flexion RANGE:   Leg Press RANGE:   Ed      GT      See ATC log X         Manual/Modalities          Reviewed all ex/HP       Therapeutic Exercise and NMR EXR  [x] (95803) Provided verbal/tactile cueing for activities related to strengthening, flexibility, endurance, ROM for improvements in LE, proximal hip, and core control with self care, mobility, lifting, ambulation.   [x] (21831) Provided verbal/tactile cueing for activities related to improving balance, coordination, kinesthetic sense, posture, motor skill, proprioception  to assist with LE, proximal hip, and core control in self care, mobility, lifting, ambulation and eccentric single leg control. NMR and Therapeutic Activities:    [] (46537 or 93418) Provided verbal/tactile cueing for activities related to improving balance, coordination, kinesthetic sense, posture, motor skill, proprioception and motor activation to allow for proper function of core, proximal hip and LE with self care and ADLs  [x] (64645) Gait Re-education- Provided training and instruction to the patient for proper LE, core and proximal hip recruitment and positioning and eccentric body weight control with ambulation re-education including up and down stairs     Home Exercise Program:    [x] (84713) Reviewed/Progressed HEP activities related to strengthening, flexibility, endurance, ROM of core, proximal hip and LE for functional self-care, mobility, lifting and ambulation/stair navigation         [] (90213)Reviewed/Progressed HEP activities related to improving balance, coordination, kinesthetic sense, posture, motor skill, proprioception of core, proximal hip and LE for self care, mobility, lifting, and ambulation/stair navigation      Manual Treatments:  PROM / STM / Oscillations-Mobs:  G-I, II, III, IV (PA's, Inf., Post.)  [] (92556) Provided manual therapy to mobilize LE, proximal hip and/or LS spine soft tissue/joints for the purpose of modulating pain, promoting relaxation,  increasing ROM, reducing/eliminating soft tissue swelling/inflammation/restriction, improving soft tissue extensibility and allowing for proper ROM for normal function with self care, mobility, lifting and ambulation. Modalities:  CP x10 min    [] GAME READY (VASO)- for significant edema, swelling, pain control.      Charges:  Timed Code Treatment Minutes: 40   Total Treatment Minutes: 55' (indep bike)     [] EVAL (LOW) 25719 (typically 20 minutes face-to-face)  [] EVAL (MOD) 70315 (typically 30 minutes face-to-face)  [] EVAL (HIGH) 92375 (typically 45 minutes face-to-face)  [] RE-EVAL   [x] RP(89970) x  2  [] <30days   [] Goals require adjustment due to lack of progress  [] Patient is not progressing as expected and requires additional follow up with physician  [] Other    ASSESSMENT:  Quad fatigue reported following session without reported knee pain. Cues for stance leg with addition of glide disc for hip and quad strengthening. Treatment/Activity Tolerance:  [x] Patient tolerated treatment well [] Patient limited by fatique  [] Patient limited by pain  [] Patient limited by other medical complications  [] Other:     Prognosis: [x] Good [] Fair  [] Poor    Patient Requires Follow-up: [x] Yes  [] No    PLAN:    Cont therapy for gt progression and functional activities.   [x] Continue per plan of care [] Alter current plan (see comments)  [] Plan of care initiated [] Hold pending MD visit [] Discharge    Electronically signed by: Clayton Burgos PT, OMT-C

## 2020-01-28 ENCOUNTER — HOSPITAL ENCOUNTER (OUTPATIENT)
Dept: PHYSICAL THERAPY | Age: 70
Setting detail: THERAPIES SERIES
Discharge: HOME | End: 2020-01-28
Payer: MEDICARE

## 2020-01-28 PROCEDURE — 97140 MANUAL THERAPY 1/> REGIONS: CPT | Performed by: PHYSICAL THERAPIST

## 2020-01-28 PROCEDURE — 97110 THERAPEUTIC EXERCISES: CPT | Performed by: PHYSICAL THERAPIST

## 2020-01-30 ENCOUNTER — HOSPITAL ENCOUNTER (OUTPATIENT)
Dept: PHYSICAL THERAPY | Age: 70
Setting detail: THERAPIES SERIES
Discharge: HOME | End: 2020-01-30
Payer: MEDICARE

## 2020-01-30 PROCEDURE — 97140 MANUAL THERAPY 1/> REGIONS: CPT | Performed by: PHYSICAL THERAPIST

## 2020-01-30 PROCEDURE — 97110 THERAPEUTIC EXERCISES: CPT | Performed by: PHYSICAL THERAPIST

## 2020-01-30 NOTE — FLOWSHEET NOTE
[facility], [address] [place]  Phone: [phone]  Fax [phone]     Physical Therapy Treatment Note/ Progress Report:       Date:  2020    Patient Name:  Tatiana Mosquera    :  1950  MRN: [id number]  Restrictions/Precautions:    Medical/Treatment Diagnosis Information:  · Diagnosis: S82.201D, S82.401D (ICD-10-CM) - Closed fracture of right tibia and fibula with routine healing, subsequent encounter  Treatment Diagnosis: dec ROM, dec strength, gt deviations and pain  Insurance/Certification information:  PT Insurance Information: Medicare  Physician Information:  Referring Practitioner: Dr. Luke Mares  Has the plan of care been signed (Y/N):        [x]  Yes  []  No 19    Date of Patient follow up with Physician: 20    Is this a Progress Report:     [x]  Yes  []  No     If Yes:  Date Range for reporting period:  Beginning 19  Ending 20    Progress report will be due (10 Rx or 30 days whichever is less): 51     Recertification will be due (POC Duration  / 90 days whichever is less): 3/12/20       Visit # Insurance Allowable Auth Required   15 [facility] []  Yes [x]  No        Functional Scale: 53.5%  Date assessed: 19      Latex Allergy:  [x]NO      []YES  Preferred Language for Healthcare:   [x]English       []other:    Pain level:  NT/10     SUBJECTIVE:  Pt reports new stretches have helped her foot and she also switched her inserts. She reports this has helped her walking as well as the pain.     OBJECTIVE:    Observation:   Test measurements:    OBJECTIVE:   Flexibility L R Comment   Hamstring   WNL 20   Gastroc   Mod tightness     ITB         Quad    mod tightness                               ROM PROM AROM Overpressure Comment     L R L R L R 20   Flexion     138 132         Extension     0 0                                                   Strength L R Comment   Quad proprioception  to assist with LE, proximal hip, and core control in self care, mobility, lifting, ambulation and eccentric single leg control. NMR and Therapeutic Activities:    [] (86837 or 20540) Provided verbal/tactile cueing for activities related to improving balance, coordination, kinesthetic sense, posture, motor skill, proprioception and motor activation to allow for proper function of core, proximal hip and LE with self care and ADLs  [x] (18159) Gait Re-education- Provided training and instruction to the patient for proper LE, core and proximal hip recruitment and positioning and eccentric body weight control with ambulation re-education including up and down stairs     Home Exercise Program:    [x] (67528) Reviewed/Progressed HEP activities related to strengthening, flexibility, endurance, ROM of core, proximal hip and LE for functional self-care, mobility, lifting and ambulation/stair navigation         [] (91573)Reviewed/Progressed HEP activities related to improving balance, coordination, kinesthetic sense, posture, motor skill, proprioception of core, proximal hip and LE for self care, mobility, lifting, and ambulation/stair navigation      Manual Treatments:  PROM / STM / Oscillations-Mobs:  G-I, II, III, IV (PA's, Inf., Post.)  [] (60886) Provided manual therapy to mobilize LE, proximal hip and/or LS spine soft tissue/joints for the purpose of modulating pain, promoting relaxation,  increasing ROM, reducing/eliminating soft tissue swelling/inflammation/restriction, improving soft tissue extensibility and allowing for proper ROM for normal function with self care, mobility, lifting and ambulation. Modalities:  CP x10 min    [] GAME READY (VASO)- for significant edema, swelling, pain control.      Charges:  Timed Code Treatment Minutes: 45   Total Treatment Minutes: 65' (indep bike/CP)     [] EVAL (LOW) 552 1011 (typically 20 minutes face-to-face)  [] EVAL (MOD) 77045 (typically 30 minutes face-to-face)  [] EVAL (HIGH) 29720 (typically 45 minutes face-to-face)  [] RE-EVAL   [x] FD(61905) x  2  [] IONTO  [x] NMR (49428) x     [] VASO  [x] Manual (27042) x 1     [] Other: GT   [] TA (68010) x      [] Mech Traction (60877)  [] ES(attended) (34367)      [] ES (un) (83878):     GOALS:  Patient stated goal: walking dog  [] Progressing: [] Met: [] Not Met: [] Adjusted    Therapist goals for Patient:   Short Term Goals: To be achieved in: 2 weeks  1. Independent in HEP and progression per patient tolerance, in order to prevent re-injury. [] Progressing: [x] Met: [] Not Met: [] Adjusted   2. Patient will have a decrease in pain to facilitate improvement in movement, function, and ADLs as indicated by Functional Deficits. [] Progressing: [x] Met: [] Not Met: [] Adjusted     Long Term Goals: To be achieved in: 12 weeks  1. Disability index score of 20%% or less for the LEFS to assist with reaching prior level of function. [] Progressing: [] Met: [] Not Met: [] Adjusted   2. Patient will demonstrate increased AROM to 0-135 to allow for proper joint functioning as indicated by patients Functional Deficits. [x] Progressing: [] Met: [] Not Met: [] Adjusted   3. Patient will demonstrate an increase in Strength to good proximal hip strength and control, within 5lb HHD in LE to allow for proper functional mobility as indicated by patients Functional Deficits. [x] Progressing: [] Met: [] Not Met: [] Adjusted   4. Patient will return to 90% functional activities without increased symptoms or restriction. [x] Progressing: [] Met: [] Not Met: [] Adjusted   5. Pt will be able to walk dog w/o deviations (patient specific functional goal)    [x] Progressing: [] Met: [] Not Met: [] Adjusted       Overall Progression Towards Functional goals/ Treatment Progress Update:  [x] Patient is progressing as expected towards functional goals listed. [] Progression is slowed due to complexities/Impairments listed.   [] Progression has been slowed due to co-morbidities. [] Plan just implemented, too soon to assess goals progression <30days   [] Goals require adjustment due to lack of progress  [] Patient is not progressing as expected and requires additional follow up with physician  [] Other    ASSESSMENT:  Continued quad tightness noted. Pt able to get in to padded kneeling position and on/off floor with some cuing. Strategies for bathroom cleaning and kneeling at [employer]. Treatment/Activity Tolerance:  [x] Patient tolerated treatment well [] Patient limited by fatique  [] Patient limited by pain  [] Patient limited by other medical complications  [] Other:     Prognosis: [x] Good [] Fair  [] Poor    Patient Requires Follow-up: [x] Yes  [] No    PLAN:    Cont therapy for gt progression and functional activities.   [x] Continue per plan of care [] Alter current plan (see comments)  [] Plan of care initiated [] Hold pending MD visit [] Discharge    Electronically signed by: Deisy Moya PT, OMT-C

## 2020-02-03 RX ORDER — APIXABAN 5 MG/1
5 TABLET, FILM COATED ORAL 2 TIMES DAILY
Qty: 14 TABLET | Refills: 0 | Status: SHIPPED | OUTPATIENT
Start: 2020-02-03 | End: 2020-10-26 | Stop reason: ALTCHOICE

## 2020-02-03 NOTE — TELEPHONE ENCOUNTER
Pt is requesting samples of Eliquis 5 mg 1 bid. Pt needs enough for 30 days. After that she will no longer be on med.

## 2020-02-03 NOTE — TELEPHONE ENCOUNTER
I explained that we do not currently have samples in stock, We can send a weeks worth to her pharmacy. Pt only wants a weeks worth.

## 2020-02-04 ENCOUNTER — HOSPITAL ENCOUNTER (OUTPATIENT)
Dept: PHYSICAL THERAPY | Age: 70
Setting detail: THERAPIES SERIES
Discharge: HOME | End: 2020-02-04
Payer: MEDICARE

## 2020-02-04 PROCEDURE — 97110 THERAPEUTIC EXERCISES: CPT | Performed by: PHYSICAL THERAPIST

## 2020-02-04 PROCEDURE — 97140 MANUAL THERAPY 1/> REGIONS: CPT | Performed by: PHYSICAL THERAPIST

## 2020-02-04 NOTE — FLOWSHEET NOTE
[facility], [address] [place]  Phone: [phone]  Fax [phone]     Physical Therapy Treatment Note/ Progress Report:       Date:  2020    Patient Name:  Mc Webb    :  1950  MRN: [id number]  Restrictions/Precautions:    Medical/Treatment Diagnosis Information:  · Diagnosis: S82.201D, S82.401D (ICD-10-CM) - Closed fracture of right tibia and fibula with routine healing, subsequent encounter  Treatment Diagnosis: dec ROM, dec strength, gt deviations and pain  Insurance/Certification information:  PT Insurance Information: Medicare  Physician Information:  Referring Practitioner: Dr. Ayden Alfonso  Has the plan of care been signed (Y/N):        [x]  Yes  []  No 19    Date of Patient follow up with Physician: 20    Is this a Progress Report:     [x]  Yes  []  No     If Yes:  Date Range for reporting period:  Beginning 19  Ending 20    Progress report will be due (10 Rx or 30 days whichever is less):      Recertification will be due (POC Duration  / 90 days whichever is less): 3/12/20       Visit # Insurance Allowable Auth Required   15 [facility] []  Yes [x]  No        Functional Scale: 53.5%  Date assessed: 19      Latex Allergy:  [x]NO      []YES  Preferred Language for Healthcare:   [x]English       []other:    Pain level:  NT/10     SUBJECTIVE:  Pt reports foot pain is much improved. She is using her SPC less and finds herself misplacing it at times. Hoping to be able to increase walking her dog to 2x/day over the next week like she used to do.     OBJECTIVE:    Observation:   Test measurements:    OBJECTIVE:   Flexibility L R Comment   Hamstring   WNL 20   Gastroc   Mod tightness     ITB         Quad    mod tightness                               ROM PROM AROM Overpressure Comment     L R L R L R 20   Flexion     138 132         Extension     0 0

## 2020-02-06 ENCOUNTER — HOSPITAL ENCOUNTER (OUTPATIENT)
Dept: PHYSICAL THERAPY | Age: 70
Setting detail: THERAPIES SERIES
Discharge: HOME | End: 2020-02-06
Payer: MEDICARE

## 2020-02-06 PROCEDURE — 97140 MANUAL THERAPY 1/> REGIONS: CPT | Performed by: PHYSICAL THERAPIST

## 2020-02-06 PROCEDURE — 97110 THERAPEUTIC EXERCISES: CPT | Performed by: PHYSICAL THERAPIST

## 2020-02-06 NOTE — FLOWSHEET NOTE
[facility],  [address] [place]  Phone: [phone]  Fax [phone]     Physical Therapy Treatment Note/ Progress Report:       Date:  2020    Patient Name:  Sera Haynes    :  1950  MRN: [id number]  Restrictions/Precautions:    Medical/Treatment Diagnosis Information:  · Diagnosis: S82.201D, S82.401D (ICD-10-CM) - Closed fracture of right tibia and fibula with routine healing, subsequent encounter  Treatment Diagnosis: dec ROM, dec strength, gt deviations and pain  Insurance/Certification information:  PT Insurance Information: Medicare  Physician Information:  Referring Practitioner: Dr. Rebecca Araujo  Has the plan of care been signed (Y/N):        [x]  Yes  []  No 19    Date of Patient follow up with Physician: 20    Is this a Progress Report:     [x]  Yes  []  No     If Yes:  Date Range for reporting period:  Beginning 19  Ending 20    Progress report will be due (10 Rx or 30 days whichever is less):      Recertification will be due (POC Duration  / 90 days whichever is less): 3/12/20       Visit # Insurance Allowable Auth Required   13 [facility] []  Yes [x]  No        Functional Scale: 53.5%  Date assessed: 19      Latex Allergy:  [x]NO      []YES  Preferred Language for Healthcare:   [x]English       []other:    Pain level:  NT/10     SUBJECTIVE:  Pt reports some muscle soreness in her calf following her previous PT visit, but then has felt stronger ever since.     OBJECTIVE: PN NV   Observation:   Test measurements:    OBJECTIVE:   Flexibility L R Comment   Hamstring   WNL 20   Gastroc   Mod tightness     ITB         Quad    mod tightness                               ROM PROM AROM Overpressure Comment     L R L R L R 20   Flexion     138 132         Extension     0 0                                                   Strength L R Comment   Quad  4+ 20   Hamstring    4+     Gastroc          hip abd   4+                     RESTRICTIONS/PRECAUTIONS: WBAT on R LE; DVT x3 month; tibia plateau fracture on 7/55/04    Exercises/Interventions:   Exercise/Equipment Resistance/Repetitions Other comments   Cardio/Warm-up     Bike 5' Warm up, lvl4 resistance   Treadmill          Stretching     Hamstring B  30\"x3  man   Hip Flexion     ITB     Groin     Quad Supine mod jessa 3x30\"  man   Inclined Calf B 30\"x3 L2   Towel Pull     Tennis ball massage   Towel crunches  Add to HEP  Add to HEP   ROM     Passive     Active     Weight Shift     Weight Hangs     Sheet Pulls     Ankle Pumps           Patellar Glides     Medial    Superior    Inferior    Knee flexion mobs gr 3-4, STM quad x6'    STRENGTH     SLR  2#   Supine     Prone     Bridging +PS     Bridging      Abduction     Clams B  GVL in side lying   Adducton     SAQ SLR+      Standing H' flex, abd., K' flex  2#   Isometrics     Quad sets          CKC     Calf raises    Wall sits    Step up and over  8 in,   Step down lat 3x104 in   Step down 6 in   Reciprocal stairs w/ MB carry 5x 8# MB+1 HR    1 leg stand     Squatting/   Hands on plinthe   CC TKE     Balance      SLS air-ex +3 way kick x10 R/L Wall prn   Monster walks,      Glide disc ext, post-lat  Hands on 1/2 wall   Stand to 1/2 kneel x5 R/L UE assist + Air-ex pad for knee   On/off floor  Kneeling on pad     PRE     Extension  RANGE:   Flexion RANGE:   Leg Press RANGE:   RD abd  ext    Ed      GT      See ATC log X         Manual/Modalities          Reviewed all ex/HP       Therapeutic Exercise and NMR EXR  [x] (85662) Provided verbal/tactile cueing for activities related to strengthening, flexibility, endurance, ROM for improvements in LE, proximal hip, and core control with self care, mobility, lifting, ambulation.   [x] (88571) Provided verbal/tactile cueing for activities related to improving balance, coordination, kinesthetic sense, posture, motor skill, proprioception  to assist with LE, proximal hip, and core control in self care, mobility, lifting, ambulation and eccentric single leg control. NMR and Therapeutic Activities:    [] (62472 or 74585) Provided verbal/tactile cueing for activities related to improving balance, coordination, kinesthetic sense, posture, motor skill, proprioception and motor activation to allow for proper function of core, proximal hip and LE with self care and ADLs  [x] (98900) Gait Re-education- Provided training and instruction to the patient for proper LE, core and proximal hip recruitment and positioning and eccentric body weight control with ambulation re-education including up and down stairs     Home Exercise Program:    [x] (09579) Reviewed/Progressed HEP activities related to strengthening, flexibility, endurance, ROM of core, proximal hip and LE for functional self-care, mobility, lifting and ambulation/stair navigation         [] (63788)Reviewed/Progressed HEP activities related to improving balance, coordination, kinesthetic sense, posture, motor skill, proprioception of core, proximal hip and LE for self care, mobility, lifting, and ambulation/stair navigation      Manual Treatments:  PROM / STM / Oscillations-Mobs:  G-I, II, III, IV (PA's, Inf., Post.)  [] (67337) Provided manual therapy to mobilize LE, proximal hip and/or LS spine soft tissue/joints for the purpose of modulating pain, promoting relaxation,  increasing ROM, reducing/eliminating soft tissue swelling/inflammation/restriction, improving soft tissue extensibility and allowing for proper ROM for normal function with self care, mobility, lifting and ambulation. Modalities:  CP x10 min    [] GAME READY (VASO)- for significant edema, swelling, pain control.      Charges:  Timed Code Treatment Minutes: 40   Total Treatment Minutes: 60' (indep bike/CP)     [] EVAL (LOW) 28306 (typically 20 minutes face-to-face)  [] EVAL (MOD) 68479 (typically 30 minutes face-to-face)  [] EVAL (HIGH) 30168 (typically 45 minutes face-to-face)  [] RE-EVAL     [x] OE(42643) x  2  [] IONTO  [x] NMR (57160) x     [] VASO  [x] Manual (27664) x 1     [] Other: GT   [] TA (30611) x      [] Mech Traction (12114)  [] ES(attended) (22117)      [] ES (un) (46078):     GOALS:  Patient stated goal: walking dog  [] Progressing: [] Met: [] Not Met: [] Adjusted    Therapist goals for Patient:   Short Term Goals: To be achieved in: 2 weeks  1. Independent in HEP and progression per patient tolerance, in order to prevent re-injury. [] Progressing: [x] Met: [] Not Met: [] Adjusted   2. Patient will have a decrease in pain to facilitate improvement in movement, function, and ADLs as indicated by Functional Deficits. [] Progressing: [x] Met: [] Not Met: [] Adjusted     Long Term Goals: To be achieved in: 12 weeks  1. Disability index score of 20%% or less for the LEFS to assist with reaching prior level of function. [] Progressing: [] Met: [] Not Met: [] Adjusted   2. Patient will demonstrate increased AROM to 0-135 to allow for proper joint functioning as indicated by patients Functional Deficits. [x] Progressing: [] Met: [] Not Met: [] Adjusted   3. Patient will demonstrate an increase in Strength to good proximal hip strength and control, within 5lb HHD in LE to allow for proper functional mobility as indicated by patients Functional Deficits. [x] Progressing: [] Met: [] Not Met: [] Adjusted   4. Patient will return to 90% functional activities without increased symptoms or restriction. [x] Progressing: [] Met: [] Not Met: [] Adjusted   5. Pt will be able to walk dog w/o deviations (patient specific functional goal)    [x] Progressing: [] Met: [] Not Met: [] Adjusted       Overall Progression Towards Functional goals/ Treatment Progress Update:  [x] Patient is progressing as expected towards functional goals listed. [] Progression is slowed due to complexities/Impairments listed.   [] Progression has been

## 2020-02-11 ENCOUNTER — HOSPITAL ENCOUNTER (OUTPATIENT)
Dept: PHYSICAL THERAPY | Age: 70
Setting detail: THERAPIES SERIES
Discharge: HOME | End: 2020-02-11
Payer: MEDICARE

## 2020-02-11 PROCEDURE — 97140 MANUAL THERAPY 1/> REGIONS: CPT | Performed by: PHYSICAL THERAPIST

## 2020-02-11 PROCEDURE — 97110 THERAPEUTIC EXERCISES: CPT | Performed by: PHYSICAL THERAPIST

## 2020-02-11 NOTE — PROGRESS NOTES
proprioception  to assist with LE, proximal hip, and core control in self care, mobility, lifting, ambulation and eccentric single leg control. NMR and Therapeutic Activities:    [] (09000 or 20159) Provided verbal/tactile cueing for activities related to improving balance, coordination, kinesthetic sense, posture, motor skill, proprioception and motor activation to allow for proper function of core, proximal hip and LE with self care and ADLs  [x] (99470) Gait Re-education- Provided training and instruction to the patient for proper LE, core and proximal hip recruitment and positioning and eccentric body weight control with ambulation re-education including up and down stairs     Home Exercise Program:    [x] (96808) Reviewed/Progressed HEP activities related to strengthening, flexibility, endurance, ROM of core, proximal hip and LE for functional self-care, mobility, lifting and ambulation/stair navigation         [] (72188)Reviewed/Progressed HEP activities related to improving balance, coordination, kinesthetic sense, posture, motor skill, proprioception of core, proximal hip and LE for self care, mobility, lifting, and ambulation/stair navigation      Manual Treatments:  PROM / STM / Oscillations-Mobs:  G-I, II, III, IV (PA's, Inf., Post.)  [] (88479) Provided manual therapy to mobilize LE, proximal hip and/or LS spine soft tissue/joints for the purpose of modulating pain, promoting relaxation,  increasing ROM, reducing/eliminating soft tissue swelling/inflammation/restriction, improving soft tissue extensibility and allowing for proper ROM for normal function with self care, mobility, lifting and ambulation. Modalities:  CP x10 min    [] GAME READY (VASO)- for significant edema, swelling, pain control.      Charges:  Timed Code Treatment Minutes: 40   Total Treatment Minutes: 60' (indep bike/CP)     [] EVAL (LOW) 177 1011 (typically 20 minutes face-to-face)  [] EVAL (MOD) 20180 (typically 30 minutes face-to-face)  [] EVAL (HIGH) 64862 (typically 45 minutes face-to-face)  [] RE-EVAL     [x] ZL(69179) x  2  [] IONTO  [x] NMR (93713) x     [] VASO  [x] Manual (53141) x 1     [] Other: GT   [] TA (85396) x      [] Mech Traction (06244)  [] ES(attended) (32931)      [] ES (un) (58876):     GOALS:  Patient stated goal: walking dog  [] Progressing: [] Met: [] Not Met: [] Adjusted    Therapist goals for Patient:   Short Term Goals: To be achieved in: 2 weeks  1. Independent in HEP and progression per patient tolerance, in order to prevent re-injury. [] Progressing: [x] Met: [] Not Met: [] Adjusted   2. Patient will have a decrease in pain to facilitate improvement in movement, function, and ADLs as indicated by Functional Deficits. [] Progressing: [x] Met: [] Not Met: [] Adjusted     Long Term Goals: To be achieved in: 12 weeks  1. Disability index score of 20%% or less for the LEFS to assist with reaching prior level of function. [] Progressing: [] Met: [] Not Met: [] Adjusted   2. Patient will demonstrate increased AROM to 0-135 to allow for proper joint functioning as indicated by patients Functional Deficits. [] Progressing: [x] Met: [] Not Met: [] Adjusted   3. Patient will demonstrate an increase in Strength to good proximal hip strength and control, within 5lb HHD in LE to allow for proper functional mobility as indicated by patients Functional Deficits. [x] Progressing: [] Met: [] Not Met: [] Adjusted   4. Patient will return to 90% functional activities without increased symptoms or restriction. [x] Progressing: [] Met: [] Not Met: [] Adjusted   5. Pt will be able to walk dog w/o deviations (patient specific functional goal)    [x] Progressing: [] Met: [] Not Met: [] Adjusted       Overall Progression Towards Functional goals/ Treatment Progress Update:  [x] Patient is progressing as expected towards functional goals listed. [] Progression is slowed due to complexities/Impairments listed.   [] Progression has been slowed due to co-morbidities. [] Plan just implemented, too soon to assess goals progression <30days   [] Goals require adjustment due to lack of progress  [] Patient is not progressing as expected and requires additional follow up with physician  [] Other    ASSESSMENT:  Pt continues to progress with goals. ROM goal met, soreness in hips improved with stretches as above. Continued strength deficits R compared to L. Treatment/Activity Tolerance:  [x] Patient tolerated treatment well [] Patient limited by fatique  [] Patient limited by pain  [] Patient limited by other medical complications  [] Other:     Prognosis: [x] Good [] Fair  [] Poor    Patient Requires Follow-up: [x] Yes  [] No    PLAN:    Cont therapy for gt progression and functional activities.   [x] Continue per plan of care [] Alter current plan (see comments)  [] Plan of care initiated [] Hold pending MD visit [] Discharge    Electronically signed by: Yanet Tavarez PT, OMT-C

## 2020-02-11 NOTE — FLOWSHEET NOTE
[place] [facility], [address] [place]  Phone: [phone]  Fax [phone]      ATHLETIC TRAINING [address]  Date:  2020    Patient Name:  Deb Pantoja    :  1950  MRN: [id number]  Restrictions/Precautions:    Medical/Treatment Diagnosis Information:  ·  Closed fracture of R tibia and fibula with routine healing, subsequent encounter  ·     Physician Information:    Dr. Narinder Goodwin Post-op  8 wks  12 wks 16 wks 20 wks   24 wks                            Activity Log                                                  DOS/DOI:                                                    Date: 20   ATC communication     Hips very sore after last Rx, still sore up until today   Bike        Elliptical        Treadmill        Airdyne                Gastroc stretch        Soleus stretch        Hamstring stretch        ITB stretch        Hip Flexor stretch        Quad stretch        Adductor stretch                Weight Shifting sp                                  fp                                  tp        Lateral walking (with/w/o TB)                Balance: PEP/Jessica board                       SLS              Star excursion load/explode              Extremity reach UE/LE                Leg Press Candelario. 80# 30x 80# 45x 100# 3x10 IH 80# 20x IH 80# 2x12                     Ecc. 60# 30x 60# 45x 80# 3x10 IH 60# 20x Resume NV                     Inv. Calf Press Candelario.    100# 3x10 IH 80# 20x IH 80# 3x10                      Ecc.                            Inv.                RD   Flex                   ABd 30# R/L 20x 30# R/L 45x 45# R/L 3x10 45# R/L 3x12 45# R/L 3x10              ADd                  TKE 60# 20x5\" 60# 30x5\" 60# 30x5\" 75# 20x5\" 75# 25x5\"              Ext 30# R/L 20x 30# R/L 45x 45# R/L 3x10 60# R/L 2x10 60# R/L 2x12           Steps Up Up and Over                   Down                   Lateral                   Rotation                Squats  mini                      wall                     BOSU                 Lunges:  Lunge to Balance                       Balance to Lunge                       Walking                Knee Extension Bilat. Ecc.                                   Inv. Hamstring Curls Bilat. NV                              Ecc.                                   Inv.                Soleus Press Bilat. Ecc.                               Inv.                                         Ladders                    Square                   Jump/Hop  Low                          Med.                          High                                                                        Modality CP 15' CP 15' CP 15' CP 15' CP 13'   Initials                             EP EP DTM EP DTM   Time spent one on one (workers comp)        Time spent with PT assistant

## 2020-02-13 ENCOUNTER — HOSPITAL ENCOUNTER (OUTPATIENT)
Dept: PHYSICAL THERAPY | Age: 70
Setting detail: THERAPIES SERIES
Discharge: HOME | End: 2020-02-13
Payer: MEDICARE

## 2020-02-13 PROCEDURE — 97140 MANUAL THERAPY 1/> REGIONS: CPT | Performed by: PHYSICAL THERAPIST

## 2020-02-13 PROCEDURE — 97110 THERAPEUTIC EXERCISES: CPT | Performed by: PHYSICAL THERAPIST

## 2020-02-13 NOTE — FLOWSHEET NOTE
Quad 5 4+ 2/11   Hamstring  5  4+     Gastroc          hip abd   4+                     RESTRICTIONS/PRECAUTIONS: WBAT on R LE; DVT x3 month; tibia plateau fracture on 6/22/06    Exercises/Interventions:   Exercise/Equipment Resistance/Repetitions Other comments   Cardio/Warm-up     Bike 5' Warm up, lvl4 resistance   Treadmill          Stretching     Hamstring B  30\"x3  man   Hip Flexion     Piriformis S  Supine LTR 2x30\" R/L  x10 R/L HEP 2/11  HEP 2/11   Groin     Quad Supine mod jessa 3x30\"  man   Inclined Calf B 30\"x3 L2   Towel Pull     Tennis ball massage   Towel crunches  Add to HEP  Add to HEP   ROM     Passive     Active     Weight Shift     Weight Hangs     Sheet Pulls     Ankle Pumps           Patellar Glides     Medial    Superior    Inferior    Knee flexion mobs gr 3-4, STM quad x6'    STRENGTH     SLR  2#   Supine     Prone     Bridging +PS     Bridging      Abduction     Clams B  GVL in side lying   Adducton     SAQ SLR+      Standing H' flex, abd., K' flex  2#   Isometrics     Quad sets          CKC     Calf raises    Wall sits    Step up and over  8 in,   Step down lat 4 in   Step down 6 in   Reciprocal stairs w/ MB carry     1 leg stand     Squatting/   Hands on plinthe   CC TKE     Balance      Wall prn   Monster walks,     Glide disc ext, post-lat RVL x10 ea R/L Hands on 1/2 wall   Stand to 1/2 kneel  UE assist + Air-ex pad for knee   On/off floor  Kneeling on pad     PRE     CC walkouts 4 ways 25# x10 ea SBA initially for all directions   Extension  RANGE:   Flexion RANGE:   Leg Press RANGE:   [facility] abd  ext    Ed      GT      See ATC log X         Manual/Modalities          Reviewed all ex/HP       Therapeutic Exercise and NMR EXR  [x] (01028) Provided verbal/tactile cueing for activities related to strengthening, flexibility, endurance, ROM for improvements in LE, proximal hip, and core control with self care, mobility, lifting, ambulation.   [x] (17859) Provided verbal/tactile cueing for activities related to improving balance, coordination, kinesthetic sense, posture, motor skill, proprioception  to assist with LE, proximal hip, and core control in self care, mobility, lifting, ambulation and eccentric single leg control. NMR and Therapeutic Activities:    [] (04242 or 47977) Provided verbal/tactile cueing for activities related to improving balance, coordination, kinesthetic sense, posture, motor skill, proprioception and motor activation to allow for proper function of core, proximal hip and LE with self care and ADLs  [x] (09033) Gait Re-education- Provided training and instruction to the patient for proper LE, core and proximal hip recruitment and positioning and eccentric body weight control with ambulation re-education including up and down stairs     Home Exercise Program:    [x] (64518) Reviewed/Progressed HEP activities related to strengthening, flexibility, endurance, ROM of core, proximal hip and LE for functional self-care, mobility, lifting and ambulation/stair navigation         [] (13044)Reviewed/Progressed HEP activities related to improving balance, coordination, kinesthetic sense, posture, motor skill, proprioception of core, proximal hip and LE for self care, mobility, lifting, and ambulation/stair navigation      Manual Treatments:  PROM / STM / Oscillations-Mobs:  G-I, II, III, IV (PA's, Inf., Post.)  [] (42921) Provided manual therapy to mobilize LE, proximal hip and/or LS spine soft tissue/joints for the purpose of modulating pain, promoting relaxation,  increasing ROM, reducing/eliminating soft tissue swelling/inflammation/restriction, improving soft tissue extensibility and allowing for proper ROM for normal function with self care, mobility, lifting and ambulation. Modalities:  CP x10 min    [] GAME READY (VASO)- for significant edema, swelling, pain control.      Charges:  Timed Code Treatment Minutes: 40   Total Treatment Minutes: 60' (indep bike/CP)     [] EVAL functional goals listed. [] Progression is slowed due to complexities/Impairments listed. [] Progression has been slowed due to co-morbidities. [] Plan just implemented, too soon to assess goals progression <30days   [] Goals require adjustment due to lack of progress  [] Patient is not progressing as expected and requires additional follow up with physician  [] Other    ASSESSMENT:  Hip and quad fatigue with resumed glide disc work and following initiated CC walkouts. Challenged with balance and control initially with walk outs necessitating SBA, but indep by end w/ all directions and pt verbalized she liked this exercise, as it reminded her of walking her dog when her dog pulls. Treatment/Activity Tolerance:  [x] Patient tolerated treatment well [] Patient limited by fatique  [] Patient limited by pain  [] Patient limited by other medical complications  [] Other:     Prognosis: [x] Good [] Fair  [] Poor    Patient Requires Follow-up: [x] Yes  [] No    PLAN:  trial wean to 1x/week  Cont therapy for gt progression and functional activities.   [] Continue per plan of care [x] Alter current plan (see comments)  [] Plan of care initiated [] Hold pending MD visit [] Discharge    Electronically signed by: Clem Yee PT, OMT-C

## 2020-02-18 ENCOUNTER — HOSPITAL ENCOUNTER (OUTPATIENT)
Dept: PHYSICAL THERAPY | Age: 70
Setting detail: THERAPIES SERIES
Discharge: HOME | End: 2020-02-18
Payer: MEDICARE

## 2020-02-18 PROCEDURE — 97140 MANUAL THERAPY 1/> REGIONS: CPT | Performed by: PHYSICAL THERAPIST

## 2020-02-18 PROCEDURE — 97110 THERAPEUTIC EXERCISES: CPT | Performed by: PHYSICAL THERAPIST

## 2020-02-18 NOTE — FLOWSHEET NOTE
[facility] [address] [place]  Phone: [phone]  Fax [phone]      ATHLETIC TRAINING [address]  Date:  2020    Patient Name:  [place] Romo    :  1950  MRN: [id number]  Restrictions/Precautions:    Medical/Treatment Diagnosis Information:  ·  Closed fracture of R tibia and fibula with routine healing, subsequent encounter  ·     Physician Information:    Dr. Jaye Jaimes Post-op  8 wks  12 wks 16 wks 20 wks   24 wks                            Activity Log                                                  DOS/DOI:                                                    Date: 20   ATC communication     Hips very sore after last Rx, still sore up until today     Bike          Elliptical          Treadmill          Airdyne                    Gastroc stretch          Soleus stretch          Hamstring stretch          ITB stretch          Hip Flexor stretch          Quad stretch          Adductor stretch                    Weight Shifting sp                                    fp                                    tp          Lateral walking (with/w/o TB)                    Balance: PEP/Jessica board                         SLS                Star excursion load/explode                Extremity reach UE/LE                    Leg Press Candelario. 80# 30x 80# 45x 100# 3x10 IH 80# 20x IH 80# 2x12 80# 30x # 30x                     Ecc. 60# 30x 60# 45x 80# 3x10 IH 60# 20x Resume NV 80# 30x IH 80# 30x                     Inv. Calf Press Candelario.    100# 3x10 IH 80# 20x IH 80# 3x10 80# 30x IH 80# 36x                      Ecc.                              Inv.                    RD   Flex                     ABd 30# R/L 20x 30# R/L 45x 45# R/L 3x10 45# R/L 3x12 45# R/L 3x10  45# R/L 3x12              ADd                    TKE 60# 20x5\" 60#

## 2020-02-18 NOTE — FLOWSHEET NOTE
[place] [facility] [facility], [address] [place]  Phone: [phone]  Fax [phone]     Physical Therapy Treatment Note/ Progress Report:       Date:  2020    Patient Name:  Shala Adnino    :  1950  MRN: [id number]  Restrictions/Precautions:    Medical/Treatment Diagnosis Information:  · Diagnosis: S82.201D, S82.401D (ICD-10-CM) - Closed fracture of right tibia and fibula with routine healing, subsequent encounter  Treatment Diagnosis: dec ROM, dec strength, gt deviations and pain  Insurance/Certification information:  PT Insurance Information: Medicare  Physician Information:  Referring Practitioner: Dr. Navya Og  Has the plan of care been signed (Y/N):        [x]  Yes  []  No 19    Date of Patient follow up with Physician: 20    Is this a Progress Report:     [x]  Yes  []  No     If Yes:  Date Range for reporting period:  Beginning 19  Ending 20    Progress report will be due (10 Rx or 30 days whichever is less):      Recertification will be due (POC Duration  / 90 days whichever is less): 3/12/20       Visit # Insurance Allowable Auth Required   [address] []  Yes [x]  No        Functional Scale: 53.5%  Date assessed: 19      Latex Allergy:  [x]NO      []YES  Preferred Language for Healthcare:   [x]English       []other:    Pain level:  NT/10     SUBJECTIVE:  Pt reports no c/o's following last visit. Has not been using her SPC anymore.     OBJECTIVE:     Observation:   Test measurements:    OBJECTIVE:   Flexibility L R Comment   Hamstring   WNL 20   Gastroc        ITB         Quad   Mild tightness                               ROM PROM AROM Overpressure Comment     L R L R L R /   Flexion     138 135         Extension     0 0                                                   Strength L R Comment   Quad 5 4+ /11   Hamstring  5  4+     Gastroc          hip abd   4+                   RESTRICTIONS/PRECAUTIONS: WBAT on R LE; DVT x3 month; tibia plateau fracture on 6/42/19    Exercises/Interventions:   Exercise/Equipment Resistance/Repetitions Other comments   Cardio/Warm-up     Bike 5' Warm up, lvl4 resistance   Treadmill          Stretching     Hamstring B  30\"x3  man   Hip Flexion     Piriformis S  Supine LTR  HEP 2/11  HEP 2/11   Groin     Quad Supine mod jessa 3x30\"  man   Inclined Calf B 30\"x3 L2   Towel Pull     Tennis ball massage   Towel crunches  Add to HEP  Add to HEP   ROM     Passive     Active     Weight Shift     Weight Hangs     Sheet Pulls     Ankle Pumps           Patellar Glides     Medial    Superior    Inferior    Knee flexion mobs gr 3-4, STM quad x6'    STRENGTH     SLR  2#   Supine     Prone     Bridging +PS     Bridging      Abduction     Clams B  GVL in side lying   Adducton     SAQ SLR+      Standing H' flex, abd., K' flex  2#   Isometrics     Quad sets          CKC     Calf raises    Wall sits    Step up and over  8 in,   Step down lat 4 in   Step down 6 in   Reciprocal stairs w/ MB carry     1 leg stand     Squatting/   Hands on plinthe   CC TKE     Balance      hari disc wt shift to hip abd 2x10 R/LWall prn    Fingers on [facility]   Monster walks,     Glide disc ext, post-lat  Hands on 1/2 wall   Stand to 1/2 kneel  UE assist + Air-ex pad for knee   On/off floor  Kneeling on pad     PRE     CC walkouts 4 ways 25# x10 ea SBA for R side step out, added SLB last rep fwd/bwd   Extension  RANGE:   Flexion RANGE:   Leg Press RANGE:   [facility] abd  ext    Ed      GT      See ATC log X         Manual/Modalities          Reviewed all ex/HP       Therapeutic Exercise and NMR EXR  [x] (74617) Provided verbal/tactile cueing for activities related to strengthening, flexibility, endurance, ROM for improvements in LE, proximal hip, and core control with self care, mobility, lifting, ambulation.   [x] (21012) Provided verbal/tactile cueing for activities related to improving balance, face-to-face)  [] EVAL (MOD) 68727 (typically 30 minutes face-to-face)  [] EVAL (HIGH) 89095 (typically 45 minutes face-to-face)  [] RE-EVAL     [x] MX(72787) x  2  [] IONTO  [x] NMR (19064) x     [] VASO  [x] Manual (85854) x 1     [] Other: GT   [] TA (64858) x      [] Mech Traction (68819)  [] ES(attended) (88169)      [] ES (un) (16184):     GOALS:  Patient stated goal: walking dog  [] Progressing: [] Met: [] Not Met: [] Adjusted    Therapist goals for Patient:   Short Term Goals: To be achieved in: 2 weeks  1. Independent in HEP and progression per patient tolerance, in order to prevent re-injury. [] Progressing: [x] Met: [] Not Met: [] Adjusted   2. Patient will have a decrease in pain to facilitate improvement in movement, function, and ADLs as indicated by Functional Deficits. [] Progressing: [x] Met: [] Not Met: [] Adjusted     Long Term Goals: To be achieved in: 12 weeks  1. Disability index score of 20%% or less for the LEFS to assist with reaching prior level of function. [] Progressing: [] Met: [] Not Met: [] Adjusted   2. Patient will demonstrate increased AROM to 0-135 to allow for proper joint functioning as indicated by patients Functional Deficits. [] Progressing: [x] Met: [] Not Met: [] Adjusted   3. Patient will demonstrate an increase in Strength to good proximal hip strength and control, within 5lb HHD in LE to allow for proper functional mobility as indicated by patients Functional Deficits. [x] Progressing: [] Met: [] Not Met: [] Adjusted   4. Patient will return to 90% functional activities without increased symptoms or restriction. [x] Progressing: [] Met: [] Not Met: [] Adjusted   5. Pt will be able to walk dog w/o deviations (patient specific functional goal)    [x] Progressing: [] Met: [] Not Met: [] Adjusted       Overall Progression Towards Functional goals/ Treatment Progress Update:  [x] Patient is progressing as expected towards functional goals listed.     [] Progression is slowed due to complexities/Impairments listed. [] Progression has been slowed due to co-morbidities. [] Plan just implemented, too soon to assess goals progression <30days   [] Goals require adjustment due to lack of progress  [] Patient is not progressing as expected and requires additional follow up with physician  [] Other    ASSESSMENT:  Improved control with CC walkouts, except for R side step out still SBA initially. Good challenge with hari disc balance with mild instability noted. Treatment/Activity Tolerance:  [x] Patient tolerated treatment well [] Patient limited by fatique  [] Patient limited by pain  [] Patient limited by other medical complications  [] Other:     Prognosis: [x] Good [] Fair  [] Poor    Patient Requires Follow-up: [x] Yes  [] No    PLAN:  trial wean to 1x/week  Cont therapy for gt progression and functional activities.   [] Continue per plan of care [x] Alter current plan (see comments)  [] Plan of care initiated [] Hold pending MD visit [] Discharge    Electronically signed by: Fidel Mohan PT, OMT-C

## 2020-02-20 ENCOUNTER — APPOINTMENT (OUTPATIENT)
Dept: PHYSICAL THERAPY | Age: 70
End: 2020-02-20
Payer: MEDICARE

## 2020-02-25 ENCOUNTER — HOSPITAL ENCOUNTER (OUTPATIENT)
Dept: PHYSICAL THERAPY | Age: 70
Setting detail: THERAPIES SERIES
Discharge: HOME | End: 2020-02-25
Payer: MEDICARE

## 2020-02-25 PROCEDURE — 97140 MANUAL THERAPY 1/> REGIONS: CPT | Performed by: PHYSICAL THERAPIST

## 2020-02-25 PROCEDURE — 97110 THERAPEUTIC EXERCISES: CPT | Performed by: PHYSICAL THERAPIST

## 2020-02-25 NOTE — DISCHARGE SUMMARY
[place] [facility],  [address] [place]  Phone: [phone]  Fax [phone]     Physical Therapy Treatment Note/ Progress Report:       Date:  2020    Patient Name:  Leanne Patrick    :  1950  MRN: [id number]  Restrictions/Precautions:    Medical/Treatment Diagnosis Information:  · Diagnosis: S82.201D, S82.401D (ICD-10-CM) - Closed fracture of right tibia and fibula with routine healing, subsequent encounter  Treatment Diagnosis: dec ROM, dec strength, gt deviations and pain  Insurance/Certification information:  PT Insurance Information: Medicare  Physician Information:  Referring Practitioner: Dr. Kathy Osorio  Has the plan of care been signed (Y/N):        [x]  Yes  []  No 19    Date of Patient follow up with Physician: 20    Is this a Progress Report:     [x]  Yes  []  No     If Yes:  Date Range for reporting period:  Beginning 19  Ending 20    Progress report will be due (10 Rx or 30 days whichever is less): 91     Recertification will be due (POC Duration  / 90 days whichever is less): 3/12/20       Visit # Insurance Allowable Auth Required   [facility] []  Yes [x]  No        Functional Scale: LEFS 53.5%  Date assessed: 19    LEFS 19%             2020    Pt practice pattern G, comorbidities: 1-2, non-surgical    Latex Allergy:  [x]NO      []YES  Preferred Language for Healthcare:   [x]English       []other:    Pain level:  eval 5/10 , current 0/10    SUBJECTIVE:  Pt reports she is back to work now and was able to kneel for [employer] at [employer]. Feels very happy with her progress and abilities.     OBJECTIVE:     Observation:   Test measurements:    OBJECTIVE:   Flexibility L R Comment   Hamstring   WNL 20   Gastroc        ITB         Quad   Mild tightness                               ROM PROM AROM Overpressure Comment     L R L R L R    Flexion     138 135       control. Charges:  Timed Code Treatment Minutes: 40   Total Treatment Minutes: 60' (indep bike/CP)     [] EVAL (LOW) 49107 (typically 20 minutes face-to-face)  [] EVAL (MOD) 64206 (typically 30 minutes face-to-face)  [] EVAL (HIGH) 03674 (typically 45 minutes face-to-face)  [] RE-EVAL     [x] UK(25664) x  2  [] IONTO  [x] NMR (11788) x     [] VASO  [x] Manual (52050) x 1     [] Other: GT   [] TA (74312) x      [] Mech Traction (15974)  [] ES(attended) (63123)      [] ES (un) (47054):     GOALS:  Patient stated goal: walking dog  [] Progressing: [] Met: [] Not Met: [] Adjusted    Therapist goals for Patient:   Short Term Goals: To be achieved in: 2 weeks  1. Independent in HEP and progression per patient tolerance, in order to prevent re-injury. [] Progressing: [x] Met: [] Not Met: [] Adjusted   2. Patient will have a decrease in pain to facilitate improvement in movement, function, and ADLs as indicated by Functional Deficits. [] Progressing: [x] Met: [] Not Met: [] Adjusted     Long Term Goals: To be achieved in: 12 weeks  1. Disability index score of 20%% or less for the LEFS to assist with reaching prior level of function. [] Progressing: [x] Met: [] Not Met: [] Adjusted   2. Patient will demonstrate increased AROM to 0-135 to allow for proper joint functioning as indicated by patients Functional Deficits. [] Progressing: [x] Met: [] Not Met: [] Adjusted   3. Patient will demonstrate an increase in Strength to good proximal hip strength and control, within 5lb HHD in LE to allow for proper functional mobility as indicated by patients Functional Deficits. [] Progressing: [x] Met: [] Not Met: [] Adjusted   4. Patient will return to 90% functional activities without increased symptoms or restriction. [] Progressing: [x] Met: [] Not Met: [] Adjusted   5.  Pt will be able to walk dog w/o deviations (patient specific functional goal)    [] Progressing: [x] Met: [] Not Met: [] Adjusted       Overall Progression Towards Functional goals/ Treatment Progress Update:  [x] Patient is progressing as expected towards functional goals listed. [] Progression is slowed due to complexities/Impairments listed. [] Progression has been slowed due to co-morbidities. [] Plan just implemented, too soon to assess goals progression <30days   [] Goals require adjustment due to lack of progress  [] Patient is not progressing as expected and requires additional follow up with physician  [] Other    ASSESSMENT: pt has met all goals at this time and reports good understanding of HEP and importance of continued strengthening, michelle for hip abd. Ready to D/C to indep HEP.     Treatment/Activity Tolerance:  [x] Patient tolerated treatment well [] Patient limited by fatique  [] Patient limited by pain  [] Patient limited by other medical complications  [] Other:     Prognosis: [x] Good [] Fair  [] Poor    Patient Requires Follow-up: [] Yes  [x] No    PLAN:    [] Continue per plan of care [] Alter current plan (see comments)  [] Plan of care initiated [] Hold pending MD visit [x] Discharge    Electronically signed by: Lavelle Cornejo PT, OMT-C

## 2020-02-25 NOTE — FLOWSHEET NOTE
Ext 45# R/L 3x10 60# R/L 2x10 60# R/L 2x12  60# R/L 3x12 60# R/L 3x15            Steps Up                    Up and Over                    Down                    Lateral                    Rotation                  Squats  mini                       wall                      BOSU                  Lunges:  Lunge to Balance    Rxx ISO 4x30\"    xWx ISO 4x30\"                    Balance to Lunge                        Walking                  Knee Extension Bilat. Ecc.                                    Inv. Hamstring Curls Bilat. NV 30# 20x S IH 40# 24x IH 40# 30x                              Ecc.                                    Inv.                  Soleus Press Bilat. IH 40# 30x IH 40# 30x                          Ecc.                                Inv.                                            Ladders                     Square                    Jump/Hop  Low                           Med.                           High                                                                           Modality CP 15' CP 15' CP 15' CP 15' CP 15' CP 15'   Initials                             DTM EP DTM EP EP DB   Time spent one on one (workers comp)         Time spent with PT assistant

## 2020-02-27 ENCOUNTER — APPOINTMENT (OUTPATIENT)
Dept: PHYSICAL THERAPY | Age: 70
End: 2020-02-27
Payer: MEDICARE

## 2020-03-06 RX ORDER — METOPROLOL SUCCINATE 25 MG/1
TABLET, EXTENDED RELEASE ORAL
Qty: 90 TABLET | Refills: 3 | Status: SHIPPED | OUTPATIENT
Start: 2020-03-06 | End: 2020-10-26 | Stop reason: SDUPTHER

## 2020-03-06 NOTE — TELEPHONE ENCOUNTER
10/22/2019 P  Plan:  1. I recommend that the patient continue their currently prescribed medications. Their drug modifiable risk factors appear to be well controlled. I will continue to address the need/dosing of medications in future visits. 2. The patient was seen for >25 minutes. >50% of the time was devoted to giving the patient detailed instructions instructions on addressing diet, regular exercise, weight control, smoking abstention, medication compliance, and stress minimization. The patient was provided written and verbal instructions regarding risk factor modification.    3. Follow up with me in 1 year.

## 2020-04-06 RX ORDER — LISINOPRIL 2.5 MG/1
TABLET ORAL
Qty: 90 TABLET | Refills: 1 | Status: SHIPPED | OUTPATIENT
Start: 2020-04-06 | End: 2020-09-28

## 2020-05-08 ENCOUNTER — HOSPITAL ENCOUNTER (OUTPATIENT)
Dept: CT IMAGING | Age: 70
Discharge: HOME | End: 2020-05-08
Payer: MEDICARE

## 2020-05-08 PROCEDURE — 74176 CT ABD & PELVIS W/O CONTRAST: CPT

## 2020-05-08 PROCEDURE — 6360000004 HC RX CONTRAST MEDICATION: Performed by: INTERNAL MEDICINE

## 2020-05-08 RX ADMIN — IOHEXOL 50 ML: 240 INJECTION, SOLUTION INTRATHECAL; INTRAVASCULAR; INTRAVENOUS; ORAL at 10:10

## 2020-06-15 RX ORDER — ATORVASTATIN CALCIUM 40 MG/1
TABLET, FILM COATED ORAL
Qty: 90 TABLET | Refills: 2 | Status: SHIPPED | OUTPATIENT
Start: 2020-06-15 | End: 2020-10-26 | Stop reason: SDUPTHER

## 2020-09-28 RX ORDER — LISINOPRIL 2.5 MG/1
TABLET ORAL
Qty: 90 TABLET | Refills: 0 | Status: SHIPPED | OUTPATIENT
Start: 2020-09-28 | End: 2020-10-05

## 2020-10-05 RX ORDER — LISINOPRIL 2.5 MG/1
TABLET ORAL
Qty: 90 TABLET | Refills: 0 | Status: SHIPPED | OUTPATIENT
Start: 2020-10-05 | End: 2020-10-26 | Stop reason: SDUPTHER

## 2020-10-26 ENCOUNTER — OFFICE VISIT (OUTPATIENT)
Dept: CARDIOLOGY CLINIC | Age: 70
End: 2020-10-26
Payer: MEDICARE

## 2020-10-26 VITALS
BODY MASS INDEX: 25.08 KG/M2 | SYSTOLIC BLOOD PRESSURE: 116 MMHG | OXYGEN SATURATION: 96 % | DIASTOLIC BLOOD PRESSURE: 70 MMHG | HEART RATE: 81 BPM | HEIGHT: 65 IN | WEIGHT: 150.5 LBS

## 2020-10-26 PROBLEM — I25.5 ISCHEMIC CARDIOMYOPATHY: Status: ACTIVE | Noted: 2020-10-26

## 2020-10-26 PROCEDURE — 1090F PRES/ABSN URINE INCON ASSESS: CPT | Performed by: INTERNAL MEDICINE

## 2020-10-26 PROCEDURE — G8427 DOCREV CUR MEDS BY ELIG CLIN: HCPCS | Performed by: INTERNAL MEDICINE

## 2020-10-26 PROCEDURE — G8417 CALC BMI ABV UP PARAM F/U: HCPCS | Performed by: INTERNAL MEDICINE

## 2020-10-26 PROCEDURE — 1123F ACP DISCUSS/DSCN MKR DOCD: CPT | Performed by: INTERNAL MEDICINE

## 2020-10-26 PROCEDURE — 99213 OFFICE O/P EST LOW 20 MIN: CPT | Performed by: INTERNAL MEDICINE

## 2020-10-26 PROCEDURE — G8484 FLU IMMUNIZE NO ADMIN: HCPCS | Performed by: INTERNAL MEDICINE

## 2020-10-26 PROCEDURE — G8400 PT W/DXA NO RESULTS DOC: HCPCS | Performed by: INTERNAL MEDICINE

## 2020-10-26 PROCEDURE — 3017F COLORECTAL CA SCREEN DOC REV: CPT | Performed by: INTERNAL MEDICINE

## 2020-10-26 PROCEDURE — 4040F PNEUMOC VAC/ADMIN/RCVD: CPT | Performed by: INTERNAL MEDICINE

## 2020-10-26 PROCEDURE — 1036F TOBACCO NON-USER: CPT | Performed by: INTERNAL MEDICINE

## 2020-10-26 RX ORDER — METOPROLOL SUCCINATE 25 MG/1
TABLET, EXTENDED RELEASE ORAL
Qty: 90 TABLET | Refills: 3 | Status: SHIPPED | OUTPATIENT
Start: 2020-10-26 | End: 2021-03-05

## 2020-10-26 RX ORDER — ATORVASTATIN CALCIUM 40 MG/1
TABLET, FILM COATED ORAL
Qty: 90 TABLET | Refills: 3 | Status: SHIPPED | OUTPATIENT
Start: 2020-10-26 | End: 2021-03-22

## 2020-10-26 RX ORDER — LISINOPRIL 2.5 MG/1
TABLET ORAL
Qty: 90 TABLET | Refills: 3 | Status: SHIPPED | OUTPATIENT
Start: 2020-10-26 | End: 2021-05-26

## 2020-10-26 NOTE — LETTER
[address]  [facility]   Cardiovascular Evaluation    PATIENT: Wing Olivares  DATE: 10/26/2020  MRN: <[id number]>  CSN: [id number]  : 1950    Primary Care Doctor: Francheska Mcconnell MD    Reason for evaluation/Chief complaint:   1 Year Follow Up; Coronary Artery Disease; Hyperlipidemia; and Hypertension      Subjective:    History of present illness on initial date of evaluation:   Wing Olivares is a 78 y.o. patient who presents for follow up. Today she reports that she has been feeling well since her last visit. She has completed her course of Eliquis that she was taking for the clot in her broken leg. She has had no recurrence of the pain that she felt prior to her stenting in . She tolerates activity well without symptoms. Milady Bermeo has been taking her medications as prescribed without side effects. Patient Active Problem List   Diagnosis    Localized osteoarthrosis, lower leg    Chondromalacia of patella    Knee pain    BP (high blood pressure)    HLD (hyperlipidemia)    Tear of lateral cartilage or meniscus of knee, current    Chest pain    Coronary artery disease involving native coronary artery of native heart without angina pectoris 2015 HERNANDO to LAD PTCA jailed diag 2    HTN (hypertension)    S/P PTCA (percutaneous transluminal coronary angioplasty)    Urinary retention    Hematuria    Acute blood loss anemia    Renal mass    Renal cell carcinoma of right kidney (HCC)    Iron deficiency anemia due to chronic blood loss    Tibia/fibula fracture    Tibia/fibula fracture, right, closed, with routine healing, subsequent encounter    Ischemic cardiomyopathy, prior to stenting 45%, resolved. Cardiac Testing: I have reviewed the findings below.   EKG:  ECHO:   STRESS TEST:  CATH:  BYPASS:  VASCULAR:    Past Medical History:   has a past medical history of CAD (coronary artery disease), Cancer ([address] Chest pain, Family history of early CAD, Hyperlipidemia, Hypertension, and Knee pain. Surgical History:   has a past surgical history that includes  section; Percutaneous Transluminal Coronary Angio (2015); eye surgery; Coronary angioplasty with stent (2015); Cardiac surgery; and total nephrectomy (Right, 16). Social History:   reports that she has never smoked. She has never used smokeless tobacco. She reports current alcohol use. She reports that she does not use drugs. Family History:  No evidence for sudden cardiac death or premature CAD    Medications:  Reviewed and are listed in nursing record. and/or listed below  Outpatient Medications:  Prior to Admission medications    Medication Sig Start Date End Date Taking? Authorizing Provider   lisinopril (PRINIVIL;ZESTRIL) 2.5 MG tablet TAKE ONE TABLET BY MOUTH DAILY 10/26/20  Yes Juan Mendoza MD   atorvastatin (LIPITOR) 40 MG tablet TAKE 1 TABLET BY MOUTH ONE TIME A DAY 10/26/20  Yes Juan Mendoza MD   metoprolol succinate (TOPROL XL) 25 MG extended release tablet TAKE 1 TABLET BY MOUTH EVERY DAY 10/26/20  Yes Juan Mendoza MD   aspirin 81 MG tablet Take 81 mg by mouth daily   Yes Historical Provider, MD   nitroGLYCERIN (NITROSTAT) 0.4 MG SL tablet Place 1 tablet under the tongue every 5 minutes as needed for Chest pain 10/22/18  Yes Juan Mendoza MD   Coenzyme Q10 (CO Q 10 PO) Take by mouth   Yes Historical Provider, MD       In-patient schedule medications:        Infusion Medications: Allergies:  Neomycin-bacitracin zn-polymyx; Neosporin [bacitracin-neomycin-polymyxin]; and Neomycin     Review of Systems:   All 14 point review of symptoms completed. Pertinent positives identified in the HPI, all other review of symptoms findings as below.      Review of Systems - History obtained from the patient  General ROS: negative for - chills, fever or night sweats  Psychological ROS: negative for - disorientation or hallucinations Heart:  Regular rhythm and normal rate; S1, S2 are normal; no murmur noted; no rub or gallop   Abdomen:   Soft, non-tender, bowel sounds active all four quadrants,  no masses, no organomegaly           Extremities: Extremities normal, atraumatic, no cyanosis or edema   Pulses: 2+ and symmetric   Skin: Skin color, texture, turgor normal, no rashes or lesions   Pysch: Normal mood and affect   Neurologic: Normal gross motor and sensory exam.         Labs  No results for input(s): WBC, HGB, HCT, MCV, PLT in the last 72 hours. No results for input(s): CREATININE, BUN, NA, K, CL, CO2 in the last 72 hours. No results for input(s): INR, PROTIME in the last 72 hours. No results for input(s): TROPONINI in the last 72 hours. Invalid input(s): PRO-BNP  No results for input(s): CHOL, HDL in the last 72 hours. Invalid input(s): LDL, TG      Imaging:  I have reviewed the below testing personally and my interpretation is below. EKG:  CXR:      Assessment:  78 y.o. patient with:  Problem List Items Addressed This Visit     HLD (hyperlipidemia)    Relevant Medications    lisinopril (PRINIVIL;ZESTRIL) 2.5 MG tablet    atorvastatin (LIPITOR) 40 MG tablet    metoprolol succinate (TOPROL XL) 25 MG extended release tablet    Coronary artery disease involving native coronary artery of native heart without angina pectoris 6/2015 HERNANDO to LAD PTCA jailed diag 2 - Primary    Relevant Medications    lisinopril (PRINIVIL;ZESTRIL) 2.5 MG tablet    atorvastatin (LIPITOR) 40 MG tablet    metoprolol succinate (TOPROL XL) 25 MG extended release tablet    HTN (hypertension)    Ischemic cardiomyopathy, prior to stenting 45%, resolved. Plan:  1. I recommend that the patient continue their currently prescribed medications. Their drug modifiable risk factors appear to be well controlled. I will continue to address the need/dosing of medications in future visits.     ~HTN and LIPIDS are stable 2. The patient was seen for 25 minutes. >50% of the time was devoted to giving the patient detailed instructions instructions on addressing diet, regular exercise, weight control, smoking abstention, medication compliance, and stress minimization. The patient was provided written and verbal instructions regarding risk factor modification. 3. Follow up with me in 1 year. This note was scribed in the presence of Dr. Ifrah Yanez MD by Zaida Dixon RN.      I, Dr. Ifrah Yanez, personally performed the services described in this documentation, as scribed by the above signed scribe in my presence. It is both accurate and complete to my knowledge. I agree with the details independently gathered by the clinical support staff, while the remaining scribed note accurately describes my personal service to the patient. All questions and concerns were addressed to the patient/family. Alternatives to my treatment were discussed. The note was completed using EMR. Every effort was made to ensure accuracy; however, inadvertent computerized transcription errors may be present.     Ifrah Yanez MD, [address], [place], [place]  [phone] [facility] office  [phone] Main central  10/26/2020  1:36 PM

## 2020-10-26 NOTE — PROGRESS NOTES
[address] [facility]   Cardiovascular Evaluation    PATIENT: Riky Mares  DATE: 10/26/2020  MRN: <[id number]>  CSN: [id number]  : 1950    Primary Care Doctor: Ab Heredia MD    Reason for evaluation/Chief complaint:   1 Year Follow Up; Coronary Artery Disease; Hyperlipidemia; and Hypertension      Subjective:    History of present illness on initial date of evaluation:   Riky Mares is a 78 y.o. patient who presents for follow up. Today she reports that she has been feeling well since her last visit. She has completed her course of Eliquis that she was taking for the clot in her broken leg. She has had no recurrence of the pain that she felt prior to her stenting in . She tolerates activity well without symptoms. Kandi Abbott has been taking her medications as prescribed without side effects. Patient Active Problem List   Diagnosis    Localized osteoarthrosis, lower leg    Chondromalacia of patella    Knee pain    BP (high blood pressure)    HLD (hyperlipidemia)    Tear of lateral cartilage or meniscus of knee, current    Chest pain    Coronary artery disease involving native coronary artery of native heart without angina pectoris 2015 HERNANDO to LAD PTCA jailed diag 2    HTN (hypertension)    S/P PTCA (percutaneous transluminal coronary angioplasty)    Urinary retention    Hematuria    Acute blood loss anemia    Renal mass    Renal cell carcinoma of right kidney (HCC)    Iron deficiency anemia due to chronic blood loss    Tibia/fibula fracture    Tibia/fibula fracture, right, closed, with routine healing, subsequent encounter    Ischemic cardiomyopathy, prior to stenting 45%, resolved. Cardiac Testing: I have reviewed the findings below.   EKG:  ECHO:   STRESS TEST:  CATH:  BYPASS:  VASCULAR:    Past Medical History:   has a past medical history of CAD (coronary artery disease), Cancer ([address] Chest pain, Family history of early CAD, Hyperlipidemia, Hypertension, and Knee pain. Surgical History:   has a past surgical history that includes  section; Percutaneous Transluminal Coronary Angio (2015); eye surgery; Coronary angioplasty with stent (2015); Cardiac surgery; and total nephrectomy (Right, 16). Social History:   reports that she has never smoked. She has never used smokeless tobacco. She reports current alcohol use. She reports that she does not use drugs. Family History:  No evidence for sudden cardiac death or premature CAD    Medications:  Reviewed and are listed in nursing record. and/or listed below  Outpatient Medications:  Prior to Admission medications    Medication Sig Start Date End Date Taking? Authorizing Provider   lisinopril (PRINIVIL;ZESTRIL) 2.5 MG tablet TAKE ONE TABLET BY MOUTH DAILY 10/26/20  Yes Tariq Barros MD   atorvastatin (LIPITOR) 40 MG tablet TAKE 1 TABLET BY MOUTH ONE TIME A DAY 10/26/20  Yes Tariq Barros MD   metoprolol succinate (TOPROL XL) 25 MG extended release tablet TAKE 1 TABLET BY MOUTH EVERY DAY 10/26/20  Yes Tariq Barros MD   aspirin 81 MG tablet Take 81 mg by mouth daily   Yes Historical Provider, MD   nitroGLYCERIN (NITROSTAT) 0.4 MG SL tablet Place 1 tablet under the tongue every 5 minutes as needed for Chest pain 10/22/18  Yes Tariq Barros MD   Coenzyme Q10 (CO Q 10 PO) Take by mouth   Yes Historical Provider, MD       In-patient schedule medications:        Infusion Medications: Allergies:  Neomycin-bacitracin zn-polymyx; Neosporin [bacitracin-neomycin-polymyxin]; and Neomycin     Review of Systems:   All 14 point review of symptoms completed. Pertinent positives identified in the HPI, all other review of symptoms findings as below.      Review of Systems - History obtained from the patient  General ROS: negative for - chills, fever or night sweats  Psychological ROS: negative for - disorientation or hallucinations  Ophthalmic ROS: negative for - dry eyes, eye pain or loss of vision  ENT ROS: negative for - nasal discharge or sore throat  Allergy and Immunology ROS: negative for - hives or itchy/watery eyes  Hematological and Lymphatic ROS: negative for - jaundice or night sweats  Endocrine ROS: negative for - mood swings or temperature intolerance  Breast ROS: deferred  Respiratory ROS: negative for - hemoptysis or stridor  Cardiovascular ROS: negative for - chest pain, dyspnea on exertion or palpitations  Gastrointestinal ROS: no abdominal pain, change in bowel habits, or black or bloody stools  Genito-Urinary ROS: no dysuria, trouble voiding, or hematuria  Musculoskeletal ROS: negative for - gait disturbance, joint pain or joint stiffness  Neurological ROS: negative for - seizures or speech problems  Dermatological ROS: negative for - rash or skin lesion changes      Physical Examination:    /70   Pulse 81   Ht 5' 5\" (1.651 m)   Wt 150 lb 8 oz (68.3 kg)   SpO2 96%   BMI 25.04 kg/m²   /70   Pulse 81   Ht 5' 5\" (1.651 m)   Wt 150 lb 8 oz (68.3 kg)   SpO2 96%   BMI 25.04 kg/m²    Weight: 150 lb 8 oz (68.3 kg)     Wt Readings from Last 3 Encounters:   10/26/20 150 lb 8 oz (68.3 kg)   01/13/20 149 lb 14.6 oz (68 kg)   11/18/19 149 lb 14.6 oz (68 kg)     No intake or output data in the 24 hours ending 10/26/20 1336    General Appearance:  Alert, cooperative, no distress, appears stated age   Head:  Normocephalic, without obvious abnormality, atraumatic   Eyes:  PERRL, conjunctiva/corneas clear       Nose: Nares normal, no drainage or sinus tenderness   Throat: Lips, mucosa, and tongue normal   Neck: Supple, symmetrical, trachea midline, no adenopathy, thyroid: not enlarged, symmetric, no tenderness/mass/nodules, no carotid bruit or JVD       Lungs:   Clear to auscultation bilaterally, respirations unlabored   Chest Wall:  No tenderness or deformity   Heart:  Regular rhythm and normal rate; S1, S2 are normal; no murmur noted; no rub or gallop Abdomen:   Soft, non-tender, bowel sounds active all four quadrants,  no masses, no organomegaly           Extremities: Extremities normal, atraumatic, no cyanosis or edema   Pulses: 2+ and symmetric   Skin: Skin color, texture, turgor normal, no rashes or lesions   Pysch: Normal mood and affect   Neurologic: Normal gross motor and sensory exam.         Labs  No results for input(s): WBC, HGB, HCT, MCV, PLT in the last 72 hours. No results for input(s): CREATININE, BUN, NA, K, CL, CO2 in the last 72 hours. No results for input(s): INR, PROTIME in the last 72 hours. No results for input(s): TROPONINI in the last 72 hours. Invalid input(s): PRO-BNP  No results for input(s): CHOL, HDL in the last 72 hours. Invalid input(s): LDL, TG      Imaging:  I have reviewed the below testing personally and my interpretation is below. EKG:  CXR:      Assessment:  78 y.o. patient with:  Problem List Items Addressed This Visit     HLD (hyperlipidemia)    Relevant Medications    lisinopril (PRINIVIL;ZESTRIL) 2.5 MG tablet    atorvastatin (LIPITOR) 40 MG tablet    metoprolol succinate (TOPROL XL) 25 MG extended release tablet    Coronary artery disease involving native coronary artery of native heart without angina pectoris 6/2015 HERNANDO to LAD PTCA jailed diag 2 - Primary    Relevant Medications    lisinopril (PRINIVIL;ZESTRIL) 2.5 MG tablet    atorvastatin (LIPITOR) 40 MG tablet    metoprolol succinate (TOPROL XL) 25 MG extended release tablet    HTN (hypertension)    Ischemic cardiomyopathy, prior to stenting 45%, resolved. Plan:  1. I recommend that the patient continue their currently prescribed medications. Their drug modifiable risk factors appear to be well controlled. I will continue to address the need/dosing of medications in future visits. ~HTN and LIPIDS are stable  2. The patient was seen for 25 minutes.  >50% of the time was devoted to giving the patient detailed instructions instructions on addressing diet, regular exercise, weight control, smoking abstention, medication compliance, and stress minimization. The patient was provided written and verbal instructions regarding risk factor modification. 3. Follow up with me in 1 year. This note was scribed in the presence of Dr. Nilda Duran MD by Tiffany Quintanilla RN.      I, Dr. Nilda Duran, personally performed the services described in this documentation, as scribed by the above signed scribe in my presence. It is both accurate and complete to my knowledge. I agree with the details independently gathered by the clinical support staff, while the remaining scribed note accurately describes my personal service to the patient. All questions and concerns were addressed to the patient/family. Alternatives to my treatment were discussed. The note was completed using EMR. Every effort was made to ensure accuracy; however, inadvertent computerized transcription errors may be present.     Nilda Duran MD, [address], [facility]  [phone] [place] office  [phone] [facility]  10/26/2020  1:36 PM

## 2021-03-05 DIAGNOSIS — I10 ESSENTIAL (PRIMARY) HYPERTENSION: ICD-10-CM

## 2021-03-05 RX ORDER — METOPROLOL SUCCINATE 25 MG/1
TABLET, EXTENDED RELEASE ORAL
Qty: 90 TABLET | Refills: 2 | Status: SHIPPED | OUTPATIENT
Start: 2021-03-05 | End: 2021-05-26

## 2021-03-08 ENCOUNTER — HOSPITAL ENCOUNTER (OUTPATIENT)
Dept: GENERAL RADIOLOGY | Age: 71
Discharge: HOME | End: 2021-03-08
Payer: MEDICARE

## 2021-03-08 DIAGNOSIS — M25.562 LEFT KNEE PAIN, UNSPECIFIED CHRONICITY: ICD-10-CM

## 2021-03-08 DIAGNOSIS — M25.562 ACUTE PAIN OF LEFT KNEE: Primary | ICD-10-CM

## 2021-03-08 PROCEDURE — 73562 X-RAY EXAM OF KNEE 3: CPT

## 2021-03-08 RX ORDER — TRAMADOL HYDROCHLORIDE 50 MG/1
50 TABLET ORAL EVERY 6 HOURS PRN
Qty: 28 TABLET | Refills: 0 | Status: SHIPPED | OUTPATIENT
Start: 2021-03-08 | End: 2021-03-15

## 2021-03-10 ENCOUNTER — HOSPITAL ENCOUNTER (OUTPATIENT)
Dept: MRI IMAGING | Age: 71
Discharge: HOME | End: 2021-03-10
Payer: MEDICARE

## 2021-03-10 DIAGNOSIS — M25.562 LEFT KNEE PAIN, UNSPECIFIED CHRONICITY: ICD-10-CM

## 2021-03-10 PROCEDURE — 73721 MRI JNT OF LWR EXTRE W/O DYE: CPT

## 2021-03-22 RX ORDER — ATORVASTATIN CALCIUM 40 MG/1
TABLET, FILM COATED ORAL
Qty: 90 TABLET | Refills: 2 | Status: SHIPPED | OUTPATIENT
Start: 2021-03-22

## 2021-05-26 ENCOUNTER — ANESTHESIA EVENT (OUTPATIENT)
Dept: OPERATING ROOM | Age: 71
DRG: 481 | End: 2021-05-26
Payer: MEDICARE

## 2021-05-26 ENCOUNTER — APPOINTMENT (OUTPATIENT)
Dept: GENERAL RADIOLOGY | Age: 71
DRG: 481 | End: 2021-05-26
Payer: MEDICARE

## 2021-05-26 ENCOUNTER — HOSPITAL ENCOUNTER (INPATIENT)
Age: 71
LOS: 4 days | Discharge: NURSING, REHAB OR LONG-TERM CARE | DRG: 481 | End: 2021-05-30
Attending: EMERGENCY MEDICINE | Admitting: INTERNAL MEDICINE
Payer: MEDICARE

## 2021-05-26 DIAGNOSIS — S72.001A CLOSED FRACTURE OF RIGHT HIP, INITIAL ENCOUNTER (HCC): Primary | ICD-10-CM

## 2021-05-26 PROBLEM — S72.009A HIP FRACTURE (HCC): Status: ACTIVE | Noted: 2021-05-26

## 2021-05-26 PROBLEM — D64.9 ANEMIA: Status: ACTIVE | Noted: 2021-05-26

## 2021-05-26 LAB
ANION GAP SERPL CALCULATED.3IONS-SCNC: 8 MMOL/L (ref 3–16)
BASOPHILS ABSOLUTE: 0 K/UL (ref 0–0.2)
BASOPHILS RELATIVE PERCENT: 0.7 %
BUN BLDV-MCNC: 21 MG/DL (ref 7–20)
CALCIUM SERPL-MCNC: 8.7 MG/DL (ref 8.3–10.6)
CHLORIDE BLD-SCNC: 105 MMOL/L (ref 99–110)
CO2: 25 MMOL/L (ref 21–32)
CREAT SERPL-MCNC: 0.8 MG/DL (ref 0.6–1.2)
EOSINOPHILS ABSOLUTE: 0.1 K/UL (ref 0–0.6)
EOSINOPHILS RELATIVE PERCENT: 2 %
GFR AFRICAN AMERICAN: >60
GFR NON-AFRICAN AMERICAN: >60
GLUCOSE BLD-MCNC: 119 MG/DL (ref 70–99)
HCT VFR BLD CALC: 29.8 % (ref 36–48)
HEMOGLOBIN: 10.1 G/DL (ref 12–16)
LYMPHOCYTES ABSOLUTE: 1.1 K/UL (ref 1–5.1)
LYMPHOCYTES RELATIVE PERCENT: 32.1 %
MCH RBC QN AUTO: 31.3 PG (ref 26–34)
MCHC RBC AUTO-ENTMCNC: 33.8 G/DL (ref 31–36)
MCV RBC AUTO: 92.6 FL (ref 80–100)
MONOCYTES ABSOLUTE: 0.3 K/UL (ref 0–1.3)
MONOCYTES RELATIVE PERCENT: 8.9 %
NEUTROPHILS ABSOLUTE: 1.9 K/UL (ref 1.7–7.7)
NEUTROPHILS RELATIVE PERCENT: 56.3 %
PDW BLD-RTO: 15 % (ref 12.4–15.4)
PLATELET # BLD: 157 K/UL (ref 135–450)
PMV BLD AUTO: 8.2 FL (ref 5–10.5)
POTASSIUM REFLEX MAGNESIUM: 3.7 MMOL/L (ref 3.5–5.1)
RBC # BLD: 3.22 M/UL (ref 4–5.2)
SODIUM BLD-SCNC: 138 MMOL/L (ref 136–145)
WBC # BLD: 3.3 K/UL (ref 4–11)

## 2021-05-26 PROCEDURE — 6370000000 HC RX 637 (ALT 250 FOR IP): Performed by: NURSE PRACTITIONER

## 2021-05-26 PROCEDURE — 1200000000 HC SEMI PRIVATE

## 2021-05-26 PROCEDURE — 80048 BASIC METABOLIC PNL TOTAL CA: CPT

## 2021-05-26 PROCEDURE — 73502 X-RAY EXAM HIP UNI 2-3 VIEWS: CPT

## 2021-05-26 PROCEDURE — 96374 THER/PROPH/DIAG INJ IV PUSH: CPT

## 2021-05-26 PROCEDURE — 2580000003 HC RX 258: Performed by: INTERNAL MEDICINE

## 2021-05-26 PROCEDURE — 93005 ELECTROCARDIOGRAM TRACING: CPT | Performed by: PHYSICIAN ASSISTANT

## 2021-05-26 PROCEDURE — 71045 X-RAY EXAM CHEST 1 VIEW: CPT

## 2021-05-26 PROCEDURE — 6360000002 HC RX W HCPCS: Performed by: PHYSICIAN ASSISTANT

## 2021-05-26 PROCEDURE — 6370000000 HC RX 637 (ALT 250 FOR IP): Performed by: INTERNAL MEDICINE

## 2021-05-26 PROCEDURE — 99285 EMERGENCY DEPT VISIT HI MDM: CPT

## 2021-05-26 PROCEDURE — 6360000002 HC RX W HCPCS: Performed by: INTERNAL MEDICINE

## 2021-05-26 PROCEDURE — 85025 COMPLETE CBC W/AUTO DIFF WBC: CPT

## 2021-05-26 RX ORDER — PRAVASTATIN SODIUM 20 MG
TABLET ORAL
COMMUNITY
End: 2021-05-26

## 2021-05-26 RX ORDER — MORPHINE SULFATE 4 MG/ML
4 INJECTION, SOLUTION INTRAMUSCULAR; INTRAVENOUS ONCE
Status: COMPLETED | OUTPATIENT
Start: 2021-05-26 | End: 2021-05-26

## 2021-05-26 RX ORDER — ATORVASTATIN CALCIUM 40 MG/1
40 TABLET, FILM COATED ORAL DAILY
Status: DISCONTINUED | OUTPATIENT
Start: 2021-05-27 | End: 2021-05-30 | Stop reason: HOSPADM

## 2021-05-26 RX ORDER — ASPIRIN 81 MG/1
81 TABLET, CHEWABLE ORAL DAILY
COMMUNITY

## 2021-05-26 RX ORDER — ASPIRIN 81 MG/1
81 TABLET, CHEWABLE ORAL DAILY
Status: DISCONTINUED | OUTPATIENT
Start: 2021-05-27 | End: 2021-05-30 | Stop reason: HOSPADM

## 2021-05-26 RX ORDER — METOPROLOL SUCCINATE 25 MG/1
25 TABLET, EXTENDED RELEASE ORAL DAILY
Status: DISCONTINUED | OUTPATIENT
Start: 2021-05-27 | End: 2021-05-30 | Stop reason: HOSPADM

## 2021-05-26 RX ORDER — NITROGLYCERIN 0.4 MG/1
0.4 TABLET SUBLINGUAL EVERY 5 MIN PRN
Status: DISCONTINUED | OUTPATIENT
Start: 2021-05-26 | End: 2021-05-30 | Stop reason: HOSPADM

## 2021-05-26 RX ORDER — SODIUM CHLORIDE 0.9 % (FLUSH) 0.9 %
5-40 SYRINGE (ML) INJECTION PRN
Status: DISCONTINUED | OUTPATIENT
Start: 2021-05-26 | End: 2021-05-30 | Stop reason: HOSPADM

## 2021-05-26 RX ORDER — ACETAMINOPHEN 325 MG/1
650 TABLET ORAL EVERY 6 HOURS PRN
Status: DISCONTINUED | OUTPATIENT
Start: 2021-05-26 | End: 2021-05-30 | Stop reason: HOSPADM

## 2021-05-26 RX ORDER — MORPHINE SULFATE 2 MG/ML
2 INJECTION, SOLUTION INTRAMUSCULAR; INTRAVENOUS EVERY 4 HOURS PRN
Status: DISCONTINUED | OUTPATIENT
Start: 2021-05-26 | End: 2021-05-30 | Stop reason: HOSPADM

## 2021-05-26 RX ORDER — SODIUM CHLORIDE 0.9 % (FLUSH) 0.9 %
5-40 SYRINGE (ML) INJECTION EVERY 12 HOURS SCHEDULED
Status: DISCONTINUED | OUTPATIENT
Start: 2021-05-26 | End: 2021-05-30 | Stop reason: HOSPADM

## 2021-05-26 RX ORDER — MORPHINE SULFATE 4 MG/ML
4 INJECTION, SOLUTION INTRAMUSCULAR; INTRAVENOUS EVERY 4 HOURS PRN
Status: DISCONTINUED | OUTPATIENT
Start: 2021-05-26 | End: 2021-05-30 | Stop reason: HOSPADM

## 2021-05-26 RX ORDER — METOPROLOL SUCCINATE 25 MG/1
25 TABLET, EXTENDED RELEASE ORAL DAILY
COMMUNITY
Start: 2021-04-08 | End: 2021-11-02 | Stop reason: SDUPTHER

## 2021-05-26 RX ORDER — LISINOPRIL 2.5 MG/1
2.5 TABLET ORAL DAILY
Status: DISCONTINUED | OUTPATIENT
Start: 2021-05-27 | End: 2021-05-30 | Stop reason: HOSPADM

## 2021-05-26 RX ORDER — SODIUM CHLORIDE 9 MG/ML
25 INJECTION, SOLUTION INTRAVENOUS PRN
Status: DISCONTINUED | OUTPATIENT
Start: 2021-05-26 | End: 2021-05-30 | Stop reason: HOSPADM

## 2021-05-26 RX ORDER — ATORVASTATIN CALCIUM 40 MG/1
40 TABLET, FILM COATED ORAL NIGHTLY
Status: ON HOLD | COMMUNITY
Start: 2021-04-08 | End: 2021-05-30 | Stop reason: HOSPADM

## 2021-05-26 RX ORDER — LISINOPRIL 2.5 MG/1
2.5 TABLET ORAL DAILY
COMMUNITY
Start: 2021-04-08 | End: 2021-11-02 | Stop reason: SDUPTHER

## 2021-05-26 RX ORDER — PROMETHAZINE HYDROCHLORIDE 25 MG/1
12.5 TABLET ORAL EVERY 6 HOURS PRN
Status: DISCONTINUED | OUTPATIENT
Start: 2021-05-26 | End: 2021-05-30 | Stop reason: HOSPADM

## 2021-05-26 RX ORDER — LORAZEPAM 2 MG/ML
1 INJECTION INTRAMUSCULAR EVERY 6 HOURS PRN
Status: DISCONTINUED | OUTPATIENT
Start: 2021-05-26 | End: 2021-05-30 | Stop reason: HOSPADM

## 2021-05-26 RX ORDER — POLYETHYLENE GLYCOL 3350 17 G/17G
17 POWDER, FOR SOLUTION ORAL DAILY PRN
Status: DISCONTINUED | OUTPATIENT
Start: 2021-05-26 | End: 2021-05-30 | Stop reason: HOSPADM

## 2021-05-26 RX ORDER — OXYCODONE HYDROCHLORIDE AND ACETAMINOPHEN 5; 325 MG/1; MG/1
2 TABLET ORAL EVERY 4 HOURS PRN
Status: DISCONTINUED | OUTPATIENT
Start: 2021-05-26 | End: 2021-05-30 | Stop reason: HOSPADM

## 2021-05-26 RX ORDER — ONDANSETRON 2 MG/ML
4 INJECTION INTRAMUSCULAR; INTRAVENOUS EVERY 6 HOURS PRN
Status: DISCONTINUED | OUTPATIENT
Start: 2021-05-26 | End: 2021-05-30 | Stop reason: HOSPADM

## 2021-05-26 RX ORDER — ACETAMINOPHEN 650 MG/1
650 SUPPOSITORY RECTAL EVERY 6 HOURS PRN
Status: DISCONTINUED | OUTPATIENT
Start: 2021-05-26 | End: 2021-05-30 | Stop reason: HOSPADM

## 2021-05-26 RX ORDER — OXYCODONE HYDROCHLORIDE AND ACETAMINOPHEN 5; 325 MG/1; MG/1
1 TABLET ORAL EVERY 4 HOURS PRN
Status: DISCONTINUED | OUTPATIENT
Start: 2021-05-26 | End: 2021-05-30 | Stop reason: HOSPADM

## 2021-05-26 RX ORDER — CYCLOBENZAPRINE HCL 10 MG
10 TABLET ORAL 3 TIMES DAILY PRN
Status: DISCONTINUED | OUTPATIENT
Start: 2021-05-26 | End: 2021-05-30 | Stop reason: HOSPADM

## 2021-05-26 RX ADMIN — OXYCODONE HYDROCHLORIDE AND ACETAMINOPHEN 2 TABLET: 5; 325 TABLET ORAL at 18:57

## 2021-05-26 RX ADMIN — CYCLOBENZAPRINE HYDROCHLORIDE 10 MG: 10 TABLET, FILM COATED ORAL at 22:04

## 2021-05-26 RX ADMIN — SODIUM CHLORIDE, PRESERVATIVE FREE 10 ML: 5 INJECTION INTRAVENOUS at 22:27

## 2021-05-26 RX ADMIN — MORPHINE SULFATE 4 MG: 4 INJECTION, SOLUTION INTRAMUSCULAR; INTRAVENOUS at 21:35

## 2021-05-26 RX ADMIN — PROMETHAZINE HYDROCHLORIDE 12.5 MG: 25 TABLET ORAL at 18:57

## 2021-05-26 RX ADMIN — MORPHINE SULFATE 4 MG: 4 INJECTION, SOLUTION INTRAMUSCULAR; INTRAVENOUS at 16:30

## 2021-05-26 RX ADMIN — POLYETHYLENE GLYCOL 3350 17 G: 17 POWDER, FOR SOLUTION ORAL at 19:03

## 2021-05-26 ASSESSMENT — PAIN DESCRIPTION - PAIN TYPE
TYPE: ACUTE PAIN
TYPE: ACUTE PAIN

## 2021-05-26 ASSESSMENT — PAIN SCALES - GENERAL
PAINLEVEL_OUTOF10: 2
PAINLEVEL_OUTOF10: 6
PAINLEVEL_OUTOF10: 5
PAINLEVEL_OUTOF10: 9
PAINLEVEL_OUTOF10: 9
PAINLEVEL_OUTOF10: 2

## 2021-05-26 ASSESSMENT — PAIN DESCRIPTION - LOCATION
LOCATION: HIP
LOCATION: HIP

## 2021-05-26 ASSESSMENT — PAIN DESCRIPTION - ORIENTATION
ORIENTATION: RIGHT
ORIENTATION: RIGHT

## 2021-05-26 ASSESSMENT — PAIN DESCRIPTION - DESCRIPTORS: DESCRIPTORS: SHARP

## 2021-05-26 ASSESSMENT — ENCOUNTER SYMPTOMS
ABDOMINAL PAIN: 0
EYES NEGATIVE: 1
SHORTNESS OF BREATH: 0

## 2021-05-26 NOTE — ED NOTES
Bed: 02  Expected date: 5/26/21  Expected time: 3:10 PM  Means of arrival: [facility] SILVINA HAAS  Comments:   [address], RN  05/26/21 8234

## 2021-05-26 NOTE — PROGRESS NOTES
Writer spoke to Dr. Ella Thomas regarding surgery tomorrow. And, writer updated patient and daughter at this time.

## 2021-05-26 NOTE — PROGRESS NOTES
Writer spoke with Dr. Arias Walker regarding Ortho Consult. Patient will be NPO at midnight for surgery tomorrow.

## 2021-05-26 NOTE — H&P
Place 1 tablet under the tongue every 5 minutes as needed for Chest pain 10/22/18  Yes Kanu Raphael MD   Coenzyme Q10 (CO Q 10 PO) Take by mouth   Yes Historical Provider, MD       Allergies:  Neomycin-bacitracin zn-polymyx, Neosporin [bacitracin-neomycin-polymyxin], and Neomycin    Social History:      The patient currently lives independently    TOBACCO:   reports that she has never smoked. She has never used smokeless tobacco.  ETOH:   reports current alcohol use. Family History:      Reviewed in detail and negative for Cancer, CVA. Positive as follows:        Problem Relation Age of Onset    Diabetes Father     Heart Disease Father     High Blood Pressure Father     Heart Disease Paternal Grandfather        REVIEW OF SYSTEMS:   Pertinent positives as noted in the HPI. All other systems reviewed and negative. PHYSICAL EXAM:    BP (!) 95/54   Pulse 69   Temp 97.6 °F (36.4 °C) (Oral)   Resp 18   Ht 5' 5\" (1.651 m)   Wt 145 lb (65.8 kg)   SpO2 98%   BMI 24.13 kg/m²     General appearance:  No apparent distress, appears stated age and cooperative. HEENT:  Normal cephalic, atraumatic without obvious deformity. Pupils equal, round, and reactive to light. Extra ocular muscles intact. Conjunctivae/corneas clear. Neck: Supple, with full range of motion. No jugular venous distention. Trachea midline. Respiratory:  Normal respiratory effort. Clear to auscultation, bilaterally without Rales/Wheezes/Rhonchi. Cardiovascular:  Regular rate and rhythm with normal S1/S2 without murmurs, rubs or gallops. Abdomen: Soft, non-tender, non-distended with normal bowel sounds. Musculoskeletal:  No clubbing, cyanosis or edema bilaterally. Full range of motion without deformity. Skin: Skin color, texture, turgor normal.  No rashes or lesions. Neurologic:  Neurovascularly intact without any focal sensory/motor deficits.  Cranial nerves: II-XII intact, grossly non-focal.  Psychiatric:  Alert and oriented, thought content appropriate, normal insight  Capillary Refill: Brisk,< 3 seconds   Peripheral Pulses: +2 palpable, equal bilaterally       CXR:  I have reviewed the CXR with the following interpretation: No acute ASD  EKG:  I have reviewed the EKG with the following interpretation: NSR w/out acute ischemic changes. Labs:     Recent Labs     05/26/21  1559   WBC 3.3*   HGB 10.1*   HCT 29.8*        Recent Labs     05/26/21  1559      K 3.7      CO2 25   BUN 21*   CREATININE 0.8   CALCIUM 8.7     No results for input(s): AST, ALT, BILIDIR, BILITOT, ALKPHOS in the last 72 hours. No results for input(s): INR in the last 72 hours. No results for input(s): [place] Thomas in the last 72 hours. Urinalysis:      Lab Results   Component Value Date    NITRU see below 05/08/2016    WBCUA see below 05/08/2016    BACTERIA see below 05/08/2016    RBCUA >100 05/08/2016    BLOODU LARGE 05/08/2016    SPECGRAV 1.015 05/08/2016    GLUCOSEU Negative 05/08/2016         ASSESSMENT:    Active Hospital Problems    Diagnosis Date Noted    Hip fracture ([facility] [address] Javier Luis 05/26/2021    Anemia [D64.9] 05/26/2021    Hip fracture, right, closed, initial encounter ([facility] [address] [place] 05/26/2021    HTN (hypertension) [I10] 06/23/2015    CAD (coronary artery disease) [I25.10] 06/23/2015    HLD (hyperlipidemia) [E78.5] 11/05/2012       PLAN:      R Hip Fracture - Acute fx 2nd to mechanical fall. Likely a pathologic osteoporotic fracture sustained in diseased bone w/ decreased mechanical resistance to a 'normal' mechanical load from a fall that wouldn't break normal healthy bone. Ortho consulted from ED and appreciated in advance. OK to proceed to OR w/out further cardiopulmonary evaluation w/out signs/sxs of active ischemia/failure. Continue PO/IV Narcotic analgesia as ordered. Anemia - etiology clinically unable to determine, w/out evidence of active bleeding/hemolysis.  Asymptomatic w/out indication for transfusion. Follow serial labs. Reviewed and documented as above. HTN/CAD - w/ known CAD but no evidence of active signs/sxs of ischemia/failure. Currently controlled on home meds w/ vitals reviewed and documented as above. HyperLipidemia - controlled on home Statin. Continue, w/ f/u and med adjustment w/ PCP        DVT Prophylaxis: IPC  Diet: General, NPO after Mn  Code Status: Full Code      PT/OT Eval Status: not yet ordered. Dispo - Likely Thurs 28 May at the earliest pending post-op course and Ortho recs. Vita Kirby MD    Thank you Maylon Goldberg, MD for the opportunity to be involved in this patient's care. If you have any questions or concerns please feel free to contact me at [phone].

## 2021-05-26 NOTE — PLAN OF CARE
Contacted for a consult on a 79 y/o who had a mechanical fall and landed on the right side. Unable to get up or walk after. Plain films revealed a right side intertrochanteric hip fracture. Patient to be admitted to hospitalist service and appreciate help in optimization and clearance for surgery. Plan for OR tomorrow afternoon with Dr Twyla Calles for IM nail of the right hip. NPO after MN. Consent to be obtained. Continue pain control. Will see in the morning and full consult to follow. Juan Alberto Reyes PA-C      Addendum:    Was notified by the floor that patient has requested [place] for surgical fixation. I will remove us from the care team and nurse is going to contact [facility] for further care and planning.      Juan Alberto Reyes PA-C

## 2021-05-26 NOTE — ED NOTES

## 2021-05-26 NOTE — PROGRESS NOTES
Patients daughter at bedside. Patient refused skin assessment and turns duet to pain with any movement.

## 2021-05-26 NOTE — ED PROVIDER NOTES
I independently performed a history and physical on Mariana Castillo. All diagnostic, treatment, and disposition decisions were made by myself in conjunction with the advanced practice provider. For further details of [facility] emergency department encounter, please see NAYANA Cheung's documentation. Patient reports she had a mechanical fall landed on her right hip. She was unable to get up because of severe pain. She denies any head injury or loss of consciousness or any other injuries from the fall. On exam she is tender over the right greater trochanter and is neurovascularly intact to the toes of the right foot. EKG  The Ekg interpreted by me shows  normal sinus rhythm with a rate of 69  Axis is   Normal  QTc is  normal  Intervals and Durations are unremarkable. ST Segments: normal  No significant change from prior EKG dated 1/23/2017      XR HIP RIGHT (2-3 VIEWS)    Result Date: 5/26/2021  EXAMINATION: TWO XRAY VIEWS OF THE RIGHT HIP 5/26/2021 3:37 pm COMPARISON: None. HISTORY: ORDERING SYSTEM PROVIDED HISTORY: pain s/p fall TECHNOLOGIST PROVIDED HISTORY: Reason for exam:->pain s/p fall Reason for Exam: Fall (Pt states she was trying to get a seat belt unstuck [facility] [employer], fell backwards out of [facility] now has R hip pain. Pt given 100 mcg fentanyl en route by EMS) FINDINGS: Intertrochanteric fracture right femoral neck. No dislocation. No other fracture. The left hip is in anatomic alignment. Intertrochanteric fracture right femoral neck     XR CHEST PORTABLE    Result Date: 5/26/2021  EXAMINATION: ONE XRAY VIEW OF THE CHEST 5/26/2021 3:37 pm COMPARISON: Chest x-ray 06/21/2015. HISTORY: ORDERING SYSTEM PROVIDED HISTORY: fall TECHNOLOGIST PROVIDED HISTORY: Reason for exam:->fall Reason for Exam: Fall (Pt states she was trying to get a seat belt unstuck from her [facility], fell backwards out of [facility] now has R hip pain.  Pt given 100 mcg fentanyl en route by EMS) FINDINGS: Cardiomediastinal

## 2021-05-26 NOTE — ED PROVIDER NOTES
[facility]  ED  EMERGENCY DEPARTMENT ENCOUNTER        Pt Name: Marilou Smith  MRN: [id number]  [place] 1950  Date of evaluation: 5/26/2021  Provider: Sagar Forte PA-C  PCP: Yolette Sierra MD  ED Attending: Sharif Peralta MD      This patient was seen by the attending provider      History provided by the patient    CHIEF COMPLAINT:     Chief Complaint   Patient presents with    Fall     Pt states she was trying to get a seat belt unstuck from her [employer], fell backwards out of [employer] now has R hip pain. Pt given 100 mcg fentanyl en route by EMS    Hip Pain       HISTORY OF PRESENT ILLNESS:      Marilou Smith is a 78 y.o. female who arrives to the ED by [facility]. Patient suffered a fall in the parking lot of [employer] just prior to arrival.  She was helping her currently injured  get out of their [employer] from his wheelchair onto a ramp. Patient reports in trying to get him out she fell and landed directly on her right hip. Patient was unable to get up following the fall nor was she able to range her right hip. Bystanders saw this happened and called EMS. Patient received fentanyl 100 mcg in route to the hospital and when lying still on the bed, has mild pain. With any movement, pain becomes more moderate to severe. Patient denies any other injuries. Specifically she did not hit her head. She denies any neck or back pain. She takes a baby aspirin but denies other anticoagulation. Nursing Notes were reviewed     REVIEW OF SYSTEMS:     Review of Systems   Constitutional: Negative for appetite change, chills and fever. HENT: Negative. Eyes: Negative. Respiratory: Negative for shortness of breath. Cardiovascular: Negative for chest pain. Gastrointestinal: Negative for abdominal pain. Genitourinary: Negative. Musculoskeletal: Positive for arthralgias (right hip) and gait problem. Negative for neck pain. Skin: Negative for wound.    Neurological: Negative for dizziness, weakness, numbness and headaches. All other systems reviewed and are negative. Except as noted above in the ROS, all other systems were reviewed and negative.          PAST MEDICAL HISTORY:     Past Medical History:   Diagnosis Date    CAD (coronary artery disease)     Cancer ([facility] [address]     kidney cancer    Chest pain 2015    + Troponins/ Abn Stress Test/ PTCA    Family history of early CAD     Hyperlipidemia     Hypertension     Knee pain     right         SURGICAL HISTORY:      Past Surgical History:   Procedure Laterality Date    CARDIAC SURGERY       SECTION      CORONARY ANGIOPLASTY WITH STENT PLACEMENT  2015    EYE SURGERY      PTCA  2015    95% occlusion- pLAD, HERNANDO 2.5x 16    TOTAL NEPHRECTOMY Right 16         CURRENT MEDICATIONS:       Previous Medications    ASPIRIN 81 MG CHEWABLE TABLET    Take 81 mg by mouth daily    ATORVASTATIN (LIPITOR) 40 MG TABLET    TAKE 1 TABLET BY MOUTH ONE TIME A DAY     ATORVASTATIN (LIPITOR) 40 MG TABLET    Take 40 mg by mouth nightly    COENZYME Q10 (CO Q 10 PO)    Take by mouth    LISINOPRIL (PRINIVIL;ZESTRIL) 2.5 MG TABLET    Take 2.5 mg by mouth daily    METOPROLOL SUCCINATE (TOPROL XL) 25 MG EXTENDED RELEASE TABLET    Take 25 mg by mouth daily    NITROGLYCERIN (NITROSTAT) 0.4 MG SL TABLET    Place 1 tablet under the tongue every 5 minutes as needed for Chest pain         ALLERGIES:    Neomycin-bacitracin zn-polymyx, Neosporin [bacitracin-neomycin-polymyxin], and Neomycin    FAMILY HISTORY:       Family History   Problem Relation Age of Onset    Diabetes Father     Heart Disease Father     High Blood Pressure Father     Heart Disease Paternal Grandfather           SOCIAL HISTORY:       Social History     Socioeconomic History    Marital status:      Spouse name: None    Number of children: 2    Years of education: None    Highest education level: None   Occupational History    None   Tobacco Use    Smoking status: Never Smoker    Smokeless tobacco: Never Used   Vaping Use    Vaping Use: Never used   Substance and Sexual Activity    Alcohol use: Yes     Comment: Occasionally    Drug use: No    Sexual activity: Yes     Partners: Male   Other Topics Concern    None   Social History Narrative    None     Social Determinants of Health     Financial Resource Strain:     Difficulty of Paying Living Expenses:    Food Insecurity:     Worried About Running Out of Food in the Last Year:     Ran Out of Food in the Last Year:    Transportation Needs:     Lack of Transportation (Medical):  Lack of Transportation (Non-Medical):    Physical Activity:     Days of Exercise per Week:     Minutes of Exercise per Session:    Stress:     Feeling of Stress :    Social Connections:     Frequency of Communication with Friends and Family:     Frequency of Social Gatherings with Friends and Family:     Attends Jew Services:     Active Member of Clubs or Organizations:     Attends Club or Organization Meetings:     Marital Status:    Intimate Partner Violence:     Fear of Current or Ex-Partner:     Emotionally Abused:     Physically Abused:     Sexually Abused:        SCREENINGS:             PHYSICAL EXAM:       ED Triage Vitals [05/26/21 1526]   BP Temp Temp Source Pulse Resp SpO2 Height Weight   123/64 97.6 °F (36.4 °C) Oral 68 14 100 % 5' 5\" (1.651 m) 145 lb (65.8 kg)       Physical Exam    CONSTITUTIONAL: Awake and alert. Cooperative. Well-developed. Well-nourished. Non-toxic. No acute distress. HENT: Normocephalic. Atraumatic. External ears normal, without discharge. No nasal discharge. Oropharynx clear. Mucous membranes moist.  EYES: Conjunctiva non-injected. No scleral icterus. PERRL. EOM's grossly intact. NECK: Supple. Normal ROM. CARDIOVASCULAR: RRR. No Murmer. Intact distal pulses. PULMONARY/CHEST WALL: Effort normal. No tachypnea. Lungs clear to ausculation.    ABDOMEN: Normal BS. Soft. Nondistended. No tenderness to palpate. No guarding. /ANORECTAL: Not assessed  MUSKULOSKELETAL: Right lower extremity slightly shortened with external rotation. Tenderness to the right inguinal crease. No crepitus. Patient unable to range hip due to pain. No edema. Distal pulses intact and no tenderness distal over the knee, ankle or foot. SKIN: Warm and dry. No rash. NEUROLOGICAL: Alert and oriented x 3. GCS 15. CN II-XII grossly intact. Strength is 5/5 in all extremities and sensation is intact.    PSYCHIATRIC: Normal affect        DIAGNOSTICRESULTS:     LABS:    Results for orders placed or performed during the hospital encounter of 05/26/21   CBC Auto Differential   Result Value Ref Range    WBC 3.3 (L) 4.0 - 11.0 K/uL    RBC 3.22 (L) 4.00 - 5.20 M/uL    Hemoglobin 10.1 (L) 12.0 - 16.0 g/dL    Hematocrit 29.8 (L) 36.0 - 48.0 %    MCV 92.6 80.0 - 100.0 fL    MCH 31.3 26.0 - 34.0 pg    MCHC 33.8 31.0 - 36.0 g/dL    RDW 15.0 12.4 - 15.4 %    Platelets 893 754 - 519 K/uL    MPV 8.2 5.0 - 10.5 fL    Neutrophils % 56.3 %    Lymphocytes % 32.1 %    Monocytes % 8.9 %    Eosinophils % 2.0 %    Basophils % 0.7 %    Neutrophils Absolute 1.9 1.7 - 7.7 K/uL    Lymphocytes Absolute 1.1 1.0 - 5.1 K/uL    Monocytes Absolute 0.3 0.0 - 1.3 K/uL    Eosinophils Absolute 0.1 0.0 - 0.6 K/uL    Basophils Absolute 0.0 0.0 - 0.2 K/uL   Basic Metabolic Panel w/ Reflex to MG   Result Value Ref Range    Sodium 138 136 - 145 mmol/L    Potassium reflex Magnesium 3.7 3.5 - 5.1 mmol/L    Chloride 105 99 - 110 mmol/L    CO2 25 21 - 32 mmol/L    Anion Gap 8 3 - 16    Glucose 119 (H) 70 - 99 mg/dL    BUN 21 (H) 7 - 20 mg/dL    CREATININE 0.8 0.6 - 1.2 mg/dL    GFR Non-African American >60 >60    GFR African American >60 >60    Calcium 8.7 8.3 - 10.6 mg/dL   EKG 12 Lead   Result Value Ref Range    Ventricular Rate 69 BPM    Atrial Rate 69 BPM    P-R Interval 136 ms    QRS Duration 82 ms    Q-T Interval 434 ms    QTc Calculation (Bazett) 465 ms    P Axis 81 degrees    R Axis 79 degrees    T Axis 67 degrees    Diagnosis       Normal sinus rhythmNormal ECGWhen compared with ECG of 21-JUN-2015 23:18,No significant change was found         RADIOLOGY:  All x-ray studies areviewed/reviewed by me. Formal interpretations per the radiologist are as follows:      XR HIP RIGHT (2-3 VIEWS)    Result Date: 5/26/2021  EXAMINATION: TWO XRAY VIEWS OF THE RIGHT HIP 5/26/2021 3:37 pm COMPARISON: None. HISTORY: ORDERING SYSTEM PROVIDED HISTORY: pain s/p fall TECHNOLOGIST PROVIDED HISTORY: Reason for exam:->pain s/p fall Reason for Exam: Fall (Pt states she was trying to get a seat belt unstuck from her [employer], fell backwards out of [employer] now has R hip pain. Pt given 100 mcg fentanyl en route by EMS) FINDINGS: Intertrochanteric fracture right femoral neck. No dislocation. No other fracture. The left hip is in anatomic alignment. Intertrochanteric fracture right femoral neck     XR CHEST PORTABLE    Result Date: 5/26/2021  EXAMINATION: ONE XRAY VIEW OF THE CHEST 5/26/2021 3:37 pm COMPARISON: Chest x-ray 06/21/2015. HISTORY: ORDERING SYSTEM PROVIDED HISTORY: fall TECHNOLOGIST PROVIDED HISTORY: Reason for exam:->fall Reason for Exam: Fall (Pt states she was trying to get a seat belt unstuck from her [employer], fell backwards out of [employer] now has R hip pain. Pt given 100 mcg fentanyl en route by EMS) FINDINGS: Cardiomediastinal silhouette is unremarkable given rotated projection. Lungs demonstrate no focal consolidation or pleural effusion. No pneumothorax appreciated on this projection. Biapical thickening and scattered atelectasis or scarring noted. No displaced rib fracture identified on this projection. Surgical clips over the right upper quadrant. No acute process by radiograph. EKG: The Ekg interpreted by me in the absence of a cardiologist shows.     normal sinus rhythm with a rate of 69    See also interpretation by Chong Walker, MD.      PROCEDURES:   N/A    CRITICAL CARE TIME:       none    CONSULTS:  IP CONSULT TO HOSPITALIST  IP CONSULT TO ORTHOPEDIC SURGERY      EMERGENCY DEPARTMENT COURSE and DIFFERENTIAL DIAGNOSIS/MDM:   Vitals:    Vitals:    05/26/21 1526 05/26/21 1633   BP: 123/64 (!) 95/54   Pulse: 68 69   Resp: 14 18   Temp: 97.6 °F (36.4 °C)    TempSrc: Oral    SpO2: 100% 98%   Weight: 145 lb (65.8 kg)    Height: 5' 5\" (1.651 m)        Patient was given the following medications:  Medications   morphine (PF) injection 4 mg (4 mg Intravenous Given 5/26/21 1630)         Patient was evaluated by both myself and Walt Arevalo MD  Old records were reviewed. Patient suffered a mechanical fall, landing on her right hip. She arrives with severe pain and EMS did give her fentanyl in route to the hospital.  Patient hemodynamically stable and has no other complaints or injuries today. An IV was established. Patient was given morphine 4 mg IV for pain. CBC white count 3.3 with H&H 10.1 and 28.8  BMP unremarkable  X-ray right hip shows an intertrochanteric fracture of the right femoral neck  Portable chest x-ray normal  The patient's right hip fracture warrants hospitalization. I did consult orthopedics and spoke with Shelli Harkins PA-C. They will see the patient and plan on operative repair. I am consulting the hospitalist for admission. I spoke with Dr. Nicholas Meza and Shelli Harkins PA-C. We thoroughly discussed the history, physical exam, laboratory and imaging studies, as well as, emergency department course. Based upon that discussion, we've decided to admit Mary Banerjee for further observation and evaluation of Soumya Vazquez's fall and right hip fracture. As I have deemed necessary from their history, physical, and studies, I have considered and evaluated Mary Banerjee for the following diagnoses: Trauma, fracture. FINAL IMPRESSION:      1.  Closed fracture of right hip, initial encounter [facility])          DISPOSITION/PLAN: DISPOSITION     ADMIT           (Please note thatportions of this note were completed with a voice recognition program.  Efforts were made to edit the dictations, but occasionally words are mis-transcribed.)    Jihan Hills PA-C (electronicallysigned)              Sarbjit Miranda, [id number]  05/26/21 4199

## 2021-05-26 NOTE — ED NOTES
PS ortho surgery @ [facility] returned call and spoke with David KRAUS @ [facility]  05/26/21 [phone]

## 2021-05-27 ENCOUNTER — APPOINTMENT (OUTPATIENT)
Dept: GENERAL RADIOLOGY | Age: 71
DRG: 481 | End: 2021-05-27
Payer: MEDICARE

## 2021-05-27 ENCOUNTER — ANESTHESIA (OUTPATIENT)
Dept: OPERATING ROOM | Age: 71
DRG: 481 | End: 2021-05-27
Payer: MEDICARE

## 2021-05-27 VITALS
RESPIRATION RATE: 5 BRPM | DIASTOLIC BLOOD PRESSURE: 55 MMHG | SYSTOLIC BLOOD PRESSURE: 100 MMHG | OXYGEN SATURATION: 100 %

## 2021-05-27 LAB
ALBUMIN SERPL-MCNC: 3.5 G/DL (ref 3.4–5)
ANION GAP SERPL CALCULATED.3IONS-SCNC: 7 MMOL/L (ref 3–16)
BILIRUBIN URINE: NEGATIVE
BLOOD, URINE: ABNORMAL
BUN BLDV-MCNC: 21 MG/DL (ref 7–20)
CALCIUM SERPL-MCNC: 8.8 MG/DL (ref 8.3–10.6)
CHLORIDE BLD-SCNC: 101 MMOL/L (ref 99–110)
CLARITY: CLEAR
CO2: 28 MMOL/L (ref 21–32)
COLOR: YELLOW
CREAT SERPL-MCNC: 1 MG/DL (ref 0.6–1.2)
EKG ATRIAL RATE: 69 BPM
EKG DIAGNOSIS: NORMAL
EKG P AXIS: 81 DEGREES
EKG P-R INTERVAL: 136 MS
EKG Q-T INTERVAL: 434 MS
EKG QRS DURATION: 82 MS
EKG QTC CALCULATION (BAZETT): 465 MS
EKG R AXIS: 79 DEGREES
EKG T AXIS: 67 DEGREES
EKG VENTRICULAR RATE: 69 BPM
EPITHELIAL CELLS, UA: ABNORMAL /HPF (ref 0–5)
GFR AFRICAN AMERICAN: >60
GFR NON-AFRICAN AMERICAN: 55
GLUCOSE BLD-MCNC: 131 MG/DL (ref 70–99)
GLUCOSE URINE: NEGATIVE MG/DL
HCT VFR BLD CALC: 28.9 % (ref 36–48)
HEMOGLOBIN: 9.8 G/DL (ref 12–16)
KETONES, URINE: NEGATIVE MG/DL
LEUKOCYTE ESTERASE, URINE: NEGATIVE
MCH RBC QN AUTO: 31.4 PG (ref 26–34)
MCHC RBC AUTO-ENTMCNC: 33.8 G/DL (ref 31–36)
MCV RBC AUTO: 92.9 FL (ref 80–100)
MICROSCOPIC EXAMINATION: YES
MUCUS: ABNORMAL /LPF
NITRITE, URINE: NEGATIVE
PDW BLD-RTO: 15 % (ref 12.4–15.4)
PH UA: 6 (ref 5–8)
PHOSPHORUS: 3.5 MG/DL (ref 2.5–4.9)
PLATELET # BLD: 145 K/UL (ref 135–450)
PMV BLD AUTO: 8.6 FL (ref 5–10.5)
POTASSIUM SERPL-SCNC: 4.5 MMOL/L (ref 3.5–5.1)
PROTEIN UA: NEGATIVE MG/DL
RBC # BLD: 3.11 M/UL (ref 4–5.2)
RBC UA: ABNORMAL /HPF (ref 0–4)
SODIUM BLD-SCNC: 136 MMOL/L (ref 136–145)
SPECIFIC GRAVITY UA: 1.02 (ref 1–1.03)
URINE REFLEX TO CULTURE: ABNORMAL
URINE TYPE: ABNORMAL
UROBILINOGEN, URINE: 0.2 E.U./DL
WBC # BLD: 5.9 K/UL (ref 4–11)
WBC UA: ABNORMAL /HPF (ref 0–5)

## 2021-05-27 PROCEDURE — 1200000000 HC SEMI PRIVATE

## 2021-05-27 PROCEDURE — 2500000003 HC RX 250 WO HCPCS: Performed by: NURSE ANESTHETIST, CERTIFIED REGISTERED

## 2021-05-27 PROCEDURE — 6360000002 HC RX W HCPCS: Performed by: INTERNAL MEDICINE

## 2021-05-27 PROCEDURE — 81001 URINALYSIS AUTO W/SCOPE: CPT

## 2021-05-27 PROCEDURE — 2709999900 HC NON-CHARGEABLE SUPPLY: Performed by: ORTHOPAEDIC SURGERY

## 2021-05-27 PROCEDURE — 2580000003 HC RX 258: Performed by: ANESTHESIOLOGY

## 2021-05-27 PROCEDURE — 2580000003 HC RX 258: Performed by: ORTHOPAEDIC SURGERY

## 2021-05-27 PROCEDURE — 6370000000 HC RX 637 (ALT 250 FOR IP): Performed by: ORTHOPAEDIC SURGERY

## 2021-05-27 PROCEDURE — 6370000000 HC RX 637 (ALT 250 FOR IP): Performed by: INTERNAL MEDICINE

## 2021-05-27 PROCEDURE — 6360000002 HC RX W HCPCS: Performed by: ANESTHESIOLOGY

## 2021-05-27 PROCEDURE — 3700000001 HC ADD 15 MINUTES (ANESTHESIA): Performed by: ORTHOPAEDIC SURGERY

## 2021-05-27 PROCEDURE — 93010 ELECTROCARDIOGRAM REPORT: CPT | Performed by: INTERNAL MEDICINE

## 2021-05-27 PROCEDURE — 51798 US URINE CAPACITY MEASURE: CPT

## 2021-05-27 PROCEDURE — 0QS606Z REPOSITION RIGHT UPPER FEMUR WITH INTRAMEDULLARY INTERNAL FIXATION DEVICE, OPEN APPROACH: ICD-10-PCS | Performed by: ORTHOPAEDIC SURGERY

## 2021-05-27 PROCEDURE — 7100000001 HC PACU RECOVERY - ADDTL 15 MIN: Performed by: ORTHOPAEDIC SURGERY

## 2021-05-27 PROCEDURE — 85027 COMPLETE CBC AUTOMATED: CPT

## 2021-05-27 PROCEDURE — C1713 ANCHOR/SCREW BN/BN,TIS/BN: HCPCS | Performed by: ORTHOPAEDIC SURGERY

## 2021-05-27 PROCEDURE — 2580000003 HC RX 258: Performed by: NURSE ANESTHETIST, CERTIFIED REGISTERED

## 2021-05-27 PROCEDURE — 3600000015 HC SURGERY LEVEL 5 ADDTL 15MIN: Performed by: ORTHOPAEDIC SURGERY

## 2021-05-27 PROCEDURE — 6360000002 HC RX W HCPCS: Performed by: ORTHOPAEDIC SURGERY

## 2021-05-27 PROCEDURE — 2720000010 HC SURG SUPPLY STERILE: Performed by: ORTHOPAEDIC SURGERY

## 2021-05-27 PROCEDURE — 3600000005 HC SURGERY LEVEL 5 BASE: Performed by: ORTHOPAEDIC SURGERY

## 2021-05-27 PROCEDURE — 6360000002 HC RX W HCPCS: Performed by: PHYSICIAN ASSISTANT

## 2021-05-27 PROCEDURE — 3700000000 HC ANESTHESIA ATTENDED CARE: Performed by: ORTHOPAEDIC SURGERY

## 2021-05-27 PROCEDURE — 6360000002 HC RX W HCPCS: Performed by: NURSE ANESTHETIST, CERTIFIED REGISTERED

## 2021-05-27 PROCEDURE — 36415 COLL VENOUS BLD VENIPUNCTURE: CPT

## 2021-05-27 PROCEDURE — 3209999900 FLUORO FOR SURGICAL PROCEDURES

## 2021-05-27 PROCEDURE — 80069 RENAL FUNCTION PANEL: CPT

## 2021-05-27 PROCEDURE — 2500000003 HC RX 250 WO HCPCS: Performed by: ANESTHESIOLOGY

## 2021-05-27 PROCEDURE — 73552 X-RAY EXAM OF FEMUR 2/>: CPT

## 2021-05-27 PROCEDURE — 7100000000 HC PACU RECOVERY - FIRST 15 MIN: Performed by: ORTHOPAEDIC SURGERY

## 2021-05-27 DEVICE — TRIGEN LOW PROFILE SCREW 5.0MM X 30MM
Type: IMPLANTABLE DEVICE | Site: HIP | Status: FUNCTIONAL
Brand: TRIGEN

## 2021-05-27 DEVICE — INTERTAN LAG/COMPRESSION SCREW KIT                                    95MM / 90MM
Type: IMPLANTABLE DEVICE | Site: HIP | Status: FUNCTIONAL
Brand: TRIGEN

## 2021-05-27 DEVICE — TRIGEN INTERTAN 10S 10MM X 18CM 125DEGREE
Type: IMPLANTABLE DEVICE | Site: HIP | Status: FUNCTIONAL
Brand: TRIGEN

## 2021-05-27 RX ORDER — SODIUM CHLORIDE 0.9 % (FLUSH) 0.9 %
5-40 SYRINGE (ML) INJECTION PRN
Status: DISCONTINUED | OUTPATIENT
Start: 2021-05-27 | End: 2021-05-30 | Stop reason: HOSPADM

## 2021-05-27 RX ORDER — SODIUM CHLORIDE 9 MG/ML
INJECTION, SOLUTION INTRAVENOUS CONTINUOUS
Status: DISCONTINUED | OUTPATIENT
Start: 2021-05-27 | End: 2021-05-30 | Stop reason: HOSPADM

## 2021-05-27 RX ORDER — LIDOCAINE HYDROCHLORIDE 20 MG/ML
INJECTION, SOLUTION INFILTRATION; PERINEURAL PRN
Status: DISCONTINUED | OUTPATIENT
Start: 2021-05-27 | End: 2021-05-27 | Stop reason: SDUPTHER

## 2021-05-27 RX ORDER — ONDANSETRON 2 MG/ML
INJECTION INTRAMUSCULAR; INTRAVENOUS PRN
Status: DISCONTINUED | OUTPATIENT
Start: 2021-05-27 | End: 2021-05-27 | Stop reason: SDUPTHER

## 2021-05-27 RX ORDER — MAGNESIUM HYDROXIDE 1200 MG/15ML
LIQUID ORAL CONTINUOUS PRN
Status: COMPLETED | OUTPATIENT
Start: 2021-05-27 | End: 2021-05-27

## 2021-05-27 RX ORDER — EPHEDRINE SULFATE 50 MG/ML
INJECTION INTRAVENOUS PRN
Status: DISCONTINUED | OUTPATIENT
Start: 2021-05-27 | End: 2021-05-27 | Stop reason: SDUPTHER

## 2021-05-27 RX ORDER — SODIUM CHLORIDE 0.9 % (FLUSH) 0.9 %
10 SYRINGE (ML) INJECTION PRN
Status: DISCONTINUED | OUTPATIENT
Start: 2021-05-27 | End: 2021-05-27 | Stop reason: HOSPADM

## 2021-05-27 RX ORDER — DEXAMETHASONE SODIUM PHOSPHATE 4 MG/ML
INJECTION, SOLUTION INTRA-ARTICULAR; INTRALESIONAL; INTRAMUSCULAR; INTRAVENOUS; SOFT TISSUE PRN
Status: DISCONTINUED | OUTPATIENT
Start: 2021-05-27 | End: 2021-05-27 | Stop reason: SDUPTHER

## 2021-05-27 RX ORDER — SODIUM CHLORIDE 9 MG/ML
25 INJECTION, SOLUTION INTRAVENOUS PRN
Status: DISCONTINUED | OUTPATIENT
Start: 2021-05-27 | End: 2021-05-30 | Stop reason: HOSPADM

## 2021-05-27 RX ORDER — CEFAZOLIN SODIUM 1 G/3ML
INJECTION, POWDER, FOR SOLUTION INTRAMUSCULAR; INTRAVENOUS PRN
Status: DISCONTINUED | OUTPATIENT
Start: 2021-05-27 | End: 2021-05-27

## 2021-05-27 RX ORDER — OXYCODONE HYDROCHLORIDE AND ACETAMINOPHEN 5; 325 MG/1; MG/1
2 TABLET ORAL PRN
Status: DISCONTINUED | OUTPATIENT
Start: 2021-05-27 | End: 2021-05-27 | Stop reason: HOSPADM

## 2021-05-27 RX ORDER — SODIUM CHLORIDE, SODIUM LACTATE, POTASSIUM CHLORIDE, CALCIUM CHLORIDE 600; 310; 30; 20 MG/100ML; MG/100ML; MG/100ML; MG/100ML
INJECTION, SOLUTION INTRAVENOUS CONTINUOUS
Status: DISCONTINUED | OUTPATIENT
Start: 2021-05-27 | End: 2021-05-27

## 2021-05-27 RX ORDER — HYDRALAZINE HYDROCHLORIDE 20 MG/ML
5 INJECTION INTRAMUSCULAR; INTRAVENOUS EVERY 10 MIN PRN
Status: DISCONTINUED | OUTPATIENT
Start: 2021-05-27 | End: 2021-05-27 | Stop reason: HOSPADM

## 2021-05-27 RX ORDER — PHENYLEPHRINE HCL IN 0.9% NACL 1 MG/10 ML
SYRINGE (ML) INTRAVENOUS PRN
Status: DISCONTINUED | OUTPATIENT
Start: 2021-05-27 | End: 2021-05-27 | Stop reason: SDUPTHER

## 2021-05-27 RX ORDER — SODIUM CHLORIDE 0.9 % (FLUSH) 0.9 %
5-40 SYRINGE (ML) INJECTION EVERY 12 HOURS SCHEDULED
Status: DISCONTINUED | OUTPATIENT
Start: 2021-05-27 | End: 2021-05-29

## 2021-05-27 RX ORDER — OXYCODONE HYDROCHLORIDE AND ACETAMINOPHEN 5; 325 MG/1; MG/1
1 TABLET ORAL PRN
Status: DISCONTINUED | OUTPATIENT
Start: 2021-05-27 | End: 2021-05-27 | Stop reason: HOSPADM

## 2021-05-27 RX ORDER — SODIUM CHLORIDE 9 MG/ML
25 INJECTION, SOLUTION INTRAVENOUS PRN
Status: DISCONTINUED | OUTPATIENT
Start: 2021-05-27 | End: 2021-05-27 | Stop reason: HOSPADM

## 2021-05-27 RX ORDER — PROPOFOL 10 MG/ML
INJECTION, EMULSION INTRAVENOUS PRN
Status: DISCONTINUED | OUTPATIENT
Start: 2021-05-27 | End: 2021-05-27 | Stop reason: SDUPTHER

## 2021-05-27 RX ORDER — FENTANYL CITRATE 50 UG/ML
INJECTION, SOLUTION INTRAMUSCULAR; INTRAVENOUS PRN
Status: DISCONTINUED | OUTPATIENT
Start: 2021-05-27 | End: 2021-05-27 | Stop reason: SDUPTHER

## 2021-05-27 RX ORDER — ONDANSETRON 2 MG/ML
4 INJECTION INTRAMUSCULAR; INTRAVENOUS EVERY 10 MIN PRN
Status: DISCONTINUED | OUTPATIENT
Start: 2021-05-27 | End: 2021-05-27 | Stop reason: HOSPADM

## 2021-05-27 RX ORDER — MEPERIDINE HYDROCHLORIDE 50 MG/ML
12.5 INJECTION INTRAMUSCULAR; INTRAVENOUS; SUBCUTANEOUS EVERY 5 MIN PRN
Status: DISCONTINUED | OUTPATIENT
Start: 2021-05-27 | End: 2021-05-27 | Stop reason: HOSPADM

## 2021-05-27 RX ORDER — ROCURONIUM BROMIDE 10 MG/ML
INJECTION, SOLUTION INTRAVENOUS PRN
Status: DISCONTINUED | OUTPATIENT
Start: 2021-05-27 | End: 2021-05-27 | Stop reason: SDUPTHER

## 2021-05-27 RX ORDER — SODIUM CHLORIDE, SODIUM LACTATE, POTASSIUM CHLORIDE, CALCIUM CHLORIDE 600; 310; 30; 20 MG/100ML; MG/100ML; MG/100ML; MG/100ML
INJECTION, SOLUTION INTRAVENOUS CONTINUOUS PRN
Status: DISCONTINUED | OUTPATIENT
Start: 2021-05-27 | End: 2021-05-27 | Stop reason: SDUPTHER

## 2021-05-27 RX ORDER — LABETALOL HYDROCHLORIDE 5 MG/ML
5 INJECTION, SOLUTION INTRAVENOUS EVERY 10 MIN PRN
Status: DISCONTINUED | OUTPATIENT
Start: 2021-05-27 | End: 2021-05-27 | Stop reason: HOSPADM

## 2021-05-27 RX ORDER — SODIUM CHLORIDE 0.9 % (FLUSH) 0.9 %
10 SYRINGE (ML) INJECTION EVERY 12 HOURS SCHEDULED
Status: DISCONTINUED | OUTPATIENT
Start: 2021-05-27 | End: 2021-05-27 | Stop reason: HOSPADM

## 2021-05-27 RX ADMIN — EPHEDRINE SULFATE 10 MG: 50 INJECTION INTRAVENOUS at 16:52

## 2021-05-27 RX ADMIN — EPHEDRINE SULFATE 10 MG: 50 INJECTION INTRAVENOUS at 15:40

## 2021-05-27 RX ADMIN — CEFAZOLIN SODIUM 2 G: 10 INJECTION, POWDER, FOR SOLUTION INTRAVENOUS at 15:10

## 2021-05-27 RX ADMIN — LIDOCAINE HYDROCHLORIDE 60 MG: 20 INJECTION, SOLUTION INFILTRATION; PERINEURAL at 15:10

## 2021-05-27 RX ADMIN — MORPHINE SULFATE 4 MG: 4 INJECTION, SOLUTION INTRAMUSCULAR; INTRAVENOUS at 04:56

## 2021-05-27 RX ADMIN — Medication 100 MCG: at 15:54

## 2021-05-27 RX ADMIN — CEFAZOLIN SODIUM 2000 MG: 10 INJECTION, POWDER, FOR SOLUTION INTRAVENOUS at 23:57

## 2021-05-27 RX ADMIN — SODIUM CHLORIDE, PRESERVATIVE FREE 10 ML: 5 INJECTION INTRAVENOUS at 10:49

## 2021-05-27 RX ADMIN — LORAZEPAM 1 MG: 2 INJECTION, SOLUTION INTRAMUSCULAR; INTRAVENOUS at 00:03

## 2021-05-27 RX ADMIN — PROPOFOL 150 MG: 10 INJECTION, EMULSION INTRAVENOUS at 15:10

## 2021-05-27 RX ADMIN — ONDANSETRON 4 MG: 2 INJECTION INTRAMUSCULAR; INTRAVENOUS at 15:05

## 2021-05-27 RX ADMIN — OXYCODONE HYDROCHLORIDE AND ACETAMINOPHEN 1 TABLET: 5; 325 TABLET ORAL at 10:38

## 2021-05-27 RX ADMIN — EPHEDRINE SULFATE 5 MG: 50 INJECTION INTRAVENOUS at 16:09

## 2021-05-27 RX ADMIN — METOPROLOL SUCCINATE 25 MG: 25 TABLET, EXTENDED RELEASE ORAL at 09:13

## 2021-05-27 RX ADMIN — SODIUM CHLORIDE, PRESERVATIVE FREE 10 ML: 5 INJECTION INTRAVENOUS at 20:09

## 2021-05-27 RX ADMIN — ROCURONIUM BROMIDE 50 MG: 10 SOLUTION INTRAVENOUS at 15:10

## 2021-05-27 RX ADMIN — HYDROMORPHONE HYDROCHLORIDE 0.5 MG: 1 INJECTION, SOLUTION INTRAMUSCULAR; INTRAVENOUS; SUBCUTANEOUS at 17:19

## 2021-05-27 RX ADMIN — OXYCODONE HYDROCHLORIDE AND ACETAMINOPHEN 2 TABLET: 5; 325 TABLET ORAL at 20:08

## 2021-05-27 RX ADMIN — EPHEDRINE SULFATE 5 MG: 50 INJECTION INTRAVENOUS at 15:56

## 2021-05-27 RX ADMIN — Medication 100 MCG: at 15:17

## 2021-05-27 RX ADMIN — Medication 100 MCG: at 15:21

## 2021-05-27 RX ADMIN — FENTANYL CITRATE 25 MCG: 50 INJECTION INTRAMUSCULAR; INTRAVENOUS at 15:05

## 2021-05-27 RX ADMIN — CYCLOBENZAPRINE HYDROCHLORIDE 10 MG: 10 TABLET, FILM COATED ORAL at 20:07

## 2021-05-27 RX ADMIN — SUGAMMADEX 200 MG: 100 INJECTION, SOLUTION INTRAVENOUS at 16:42

## 2021-05-27 RX ADMIN — FAMOTIDINE 20 MG: 10 INJECTION, SOLUTION INTRAVENOUS at 09:15

## 2021-05-27 RX ADMIN — Medication 100 MCG: at 15:56

## 2021-05-27 RX ADMIN — Medication 100 MCG: at 15:39

## 2021-05-27 RX ADMIN — SODIUM CHLORIDE: 9 INJECTION, SOLUTION INTRAVENOUS at 20:07

## 2021-05-27 RX ADMIN — SODIUM CHLORIDE, SODIUM LACTATE, POTASSIUM CHLORIDE, AND CALCIUM CHLORIDE: .6; .31; .03; .02 INJECTION, SOLUTION INTRAVENOUS at 15:05

## 2021-05-27 RX ADMIN — DEXAMETHASONE SODIUM PHOSPHATE 8 MG: 4 INJECTION, SOLUTION INTRAMUSCULAR; INTRAVENOUS at 15:10

## 2021-05-27 ASSESSMENT — PULMONARY FUNCTION TESTS
PIF_VALUE: 12
PIF_VALUE: 14
PIF_VALUE: 13
PIF_VALUE: 10
PIF_VALUE: 15
PIF_VALUE: 13
PIF_VALUE: 14
PIF_VALUE: 13
PIF_VALUE: 12
PIF_VALUE: 13
PIF_VALUE: 13
PIF_VALUE: 7
PIF_VALUE: 14
PIF_VALUE: 12
PIF_VALUE: 13
PIF_VALUE: 2
PIF_VALUE: 13
PIF_VALUE: 14
PIF_VALUE: 13
PIF_VALUE: 16
PIF_VALUE: 0
PIF_VALUE: 14
PIF_VALUE: 12
PIF_VALUE: 15
PIF_VALUE: 13
PIF_VALUE: 13
PIF_VALUE: 16
PIF_VALUE: 14
PIF_VALUE: 14
PIF_VALUE: 15
PIF_VALUE: 13
PIF_VALUE: 2
PIF_VALUE: 12
PIF_VALUE: 15
PIF_VALUE: 1
PIF_VALUE: 0
PIF_VALUE: 11
PIF_VALUE: 13
PIF_VALUE: 15
PIF_VALUE: 13
PIF_VALUE: 13
PIF_VALUE: 14

## 2021-05-27 ASSESSMENT — PAIN SCALES - GENERAL
PAINLEVEL_OUTOF10: 6
PAINLEVEL_OUTOF10: 0
PAINLEVEL_OUTOF10: 6
PAINLEVEL_OUTOF10: 7
PAINLEVEL_OUTOF10: 0
PAINLEVEL_OUTOF10: 8
PAINLEVEL_OUTOF10: 7

## 2021-05-27 ASSESSMENT — PAIN DESCRIPTION - ORIENTATION
ORIENTATION: RIGHT
ORIENTATION: RIGHT

## 2021-05-27 ASSESSMENT — PAIN DESCRIPTION - PAIN TYPE
TYPE: SURGICAL PAIN
TYPE: ACUTE PAIN

## 2021-05-27 ASSESSMENT — PAIN DESCRIPTION - DESCRIPTORS: DESCRIPTORS: DISCOMFORT

## 2021-05-27 ASSESSMENT — PAIN DESCRIPTION - LOCATION
LOCATION: HIP
LOCATION: HIP

## 2021-05-27 ASSESSMENT — PAIN SCALES - WONG BAKER
WONGBAKER_NUMERICALRESPONSE: 0
WONGBAKER_NUMERICALRESPONSE: 6
WONGBAKER_NUMERICALRESPONSE: 0

## 2021-05-27 NOTE — PLAN OF CARE
Problem: Falls - Risk of:  Goal: Will remain free from falls  Description: Will remain free from falls  5/27/2021 1206 by Derrell Quinones RN  Outcome: Ongoing

## 2021-05-27 NOTE — PROGRESS NOTES
Perfect Serve:  Rn to [address] [place] [address]  Pt here for R hip fracture. Plan for surgery tomorrow. Patient is experiencing muscle spams in R leg and was requesting a muscle relaxer.  Thank you

## 2021-05-27 NOTE — BRIEF OP NOTE
Brief Postoperative Note      Patient: Marilou Smith  YOB: 1950  MRN: [id number]    Date of Procedure: 5/27/2021    Pre-Op Diagnosis: RIGHT INTERTROCHANTERIC HIP FRACTURE    Post-Op Diagnosis: Same       Procedure(s):  1. RIGHT HIP INTRAMEDULLARY NAIL KIKO INSERTION (00794)  2. X-RAY INTERPRETATION RIGHT HIP (38834)    Surgeon(s):  King Heredia MD    Assistant:  Surgical Assistant: Sarai Colón    Anesthesia: General    Estimated Blood Loss (mL): 270 cc    Complications: None    Specimens:   * No specimens in log *    Implants:  Implant Name Type Inv.  Item Serial No.  Lot No. LRB No. Used Action   NAIL IM L180MM [id number] 125DEG SHT [employer]ITH[employer]  NAIL IM L180MM [id number] 125DEG SHT SUNITHA INTERTROCHANTERIC [facility] [employer]- [id number] Right 1 Implanted   KIT SCR LAG L95MM [id number] COMPR L90MM DIA7MM INTEGR INTLOK  KIT SCR LAG L95MM [id number] COMPR L90MM DIA7MM INTEGR INTLOK  [facility] [id number] Right 1 Implanted   SCREW BNE L30MM DIA5MM HEDY TI INT CAPT HEX LO PROF FOR IM  SCREW BNE L30MM DIA5MM HEDY TI INT CAPT HEX LO PROF FOR IM  [place] AND [employer] [facility] [id number] Right 1 Implanted         Drains: * No LDAs found *    Antibiotics: 2 g ancef IV prior to incision    Findings: Displaced right intertrochanteric femur fracture    Electronically signed by King Heredia MD on 5/27/2021 at 4:25 PM

## 2021-05-27 NOTE — PROGRESS NOTES
Comprehensive Nutrition Assessment    Type and Reason for Visit:  Initial, Positive Nutrition Screen (Wt loss and poor appetite)    Nutrition Recommendations/Plan:   1. Recommend general diet when able to consume PO   2. RD to add ensure enlive chocolate TID when medically feasible  3. RD to order updated actual weight   4. Monitor nutrition adequacy, pertinent labs, bowel habits, wt changes, and clinical progress    Nutrition Assessment:  Positive nutrition screen for wt loss and poor appetite: Admitted with RIGHT hip pain. Plans for OR. Currently NPO for procedure. Pt reports good PO intake and appetite prior to admission and feels hungry today. Usually consumes yogurt and granola at home. Willing to trial ONS- RD to add ensure enlive TID for extra calories and protein. NO N/V/D reported. Pt reports some weight loss, unable to assess d/t stated body weight. RD to order updated body weight to better assess nutrition needs. Will continue to monitor. Malnutrition Assessment:  Malnutrition Status: At risk for malnutrition (Comment)      Estimated Daily Nutrient Needs:  Energy (kcal):  6670-6570 kcal/kg; Weight Used for Energy Requirements:  Ideal (57 kg)     Protein (g):  57-68 g/day; Weight Used for Protein Requirements:  Ideal (1-1.2 g/kg)        Fluid (ml/day):  1700 ml; Method Used for Fluid Requirements:  1 ml/kcal      Nutrition Related Findings:  + BM 5/26. Wounds:  None       Current Nutrition Therapies:    Diet NPO, After Midnight Exceptions are: Sips of Water with Meds    Anthropometric Measures:  · Height: 5' 5\" (165.1 cm)  · Current Body Weight: 145 lb (65.8 kg)    · Ideal Body Weight: 125 lbs; % Ideal Body Weight 116 %   · BMI: 24.1   · BMI Categories: Normal Weight (BMI 18.5-24. 9)       Nutrition Diagnosis:   · Inadequate oral intake related to early satiety, psychological cause or life stress as evidenced by intake 26-50%, intake 51-75%, weight loss    Nutrition Interventions:   Food and/or Nutrient Delivery:  Continue NPO, Start Oral Nutrition Supplement (Advanced per MD)  Nutrition Education/Counseling:  No recommendation at this time   Coordination of Nutrition Care:  Continue to monitor while inpatient    Goals:  Consume 50% or greater of 3 meals per day and ONS.        Nutrition Monitoring and Evaluation:   Behavioral-Environmental Outcomes:      Food/Nutrient Intake Outcomes:  Food and Nutrient Intake, Supplement Intake  Physical Signs/Symptoms Outcomes:        Discharge Planning:    Continue current diet, Continue Oral Nutrition Supplement     Electronically signed by Velma Baltazar MS, RD, LD on 5/27/21 at 9:54 AM EDT    Contact: Office: [phone]; [address] [place] [address] Road: [id number]
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
## 2021-05-27 NOTE — PROGRESS NOTES
Gotti was attempted to be placed last night with no success. Ramsey Lombard was placed. There was no documented output all night and pts daughter states that her mother has not had any output. Pt states that she feels like she needs to go to the bathroom but cannot. Bladder scan showed more than 930 ml in bladder. Attempted to place gotti catheter with Rin Salas RN with no success. Straight catheter was placed and urine was returned. 800 ml was returned from straight catheter. Spoke with PASCALE LINDSAY and updated her and agreed for gotti catheter to be placed in surgery.  Pt states that she has had difficulty having a catheter placed in the past.

## 2021-05-27 NOTE — PROGRESS NOTES
Pt arrived from PACU. Villagomez catheter is in place. Pt is resting comfortably and denies pain at this time. Family is at bedside. Pt daughter ordered dinner for her.

## 2021-05-27 NOTE — OP NOTE
discussed. At no time were any guarantees implied or stated. A valid consent form was signed. Risks and benefits of the procedure were fully explained in detail, including but not limited to non union, hardware complication, infection, neurovascular injury, continued pain, arthritis, stiffness, need for further surgery, re-injury, DVT, PE, general risks of anesthesia and loss of limb or life. They demonstrated understanding and wished to proceed. Patient Positioning and Surgical Prep  The patient was seen in the holding area and the right lower extremity marked with an indelible pen. The patient was taken to the operative suite and while on the hospital bed, general anesthesia was administered. The patient was transferred to the fracture table. The affected leg was placed in boot traction after the foot was well padded. The unaffected leg was gently flexed, abducted and externally rotated in a well leg dumont and all bony prominences were well padded. The fracture was well visualized using biplanar fluoroscopy and the fracture reduced or manipulated as required. The lower extremity was prepped from the flank to knee with Chloroprep and then draped in the normal sterile fashion. A timeout was then performed per protocol. 2 g IV ancef was administered prior to incision. Procedure  An incision was made proximal to the greater trochanter and hemostasis was obtained. The fascia and muscle were split in line with their fibers. Using biplanar, c-arm fluoroscopy. A starting hole was identified at the tip of the greater trochanter and an awl was inserted. A  guide wire was inserted into the medullary canal.  The proximal femur reamed with the proximal femoral opening reamer and then starting at 9 mm the femur was sequentially reamed up to 11.5 mm with the flexible reamers. The intramedullary nail was attached to the insertion handle and inserted over the reaming guide wire.   The guide wire was removed. The proximal locking guide was attached. A second incision was made along the lateral aspect of the thigh and dissection carried down through the fascia to bone. The blade guide sleeve was inserted and snapped into the aiming guide. The sleeve assembly was advanced to the lateral femur. A starting pin for the lag screw was drilled and proper placement in the femoral head was confirmed with biplanar fluoroscopy. It measured 100 mm and a 95 mm lag screw was selected. A 7 mm starting drill for the compression screw was then drilled. The compression screw slot was then drilled 95 mm for a 90 mm compression screw. An antirotation bar was inserted. The lag screw was drilled to appropriate length and the lag screw was inserted. The antirotation bar was removed and a 90 mm compression screw was inserted to allow for compression as it was engaged. Fluoroscopy was used to check proper placement. A third incision was made along the lateral thigh and fascia. The trocar assembly was inserted to the lateral cortex through the aiming guide. A  calibrated drill bit was drilled through the nail hole across both cortices. The length was measured 30 mm and a 30 mm x 5.0 mm distal locking screw inserted. Using the flexible screwdriver, the preassembled set screw was tightened. The  and aiming guide were disengaged. Final fluoroscopic views were obtained. X-ray Addendum: AP and Lateral 2 views of the right hip obtained intraoperatively with fluoroscopy demonstrate appropriate reduction and insertion of the hardware with appropriate alignment with good fixation. Closure and Disposition  The wounds were copiously irrigated and closed in layers with 0 vicryl for the fascia, 2-0 vicryl subcutaneous, and 4-0 monocryl with dermabond for skin. Sterile dressings were applied. All sponge and needle counts were correct.   The patient was awakened and taken to the postoperative area in stable condition. Iraj Paz MD  [facility] [address]

## 2021-05-27 NOTE — PLAN OF CARE
Problem: Pain:  Goal: Control of acute pain  Description: Control of acute pain  Outcome: Ongoing  Note: Pt scoring pain on 0-10 scale. Pain medications given per MAR. Pt instructed to call out when pain level increasing. Call light within reach. Nurse will continue to reassess and monitor.

## 2021-05-27 NOTE — PROGRESS NOTES
Patient transferred via bed in stable condition with all belongings to room 530. Milton Blanchard, RN

## 2021-05-27 NOTE — FLOWSHEET NOTE
05/27/21 1038   Pain Assessment   Pain Assessment 0-10   Pain Level 6   Patient's Stated Pain Goal 1   Pain Type Acute pain   Pain Location Hip   Pain Orientation Right   Pt medicated with 5 mg of percocet per MAR PRN.  Will reassess pain

## 2021-05-27 NOTE — CARE COORDINATION
CASE MANAGEMENT INITIAL ASSESSMENT      Reviewed chart and completed assessment via telephone with:Pt and daughter at bedside  Explained Case Management role/services. Yes    Primary contact information:Daughter [address] :     See above      Can this person be reached and be able to respond quickly, such as within a few minutes or hours? Yes  Who would be your back-up decision maker? Name Erika Wilks spouse  Phone Number:[phone]    Admit date/status:5/26 IP  Diagnosis:Hip Fx  Is this a Readmission?:  No      Insurance:[facility]   Precert required for SNF: No       3 night stay required: No waived    Living arrangements, Adls, care needs, prior to admission:Pt lives w spouse. Pt is IPTA- cares for spouse who is a quadriplegic    Transportation: pt drives PTA     Durable Medical Equipment at home:  Walker__Cane__RTS__ BSC__Shower Chair__  02__ HHN__ CPAP__  BiPap__  Hospital Bed__ W/C___ Other__None belonging to pt________    Services in the home and/or outpatient, prior to admission: For spouse- [employer]      ·     PT/OT recs: Pending    Hospital Exemption Notification (HEN):needed if SNF    Barriers to discharge:None identified    Plan/comments:Discussed poss SNF at [facility] w SNF list- await choice and PT recs     ECOC on chart for MD ana Whitney, RN

## 2021-05-27 NOTE — PLAN OF CARE
Nutrition Problem #1: Inadequate oral intake  Intervention: Food and/or Nutrient Delivery: Continue NPO, Start Oral Nutrition Supplement (Advanced per MD)  Nutritional Goals: Consume 50% or greater of 3 meals per day and ONS.

## 2021-05-27 NOTE — PROGRESS NOTES
Pt has removed all belongings from body. IV antibiotics with pt. PT has remained NPO since MN. Consent signed and in chart.

## 2021-05-28 LAB
HCT VFR BLD CALC: 25 % (ref 36–48)
HEMOGLOBIN: 8.5 G/DL (ref 12–16)
MCH RBC QN AUTO: 31.4 PG (ref 26–34)
MCHC RBC AUTO-ENTMCNC: 34 G/DL (ref 31–36)
MCV RBC AUTO: 92.4 FL (ref 80–100)
PDW BLD-RTO: 15.2 % (ref 12.4–15.4)
PLATELET # BLD: 138 K/UL (ref 135–450)
PMV BLD AUTO: 8.5 FL (ref 5–10.5)
RBC # BLD: 2.71 M/UL (ref 4–5.2)
WBC # BLD: 6.9 K/UL (ref 4–11)

## 2021-05-28 PROCEDURE — 2580000003 HC RX 258: Performed by: ORTHOPAEDIC SURGERY

## 2021-05-28 PROCEDURE — 6370000000 HC RX 637 (ALT 250 FOR IP): Performed by: ORTHOPAEDIC SURGERY

## 2021-05-28 PROCEDURE — 1200000000 HC SEMI PRIVATE

## 2021-05-28 PROCEDURE — 6360000002 HC RX W HCPCS: Performed by: ORTHOPAEDIC SURGERY

## 2021-05-28 PROCEDURE — 97530 THERAPEUTIC ACTIVITIES: CPT

## 2021-05-28 PROCEDURE — 97162 PT EVAL MOD COMPLEX 30 MIN: CPT

## 2021-05-28 PROCEDURE — 97166 OT EVAL MOD COMPLEX 45 MIN: CPT

## 2021-05-28 PROCEDURE — 36415 COLL VENOUS BLD VENIPUNCTURE: CPT

## 2021-05-28 PROCEDURE — 85027 COMPLETE CBC AUTOMATED: CPT

## 2021-05-28 PROCEDURE — 97110 THERAPEUTIC EXERCISES: CPT

## 2021-05-28 RX ADMIN — ASPIRIN 81 MG: 81 TABLET, CHEWABLE ORAL at 09:04

## 2021-05-28 RX ADMIN — CYCLOBENZAPRINE HYDROCHLORIDE 10 MG: 10 TABLET, FILM COATED ORAL at 09:05

## 2021-05-28 RX ADMIN — CYCLOBENZAPRINE HYDROCHLORIDE 10 MG: 10 TABLET, FILM COATED ORAL at 20:07

## 2021-05-28 RX ADMIN — Medication 10 ML: at 20:11

## 2021-05-28 RX ADMIN — MORPHINE SULFATE 4 MG: 4 INJECTION, SOLUTION INTRAMUSCULAR; INTRAVENOUS at 03:20

## 2021-05-28 RX ADMIN — ACETAMINOPHEN 650 MG: 325 TABLET ORAL at 20:07

## 2021-05-28 RX ADMIN — ENOXAPARIN SODIUM 40 MG: 40 INJECTION SUBCUTANEOUS at 09:02

## 2021-05-28 RX ADMIN — LISINOPRIL 2.5 MG: 2.5 TABLET ORAL at 09:03

## 2021-05-28 RX ADMIN — ATORVASTATIN CALCIUM 40 MG: 40 TABLET, FILM COATED ORAL at 09:04

## 2021-05-28 RX ADMIN — OXYCODONE HYDROCHLORIDE AND ACETAMINOPHEN 2 TABLET: 5; 325 TABLET ORAL at 22:32

## 2021-05-28 RX ADMIN — OXYCODONE HYDROCHLORIDE AND ACETAMINOPHEN 1 TABLET: 5; 325 TABLET ORAL at 11:45

## 2021-05-28 RX ADMIN — METOPROLOL SUCCINATE 25 MG: 25 TABLET, EXTENDED RELEASE ORAL at 09:04

## 2021-05-28 RX ADMIN — POLYETHYLENE GLYCOL 3350 17 G: 17 POWDER, FOR SOLUTION ORAL at 12:20

## 2021-05-28 RX ADMIN — MORPHINE SULFATE 2 MG: 2 INJECTION, SOLUTION INTRAMUSCULAR; INTRAVENOUS at 20:07

## 2021-05-28 RX ADMIN — SODIUM CHLORIDE, PRESERVATIVE FREE 10 ML: 5 INJECTION INTRAVENOUS at 22:36

## 2021-05-28 RX ADMIN — OXYCODONE HYDROCHLORIDE AND ACETAMINOPHEN 1 TABLET: 5; 325 TABLET ORAL at 06:36

## 2021-05-28 RX ADMIN — CEFAZOLIN SODIUM 2000 MG: 10 INJECTION, POWDER, FOR SOLUTION INTRAVENOUS at 06:35

## 2021-05-28 ASSESSMENT — PAIN SCALES - GENERAL
PAINLEVEL_OUTOF10: 3
PAINLEVEL_OUTOF10: 5
PAINLEVEL_OUTOF10: 7
PAINLEVEL_OUTOF10: 7
PAINLEVEL_OUTOF10: 9
PAINLEVEL_OUTOF10: 6
PAINLEVEL_OUTOF10: 5
PAINLEVEL_OUTOF10: 5

## 2021-05-28 ASSESSMENT — PAIN DESCRIPTION - ORIENTATION
ORIENTATION: RIGHT

## 2021-05-28 ASSESSMENT — PAIN DESCRIPTION - LOCATION
LOCATION: HIP

## 2021-05-28 ASSESSMENT — PAIN DESCRIPTION - PAIN TYPE
TYPE: ACUTE PAIN
TYPE: ACUTE PAIN;SURGICAL PAIN
TYPE: ACUTE PAIN;SURGICAL PAIN

## 2021-05-28 NOTE — PROGRESS NOTES
Orthopedic Surgery Progress Note    Mariana Castillo  5/28/2021    Subjective:     Post-Operative Day #1 status post IM nail right intertrochanteric femur fracture. Overall she is doing fairly well. She has some soreness. No dizziness or chest pain or shortness of breath. Objective:     /66   Pulse 99   Temp 98 °F (36.7 °C) (Oral)   Resp 16   Ht 5' 5\" (1.651 m)   Wt 145 lb (65.8 kg)   SpO2 96%   BMI 24.13 kg/m²     Intake/Output Summary (Last 24 hours) at 5/28/2021 1259  Last data filed at 5/28/2021 0522  Gross per 24 hour   Intake 1825 ml   Output 950 ml   Net 875 ml     DRAIN/TUBE OUTPUT:       General: alert, appears stated age and cooperative   Wound:  Dressings clean, dry, intact   Extremity: Distal NVI   DVT Exam:  No evidence of DVT     Sensation intact light touch S/SP/DP/S/T, positive dorsiflexion, plantarflexion, EHL, 2+ dorsalis pedis pulse    Data Review  CBC:   Lab Results   Component Value Date    WBC 6.9 05/28/2021    RBC 2.71 05/28/2021    RBC 4.57 11/22/2016    HGB 8.5 05/28/2021    HCT 25.0 05/28/2021     05/28/2021       Assessment:     73-year-old female postop day 1 status post IM nail right intertrochanteric femur fracture    Plan:     1. Pain control  2. PT OT -50% weightbearing right lower extremity at this time  3. DVT prophylaxis -Lovenox  4. Postop acute blood loss anemia, monitor vitals  5. Discharge planning pending progress. Plan for ECF. We will follow up in 10 to 14 days after discharge. Contact number [phone] or [phone].     Daniel Vallejo MD

## 2021-05-28 NOTE — CARE COORDINATION
Reviewed DC plan w pt and daughter. In process of selecting SNF from list- encouraged to complete choice in a timely manner to facilitate DC choice. Continue to follow.   Maryann Lott RN

## 2021-05-28 NOTE — PLAN OF CARE
Problem: Falls - Risk of:  Goal: Will remain free from falls  Description: Will remain free from falls  5/28/2021 1157 by Thad Raman RN  Outcome: Ongoing

## 2021-05-28 NOTE — CARE COORDINATION
Spoke to pt about DC plan to SNF. States daughters are looking at facilities at this time- but pt has chose [facility] or [facility]. Family also tentatively looking at respite placement for pt's spouse, who is disabled. Call to liaisons and face sheet faxed. Continue to follow. Riya Rao RN       [address] Either [facility] or [facility] can accept. At this time pt prefers [facility]. Continue to follow.   Riya Rao RN

## 2021-05-28 NOTE — PROGRESS NOTES
5/27/2021). Restrictions  Restrictions/Precautions  Restrictions/Precautions: Weight Bearing, Fall Risk  Lower Extremity Weight Bearing Restrictions  Right Lower Extremity Weight Bearing: Partial Weight Bearing  Partial Weight Bearing Percentage Or Pounds: 50%    Subjective   General  Chart Reviewed: Yes  Patient assessed for rehabilitation services?: Yes  Family / Caregiver Present: Yes (daughter)  Referring Practitioner: Dr. Natanael Singh  Diagnosis: fall with: RIGHT INTERTROCHANTERIC HIP fracture; s/p Right IM nailing 5-27-21  Subjective  Subjective: \"I don't how to move\"  General Comment  Comments: RN cleared pt for OT eval; pt agreeable to therapy  Patient Currently in Pain: Yes  Pain Assessment  Pain Assessment: 0-10  Pain Level: 5  Pain Type: Acute pain;Surgical pain  Pain Location: Hip  Pain Orientation: Right  Non-Pharmaceutical Pain Intervention(s): Ambulation/Increased Activity; Therapeutic presence;Repositioned;Cold applied  Pre Treatment Pain Screening  Intervention List: Patient able to continue with treatment (RN had provided pain meds prior to therapy)  Vital Signs    Social/Functional History  Social/Functional History  Lives With: Spouse (w/c bound, dependent for care)  Type of Home: House  Home Layout: Multi-level, Able to Live on Main level with bedroom/bathroom  Home Access: Ramped entrance  Bathroom Shower/Tub: Walk-in shower, Shower chair without back  Bathroom Toilet: Standard  Bathroom Equipment: Grab bars in shower, 3-in-1 commode  Home Equipment: Cane, Standard walker  ADL Assistance: Independent  Homemaking Responsibilities: Yes  Meal Prep Responsibility: Primary  Laundry Responsibility: Primary  Cleaning Responsibility: Primary  Shopping Responsibility: Primary  Dependent Care Responsibility: Primary (spouse for w/c transfers with sliding board, daughter A spouse with bath)  Ambulation Assistance: Independent  Transfer Assistance: Independent  Active : Yes  Mode of Transportation: Lynnette Jasbir (handicapped Ivonne Ill or car)  Occupation: Retired  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 walker)  Standing Balance  Time: 1-2 minutes  Activity: transfer & walking next to bed  Comment: increased RIght hip pain with mobility\"7/10\"  ADL  Feeding: Independent  Grooming: Setup (in bed to wash face)  LE Dressing: Maximum assistance  Toileting: Dependent/Total (gotti catheter)    RUE Tone: Normotonic  LUE Tone: Normotonic  Coordination  Movements Are Fluid And Coordinated: Yes     Bed mobility  Sit to Supine: Unable to assess (Left up in chair upon exiting, pt & daughter agreeable)  Transfers  Sit to stand: Minimal assistance  Stand to sit: Minimal assistance  Transfer Comments: cues for safe hand placement    Vision - Basic Assessment  Prior Vision: No visual deficits     Cognition  Overall Cognitive Status: WFL        Sensation  Overall Sensation Status: WFL    LUE AROM : WFL  RUE AROM : WFL    Gross LUE Strength: WFL  Gross RUE Strength: WFL        Plan   Plan  Times per week: 4-6x/ week  Current Treatment Recommendations: Strengthening, Balance Training, Functional Mobility Training, Safety Education & Training, Self-Care / ADL, Endurance Training      AM-PAC Score        AM-[facility] Inpatient Daily Activity Raw Score: 13 (05/28/21 1038)  AM-PAC Inpatient ADL T-Scale Score : 32.03 (05/28/21 1038)  ADL Inpatient CMS 0-100% Score: 63.03 (05/28/21 1038)  ADL Inpatient CMS G-Code Modifier : CL (05/28/21 1038)    Goals  Short term goals  Time Frame for Short term goals: 1 week(6-04-21)  Short term goal 1: CGA with bathroom mobility with walker by 6-02-21  Short term goal 2: mod assist of 1 with bed mobility  Short term goal 3: CGA functional/toilet transfers by 6-02-21  Short term goal 4: min assist with LE dressing with AE by 6-04-21  Short term goal 5: tolerate 15-20 reps BUE strengthening exercises  Patient Goals   Patient goals : less pain to walk       Therapy Time   Individual Concurrent Group Co-treatment   Time In 0925         Time Out 1003         Minutes [address]

## 2021-05-28 NOTE — PLAN OF CARE
Problem: Falls - Risk of:  Goal: Will remain free from falls  Description: Will remain free from falls  Outcome: Ongoing     Problem: Activity:  Goal: Ability to ambulate will improve  Description: Ability to ambulate will improve  Outcome: Ongoing       . Michael Montalvo Bed in lowest position, wheels locked, 2/4 side rails up, nonskid footwear on. Bed/ chair check alarm in place, call light within reach. Pt instructed to call out when needing assistance. Pt stated understanding. Nurse will continue to monitor. Michael Montalvo .Pt scoring pain on 0-10 scale. Pain medications given per MAR. Pt instructed to call out when pain level increasing. Call light within reach. Nurse will continue to reassess and monitor.

## 2021-05-28 NOTE — PROGRESS NOTES
PS sent to PASCALE majano regarding keeping pts gotti in until tomorrow. Pt is still not very mobile and had urine retention prior to surgery. Waiting for response.

## 2021-05-28 NOTE — PROGRESS NOTES
Physical Therapy    Facility/Department: [facility] C5 - MED SURG/ORTHO  Initial Assessment    NAME: Gaston Saldivar  : 1950  MRN: [id number]    Date of Service: 2021    Discharge Recommendations:  Subacute/Skilled Nursing Facility   PT Equipment Recommendations  Equipment Needed: No  Other: defer to facility  If pt is unable to be seen after this session, please let this note serve as discharge summary. Please see case management note for discharge disposition. Thank you. Assessment   Body structures, Functions, Activity limitations: Decreased functional mobility ; Increased pain;Decreased balance;Decreased ADL status; Decreased ROM; Decreased strength;Decreased endurance  Assessment: Pt functioning below basleine following R hip fx. Pt was previously indep and now needing Ax2 for trasnfers and neededing walker for short ambulation. Pt limited by pain and 50% WBing through R LE. Due to mobility limitations and independence, recommend SNF upon DC. Treatment Diagnosis: decreased mobility  Prognosis: Good  Decision Making: Medium Complexity  PT Education: Goals; General Safety;Gait Training;Disease Specific Education;PT Role;Plan of Care;Family Education; Functional Mobility Training;Equipment;Weight-bearing Education  Patient Education: Pt expressed understanding on WBing precautions and use of AD with mobility  Barriers to Learning: none  REQUIRES PT FOLLOW UP: Yes  Activity Tolerance  Activity Tolerance: Patient limited by pain  Activity Tolerance: /54, 120 HR, 97% SpO2       Patient Diagnosis(es): The encounter diagnosis was Closed fracture of right hip, initial encounter ([facility] [address] has a past medical history of CAD (coronary artery disease), Cancer ([facility] [address] Chest pain, Family history of early CAD, Hyperlipidemia, Hypertension, and Knee pain.    has a past surgical history that includes  section; Percutaneous Transluminal Coronary Angio (2015); eye surgery; Coronary angioplasty with stent 1024)  Mobility Inpatient CMS G-Code Modifier : CL (05/28/21 1024)          Goals  Short term goals  Time Frame for Short term goals: 6/3  Short term goal 1: Pt will complete bed mobility wtih Min A x 1  Short term goal 2: Pt will ambulate x 15' with RW vs SW with supervision  Short term goal 3: Pt will tolerate sitting in the chair x 60 minutes  Short term goal 4: Pt will complete B LE exercises to improve functional mobility by 5/30  Patient Goals   Patient goals : to walk with a walker to the bathroom       Therapy Time   Individual Concurrent Group Co-treatment   Time In 0939         Time Out 1005         Minutes 26         Timed Code Treatment Minutes: [address], PT

## 2021-05-28 NOTE — PROGRESS NOTES
Hospitalist Progress Note      PCP: Reyna Sanchez MD    Date of Admission: 5/26/2021    Chief Complaint:   RIGHT hip pain    Hospital Course: 78 y.o. female who presented to [facility] with acute severe RIGHT hip pain after mechanical fall. Found to have a right femoral neck fracture. Patient was admitted underwent evaluation by orthopedic surgery, underwent surgical fixation with pinning/screw. Subjective: EMR notes reviewed patient seen and examined up in chair looking well pain is tolerable with medication. Tolerating p.o. no chest pain shortness of breath no fevers      Medications:  Reviewed    Infusion Medications    sodium chloride 125 mL/hr at 05/27/21 2007    sodium chloride      sodium chloride       Scheduled Medications    sodium chloride flush  5-40 mL Intravenous 2 times per day    enoxaparin  40 mg Subcutaneous Daily    aspirin  81 mg Oral Daily    atorvastatin  40 mg Oral Daily    lisinopril  2.5 mg Oral Daily    metoprolol succinate  25 mg Oral Daily    sodium chloride flush  5-40 mL Intravenous 2 times per day     PRN Meds: sodium chloride flush, sodium chloride, oxyCODONE-acetaminophen **OR** oxyCODONE-acetaminophen, morphine **OR** morphine, nitroGLYCERIN, sodium chloride flush, sodium chloride, promethazine **OR** ondansetron, polyethylene glycol, acetaminophen **OR** acetaminophen, LORazepam, cyclobenzaprine      Intake/Output Summary (Last 24 hours) at 5/28/2021 0727  Last data filed at 5/28/2021 4793  Gross per 24 hour   Intake 1825 ml   Output 1750 ml   Net 75 ml       Physical Exam Performed:    /66   Pulse 89   Temp 98.7 °F (37.1 °C) (Oral)   Resp 16   Ht 5' 5\" (1.651 m)   Wt 145 lb (65.8 kg)   SpO2 96%   BMI 24.13 kg/m²     General appearance: No apparent distress, appears stated age and cooperative. HEENT: Pupils equal, round, and reactive to light. Conjunctivae/corneas clear. Neck: Supple, with full range of motion.  No jugular venous distention. Trachea midline. Respiratory:  Normal respiratory effort. Clear to auscultation, bilaterally without Rales/Wheezes/Rhonchi. Cardiovascular: Regular rate and rhythm with normal S1/S2 without murmurs, rubs or gallops. Abdomen: Soft, non-tender, non-distended with normal bowel sounds. Musculoskeletal: No clubbing, cyanosis or edema bilaterally. Hip dressing clean dry and intact  Skin: Skin color, texture, turgor normal.  No rashes or lesions. Neurologic:  Neurovascularly intact without any focal sensory/motor deficits. Cranial nerves: II-XII intact, grossly non-focal.  Psychiatric: Alert and oriented, thought content appropriate, normal insight  Capillary Refill: Brisk,3 seconds, normal   Peripheral Pulses: +2 palpable, equal bilaterally       Labs:   Recent Labs     05/26/21  1559 05/27/21  0527 05/28/21  0636   WBC 3.3* 5.9 6.9   HGB 10.1* 9.8* 8.5*   HCT 29.8* 28.9* 25.0*    145 138     Recent Labs     05/26/21  1559 05/27/21  0527    136   K 3.7 4.5    101   CO2 25 28   BUN 21* 21*   CREATININE 0.8 1.0   CALCIUM 8.7 8.8   PHOS  --  3.5     No results for input(s): AST, ALT, BILIDIR, BILITOT, ALKPHOS in the last 72 hours. No results for input(s): INR in the last 72 hours. No results for input(s): Paralee Fontan in the last 72 hours. Urinalysis:      Lab Results   Component Value Date    NITRU Negative 05/27/2021    WBCUA 0-2 05/27/2021    BACTERIA see below 05/08/2016    RBCUA 3-4 05/27/2021    BLOODU TRACE-INTACT 05/27/2021    SPECGRAV 1.025 05/27/2021    GLUCOSEU Negative 05/27/2021       Radiology:  XR FEMUR RIGHT (MIN 2 VIEWS)   Preliminary Result   Intraprocedural fluoroscopic spot images as above. See separate procedure   report for more information. XR HIP RIGHT (2-3 VIEWS)   Final Result   Intertrochanteric fracture right femoral neck         XR CHEST PORTABLE   Final Result   No acute process by radiograph.          FLUORO FOR SURGICAL PROCEDURES (Results Pending)           Assessment/Plan:    Active Hospital Problems    Diagnosis     Hip fracture ([facility] [address] [S72.009A]     Anemia [D64.9]     [facility] [S72.001A]     HTN (hypertension) [I10]     CAD (coronary artery disease) [I25.10]     HLD (hyperlipidemia) [E78.5]      Mechanical fall/femur fracture  -Underwent orthopedic consultation status post hip pinning  -PT OT weightbearing status and DVT prophylaxis per Ortho    Anemia: Now with a additional acute blood loss anemia has had minimal decrease in H&H but monitor transfuse for hemoglobin less than 7 or symptomatic    Coronary artery disease/hypertension/hyperlipidemia  -No acute issues at this time continue cardio prophylactic medications    DVT Prophylaxis: Currently on Lovenox  Diet: DIET GENERAL;  Code Status: Full Code    PT/OT Eval Status: Ordered and active    Dispo -pending clinical course suspect the patient will go home    INNA Etienne - CNP

## 2021-05-29 LAB
ANION GAP SERPL CALCULATED.3IONS-SCNC: 7 MMOL/L (ref 3–16)
BUN BLDV-MCNC: 27 MG/DL (ref 7–20)
CALCIUM SERPL-MCNC: 8.6 MG/DL (ref 8.3–10.6)
CHLORIDE BLD-SCNC: 103 MMOL/L (ref 99–110)
CO2: 27 MMOL/L (ref 21–32)
CREAT SERPL-MCNC: 0.9 MG/DL (ref 0.6–1.2)
GFR AFRICAN AMERICAN: >60
GFR NON-AFRICAN AMERICAN: >60
GLUCOSE BLD-MCNC: 139 MG/DL (ref 70–99)
HCT VFR BLD CALC: 24.4 % (ref 36–48)
HEMOGLOBIN: 8.2 G/DL (ref 12–16)
MCH RBC QN AUTO: 31.8 PG (ref 26–34)
MCHC RBC AUTO-ENTMCNC: 33.8 G/DL (ref 31–36)
MCV RBC AUTO: 94.1 FL (ref 80–100)
PDW BLD-RTO: 15.7 % (ref 12.4–15.4)
PLATELET # BLD: 147 K/UL (ref 135–450)
PMV BLD AUTO: 8 FL (ref 5–10.5)
POTASSIUM REFLEX MAGNESIUM: 5 MMOL/L (ref 3.5–5.1)
RBC # BLD: 2.59 M/UL (ref 4–5.2)
SODIUM BLD-SCNC: 137 MMOL/L (ref 136–145)
WBC # BLD: 6.5 K/UL (ref 4–11)

## 2021-05-29 PROCEDURE — 6360000002 HC RX W HCPCS: Performed by: ORTHOPAEDIC SURGERY

## 2021-05-29 PROCEDURE — 97116 GAIT TRAINING THERAPY: CPT

## 2021-05-29 PROCEDURE — 85027 COMPLETE CBC AUTOMATED: CPT

## 2021-05-29 PROCEDURE — 2580000003 HC RX 258: Performed by: ORTHOPAEDIC SURGERY

## 2021-05-29 PROCEDURE — 6370000000 HC RX 637 (ALT 250 FOR IP): Performed by: ORTHOPAEDIC SURGERY

## 2021-05-29 PROCEDURE — 80048 BASIC METABOLIC PNL TOTAL CA: CPT

## 2021-05-29 PROCEDURE — 97530 THERAPEUTIC ACTIVITIES: CPT

## 2021-05-29 PROCEDURE — 1200000000 HC SEMI PRIVATE

## 2021-05-29 PROCEDURE — 97110 THERAPEUTIC EXERCISES: CPT

## 2021-05-29 PROCEDURE — 36415 COLL VENOUS BLD VENIPUNCTURE: CPT

## 2021-05-29 RX ADMIN — SODIUM CHLORIDE: 9 INJECTION, SOLUTION INTRAVENOUS at 17:45

## 2021-05-29 RX ADMIN — ATORVASTATIN CALCIUM 40 MG: 40 TABLET, FILM COATED ORAL at 09:54

## 2021-05-29 RX ADMIN — ASPIRIN 81 MG: 81 TABLET, CHEWABLE ORAL at 09:55

## 2021-05-29 RX ADMIN — METOPROLOL SUCCINATE 25 MG: 25 TABLET, EXTENDED RELEASE ORAL at 09:55

## 2021-05-29 RX ADMIN — SODIUM CHLORIDE: 9 INJECTION, SOLUTION INTRAVENOUS at 12:05

## 2021-05-29 RX ADMIN — CYCLOBENZAPRINE HYDROCHLORIDE 10 MG: 10 TABLET, FILM COATED ORAL at 06:51

## 2021-05-29 RX ADMIN — CYCLOBENZAPRINE HYDROCHLORIDE 10 MG: 10 TABLET, FILM COATED ORAL at 14:27

## 2021-05-29 RX ADMIN — Medication 10 ML: at 09:50

## 2021-05-29 RX ADMIN — ACETAMINOPHEN 650 MG: 325 TABLET ORAL at 14:27

## 2021-05-29 RX ADMIN — OXYCODONE HYDROCHLORIDE AND ACETAMINOPHEN 1 TABLET: 5; 325 TABLET ORAL at 06:51

## 2021-05-29 RX ADMIN — ENOXAPARIN SODIUM 40 MG: 40 INJECTION SUBCUTANEOUS at 09:56

## 2021-05-29 RX ADMIN — MORPHINE SULFATE 4 MG: 4 INJECTION, SOLUTION INTRAMUSCULAR; INTRAVENOUS at 21:04

## 2021-05-29 RX ADMIN — POLYETHYLENE GLYCOL 3350 17 G: 17 POWDER, FOR SOLUTION ORAL at 09:58

## 2021-05-29 RX ADMIN — SODIUM CHLORIDE, PRESERVATIVE FREE 10 ML: 5 INJECTION INTRAVENOUS at 21:24

## 2021-05-29 ASSESSMENT — PAIN SCALES - GENERAL
PAINLEVEL_OUTOF10: 10
PAINLEVEL_OUTOF10: 8
PAINLEVEL_OUTOF10: 4
PAINLEVEL_OUTOF10: 4
PAINLEVEL_OUTOF10: 0
PAINLEVEL_OUTOF10: 4
PAINLEVEL_OUTOF10: 4
PAINLEVEL_OUTOF10: 7
PAINLEVEL_OUTOF10: 8
PAINLEVEL_OUTOF10: 4

## 2021-05-29 ASSESSMENT — PAIN DESCRIPTION - PAIN TYPE
TYPE: ACUTE PAIN
TYPE: SURGICAL PAIN
TYPE: ACUTE PAIN

## 2021-05-29 ASSESSMENT — PAIN DESCRIPTION - DESCRIPTORS: DESCRIPTORS: DISCOMFORT

## 2021-05-29 ASSESSMENT — PAIN DESCRIPTION - ORIENTATION
ORIENTATION: RIGHT

## 2021-05-29 ASSESSMENT — PAIN DESCRIPTION - LOCATION
LOCATION: HIP

## 2021-05-29 ASSESSMENT — PAIN DESCRIPTION - ONSET: ONSET: ON-GOING

## 2021-05-29 ASSESSMENT — PAIN - FUNCTIONAL ASSESSMENT: PAIN_FUNCTIONAL_ASSESSMENT: PREVENTS OR INTERFERES SOME ACTIVE ACTIVITIES AND ADLS

## 2021-05-29 ASSESSMENT — PAIN DESCRIPTION - FREQUENCY: FREQUENCY: CONTINUOUS

## 2021-05-29 ASSESSMENT — PAIN SCALES - WONG BAKER
WONGBAKER_NUMERICALRESPONSE: 0
WONGBAKER_NUMERICALRESPONSE: 0

## 2021-05-29 ASSESSMENT — PAIN DESCRIPTION - PROGRESSION: CLINICAL_PROGRESSION: NOT CHANGED

## 2021-05-29 NOTE — FLOWSHEET NOTE
05/29/21 1427   Pain Assessment   Pain Assessment 0-10   Pain Level 4   Patient's Stated Pain Goal 1   Pain Type Acute pain   Pain Location Hip   Pain Orientation Right   Pt medicated with 650 mg of tylenol per her request and flexeril.  Will reassess pain

## 2021-05-29 NOTE — FLOWSHEET NOTE
05/29/21 0836   Vital Signs   Temp 98.5 °F (36.9 °C)   Temp Source Oral   Pulse 118   Heart Rate Source Monitor   Resp 16   BP (!) 96/58   BP Location Right upper arm   MAP (mmHg) 71   Patient Position Semi fowlers   Level of Consciousness Alert (0)   MEWS Score 4   Patient Currently in Pain Yes   PS sent to Dr. Hiren Grant regarding pts vitals and mews score. No labs were ordered this morning. Requested order for labs.

## 2021-05-29 NOTE — PROGRESS NOTES
Orthopedic Surgery Progress Note    Kimberley Tierney  5/29/2021    Subjective:     Post-Operative Day #2 status post IM nail right intertrochanteric femur fracture. She does have some pain. She has had some dizziness when up and moving around. Has had some episodes of hypotension and tachycardia. Objective:     /65   Pulse 112   Temp 98 °F (36.7 °C) (Oral)   Resp 16   Ht 5' 5\" (1.651 m)   Wt 145 lb (65.8 kg)   SpO2 94%   BMI 24.13 kg/m²     Intake/Output Summary (Last 24 hours) at 5/29/2021 1501  Last data filed at 5/29/2021 1205  Gross per 24 hour   Intake 10 ml   Output 2225 ml   Net -2215 ml     DRAIN/TUBE OUTPUT:       General: alert, appears stated age and cooperative   Wound:  Dressings clean, dry, intact   Extremity: Distal NVI   DVT Exam:  No evidence of DVT     Sensation intact light touch S/SP/DP/S/T, positive dorsiflexion, plantarflexion, EHL, 2+ dorsalis pedis pulse    Data Review  CBC:   Lab Results   Component Value Date    WBC 6.5 05/29/2021    RBC 2.59 05/29/2021    RBC 4.57 11/22/2016    HGB 8.2 05/29/2021    HCT 24.4 05/29/2021     05/29/2021       Assessment:     43-year-old female postop day 2 status post IM nail right intertrochanteric femur fracture    Plan:     1. Pain control  2. PT OT -50% weightbearing right lower extremity at this time  3. DVT prophylaxis -Lovenox  4. Postop acute blood loss anemia, monitor vitals. Fluids and oral hydration encouraged  5. Discharge planning pending progress. Plan for ECF. We will follow up in 10 to 14 days after discharge. Contact number [phone] or [phone].     Yevgeniy Mckeon MD

## 2021-05-29 NOTE — PLAN OF CARE
Problem: Falls - Risk of:  Goal: Will remain free from falls  Description: Will remain free from falls  Outcome: Ongoing     Problem:  Activity:  Goal: Ability to ambulate will improve  Description: Ability to ambulate will improve  Outcome: Ongoing

## 2021-05-29 NOTE — PROGRESS NOTES
Physical Therapy  Facility/Department: [facility] C5 - MED SURG/ORTHO  Daily Treatment Note  NAME: Dafne Rivas  : 1950  MRN: [id number]    Date of Service: 2021    Discharge Recommendations:  Subacute/Skilled Nursing Facility   PT Equipment Recommendations  Equipment Needed: No  Other: defer to facility  If pt discharges prior to next PT session this note will serve as discharge summary. Assessment   Assessment: Pt functioning below basleine following R hip fx. Pt will benefit from skilled PT to address deficits. Pt was previously indep and now needing Ax2 for trasnfers and neededing walker for short ambulation. Pt limited by pain and 50% WBing through R LE. Due to mobility limitations and independence, recommend SNF upon DC. Treatment Diagnosis: decreased mobility  Prognosis: Good  PT Education: Goals; General Safety;Gait Training;Disease Specific Education;PT Role;Plan of Care;Family Education; Functional Mobility Training;Equipment;Weight-bearing Education  Patient Education: Pt expressed understanding on WBing precautions and use of AD with mobility  Barriers to Learning: none  REQUIRES PT FOLLOW UP: Yes  Activity Tolerance  Activity Tolerance: Patient Tolerated treatment well;Patient limited by pain  Activity Tolerance: Semifowlers /58    SpO2 96%  Sitting 91/50  HR68  Post amb and up in chair 100/46   Return to semifowlers 109/72       Patient Diagnosis(es): The encounter diagnosis was Closed fracture of right hip, initial encounter ([facility] [address] has a past medical history of CAD (coronary artery disease), Cancer ([facility] [address] Chest pain, Family history of early CAD, Hyperlipidemia, Hypertension, and Knee pain. has a past surgical history that includes  section; Percutaneous Transluminal Coronary Angio (2015); eye surgery; Coronary angioplasty with stent (2015);  Cardiac surgery; total nephrectomy (Right, 16); and Femur fracture surgery (Right, 5/27/2021). Restrictions  Restrictions/Precautions  Restrictions/Precautions: Weight Bearing, Fall Risk  Lower Extremity Weight Bearing Restrictions  Right Lower Extremity Weight Bearing: Partial Weight Bearing  Partial Weight Bearing Percentage Or Pounds: 50%  Subjective   General  Chart Reviewed: Yes  Response To Previous Treatment: Patient with no complaints from previous session. Family / Caregiver Present: No  Referring Practitioner: Daniel Vallejo MD  Subjective  Subjective: pt resting in bed. Reports 4/10 R hip pain at rest, up to 9 with activity  General Comment  Comments: cleared by nursing  Pain Screening  Patient Currently in Pain: Yes  Pain Assessment  Pain Assessment: 0-10  Pain Level: 4  Pain Type: Surgical pain  Pain Location: Hip  Pain Orientation: Right  Pain Descriptors: Discomfort  Pain Frequency: Continuous  Pain Onset: On-going  Clinical Progression: Not changed  Functional Pain Assessment: Prevents or interferes some active activities and ADLs  Response to Pain Intervention: Patient Satisfied  Vital Signs- see activity tolerance section  Patient Currently in Pain: Yes       Orientation  Orientation  Overall Orientation Status: Within Normal Limits     Objective   Bed mobility  Supine to Sit: Moderate assistance;2 Person assistance  Sit to Supine: 2 Person assistance; Moderate assistance  Transfers  Sit to Stand: Moderate Assistance (bed elevated slightly)  Stand to sit: Minimal Assistance  Ambulation  WB Status: 50% R LE  Device: Standard Walker  Assistance: Minimal assistance  Quality of Gait: small step length. Able to maintain 50% WBing R LE.  Step to pattern  Distance: 20 ft x 1  Comments: Returned to bed post amb due to high HR and low BP        Exercises  Quad Sets: 10 x B  Heelslides: 12 x RLE  Gluteal Sets: 12 x B  Knee Short Arc Quad: 10 x RLE  Ankle Pumps: 15 x B      AM-PAC Score 14  CL             Goals  Short term goals  Time Frame for Short term goals: 6/3  Short term goal 1: Pt will complete bed mobility wtih Min A x 1: 5/29 mod assist of 2  Short term goal 2: Pt will ambulate x 15' with RW vs SW with supervision: pt amb 20 ft with SW and min assist  Short term goal 3: Pt will tolerate sitting in the chair x 60 minutes: 5/29 up in chair only 5 min, returned to bed due to low BP, high HR  Short term goal 4: Pt will complete B LE exercises to improve functional mobility by 5/30: 5/29 goal MET, ongoing  Patient Goals   Patient goals : to walk with a walker to the bathroom    Plan    Plan  Times per week: 7x/week  Times per day: Daily  Current Treatment Recommendations: Strengthening, Balance Training, Functional Mobility Training, Transfer Training, Gait Training, Pain Management, Positioning, Modalities, Equipment Evaluation, Education, & procurement, Patient/Caregiver Education & Training, Safety Education & Training, Home Exercise Program  Safety Devices  Type of devices:  All fall risk precautions in place, Gait belt, Patient at risk for falls, Bed alarm in place, Call light within reach, Left in bed, Nurse notified  Restraints  Initially in place: No     Therapy Time   Individual Concurrent Group Co-treatment   Time In 1030         Time Out 1100         Minutes [address] [place] [address], PT

## 2021-05-29 NOTE — CARE COORDINATION
ALON received call from [facility] and there is only 1 bed available for pt and there would not be room for spouse for respite care. ALON communicated this to Pt's daughter Axel Gamboa who was on board and informed ALON that [facility] is preferred. At discharge, ALON will work to assist pt in getting to [facility]. A rapid covid would be needed on day of discharge.

## 2021-05-29 NOTE — PROGRESS NOTES
Occupational Therapy  Facility/Department: [facility] C5 - MED SURG/ORTHO  Daily Treatment Note  NAME: Mayra Santana  : 1950  MRN: [id number]    Date of Service: 2021    Discharge Recommendations:  Subacute/Skilled Nursing Facility  OT Equipment Recommendations  Equipment Needed: No    Assessment   Performance deficits / Impairments: Decreased functional mobility ; Decreased balance;Decreased ADL status; Decreased high-level IADLs;Decreased endurance  Assessment: Pt requiring assist x2 bed mobility this date and increased time d/t RLE pain with movement. Pt requiring Mod A t/fs and Min A ambulation with SW with increased time and cueing for sequencing/safety awareness. Pt presenting with low BP upon standing, pt reporting dizziness upon ambulating in room and seated upright in chair, pt returned to bed and left with needs within reach. Nursing notified of BP. Cont POC  OT Education: OT Role;Plan of Care;Precautions  Patient Education: RLE WB;ing and precautions  Barriers to Learning: low BP  REQUIRES OT FOLLOW UP: Yes  Activity Tolerance  Activity Tolerance: Patient Tolerated treatment well;Treatment limited secondary to medical complications (free text)  Activity Tolerance: high jones's:  BP = 105/54, HR = 102, 95 % O 2 sats on RA; sitting in chair after walking next to bed BP = 97/56;  Safety Devices  Safety Devices in place: Yes  Type of devices: Call light within reach;Nurse notified; All fall risk precautions in place; Left in bed;Bed alarm in place;Gait belt;Patient at risk for falls  Restraints  Initially in place: No         Patient Diagnosis(es): The encounter diagnosis was Closed fracture of right hip, initial encounter ([facility] [address] has a past medical history of CAD (coronary artery disease), Cancer ([facility] [address] Chest pain, Family history of early CAD, Hyperlipidemia, Hypertension, and Knee pain.    has a past surgical history that includes  section; Percutaneous Transluminal Coronary Angio (6/22/2015); eye surgery; Coronary angioplasty with stent (June 2015); Cardiac surgery; total nephrectomy (Right, 05/13/16); and Femur fracture surgery (Right, 5/27/2021). Restrictions  Restrictions/Precautions  Restrictions/Precautions: Weight Bearing, Fall Risk  Lower Extremity Weight Bearing Restrictions  Right Lower Extremity Weight Bearing: Partial Weight Bearing  Partial Weight Bearing Percentage Or Pounds: 50%  Position Activity Restriction  Other position/activity restrictions: gotti  Subjective   General  Chart Reviewed: Yes  Patient assessed for rehabilitation services?: Yes  Family / Caregiver Present: No  Referring Practitioner: Dr. Kayley Golden  Diagnosis: fall with: RIGHT INTERTROCHANTERIC HIP fracture; s/p Right IM nailing 5-27-21  Subjective  Subjective: Pt supine in bed upon arrival and agreeable to session  General Comment  Comments: RN cleared pt for OT  Vital Signs  Patient Currently in Pain: Denies   Orientation  Orientation  Overall Orientation Status: Within Functional Limits  Objective    ADL  UE Dressing: Supervision  LE Dressing: Maximum assistance  Toileting: Dependent/Total (gotti)        Balance  Sitting Balance: Supervision (EOB)  Standing Balance: Minimal assistance (SW)  Standing Balance  Time: 3-4 mins  Activity: mobility, t/fs  Comment: Mod A x2 increasing to Min A x1 SW  Functional Mobility  Functional - Mobility Device: Standard Walker  Activity: Other  Assist Level: Minimal assistance  Bed mobility  Supine to Sit: Moderate assistance;2 Person assistance  Sit to Supine: 2 Person assistance; Moderate assistance  Transfers  Sit to stand:  Moderate assistance  Stand to sit: Minimal assistance  Transfer Comments: cues for safe hand placement                       Cognition  Overall Cognitive Status: WFL                    Type of ROM/Therapeutic Exercise  Type of ROM/Therapeutic Exercise: AROM  Comment: Pt completing x2 sets of x15 reps alternating shoulder press/chest press to increase strength and endurance        Hand Dominance  Hand Dominance: Right           Plan   Plan  Times per week: 4-6x/ week  Current Treatment Recommendations: Strengthening, Balance Training, Functional Mobility Training, Safety Education & Training, Self-Care / ADL, Endurance Training    AM-PAC Score        AM-PAC Inpatient Daily Activity Raw Score: 14 (05/29/21 1459)  AM-PAC Inpatient ADL T-Scale Score : 33.39 (05/29/21 1459)  ADL Inpatient CMS 0-100% Score: 59.67 (05/29/21 1459)  ADL Inpatient CMS G-Code Modifier : CK (05/29/21 1459)    Goals  Short term goals  Time Frame for Short term goals: 1 week(6-04-21)  Short term goal 1: CGA with bathroom mobility with walker by 6-02-21  Short term goal 2: mod assist of 1 with bed mobility  Short term goal 3: CGA functional/toilet transfers by 6-02-21  Short term goal 4: min assist with LE dressing with AE by 6-04-21  Short term goal 5: tolerate 15-20 reps BUE strengthening exercises - GOAL MET 5/29  Patient Goals   Patient goals : less pain to walk       Therapy Time   Individual Concurrent Group Co-treatment   Time In 1030         Time Out 1108         Minutes 38         Timed Code Treatment Minutes: 729 IRAIS Castillo/ALIYAH

## 2021-05-29 NOTE — PROGRESS NOTES
Villagomez catheter removed. 10 ML taken out of balloon. Explained to the pt the importance of voiding within 7 hours. Fluids restarted. Call light within reach and explained to the pt the importance of calling when she needs to go to the bathroom.

## 2021-05-29 NOTE — PROGRESS NOTES
Hospitalist Progress Note      PCP: Venus Goldsmith MD    Date of Admission: 5/26/2021    Chief Complaint:   RIGHT hip pain    Hospital Course: 78 y.o. female who presented to [facility] with acute severe RIGHT hip pain after mechanical fall. Found to have a right femoral neck fracture. Patient was admitted underwent evaluation by orthopedic surgery, underwent surgical fixation with pinning/screw. Subjective: EMR notes reviewed patient seen and examined. Patient had some soft blood pressures this a.m. holding BP meds we will continue fluids> tolerating p.o. pain controlled with p.o. meds      Medications:  Reviewed    Infusion Medications    sodium chloride 125 mL/hr at 05/27/21 2007    sodium chloride Stopped (05/28/21 0900)    sodium chloride       Scheduled Medications    enoxaparin  40 mg Subcutaneous Daily    aspirin  81 mg Oral Daily    atorvastatin  40 mg Oral Daily    lisinopril  2.5 mg Oral Daily    metoprolol succinate  25 mg Oral Daily    sodium chloride flush  5-40 mL Intravenous 2 times per day     PRN Meds: sodium chloride flush, sodium chloride, oxyCODONE-acetaminophen **OR** oxyCODONE-acetaminophen, morphine **OR** morphine, nitroGLYCERIN, sodium chloride flush, sodium chloride, promethazine **OR** ondansetron, polyethylene glycol, acetaminophen **OR** acetaminophen, LORazepam, cyclobenzaprine      Intake/Output Summary (Last 24 hours) at 5/29/2021 1117  Last data filed at 5/29/2021 0950  Gross per 24 hour   Intake 10 ml   Output 1875 ml   Net -1865 ml       Physical Exam Performed:    BP (!) 101/52   Pulse 119   Temp 98.5 °F (36.9 °C) (Oral)   Resp 16   Ht 5' 5\" (1.651 m)   Wt 145 lb (65.8 kg)   SpO2 94%   BMI 24.13 kg/m²     General appearance: No apparent distress, appears stated age and cooperative. HEENT: Pupils equal, round, and reactive to light. Conjunctivae/corneas clear. Neck: Supple, with full range of motion. No jugular venous distention.  Trachea midline. Respiratory:  Normal respiratory effort. Clear to auscultation, bilaterally without Rales/Wheezes/Rhonchi. Cardiovascular: Regular rate and rhythm with normal S1/S2 without murmurs, rubs or gallops. Abdomen: Soft, non-tender, non-distended with normal bowel sounds. Musculoskeletal: No clubbing, cyanosis or edema bilaterally. Hip dressing clean dry and intact  Skin: Skin color, texture, turgor normal.  No rashes or lesions. Neurologic:  Neurovascularly intact without any focal sensory/motor deficits. Cranial nerves: II-XII intact, grossly non-focal.  Psychiatric: Alert and oriented, thought content appropriate, normal insight  Capillary Refill: Brisk,3 seconds, normal   Peripheral Pulses: +2 palpable, equal bilaterally       Labs:   Recent Labs     05/27/21  0527 05/28/21  0636 05/29/21  0931   WBC 5.9 6.9 6.5   HGB 9.8* 8.5* 8.2*   HCT 28.9* 25.0* 24.4*    138 147     Recent Labs     05/26/21  1559 05/27/21  0527 05/29/21  0931    136 137   K 3.7 4.5 5.0    101 103   CO2 25 28 27   BUN 21* 21* 27*   CREATININE 0.8 1.0 0.9   CALCIUM 8.7 8.8 8.6   PHOS  --  3.5  --      No results for input(s): AST, ALT, BILIDIR, BILITOT, ALKPHOS in the last 72 hours. No results for input(s): INR in the last 72 hours. No results for input(s): Cherene Fagan in the last 72 hours. Urinalysis:      Lab Results   Component Value Date    NITRU Negative 05/27/2021    WBCUA 0-2 05/27/2021    BACTERIA see below 05/08/2016    RBCUA 3-4 05/27/2021    BLOODU TRACE-INTACT 05/27/2021    SPECGRAV 1.025 05/27/2021    GLUCOSEU Negative 05/27/2021       Radiology:  XR FEMUR RIGHT (MIN 2 VIEWS)   Final Result   Intraprocedural fluoroscopic spot images as above. See separate procedure   report for more information. XR HIP RIGHT (2-3 VIEWS)   Final Result   Intertrochanteric fracture right femoral neck         XR CHEST PORTABLE   Final Result   No acute process by radiograph.          FLUORO FOR SURGICAL PROCEDURES    (Results Pending)           Assessment/Plan:    Active Hospital Problems    Diagnosis     Hip fracture ([facility] [address] [S72.009A]     Anemia [D64.9]     [facility] [S72.001A]     HTN (hypertension) [I10]     CAD (coronary artery disease) [I25.10]     HLD (hyperlipidemia) [E78.5]      Mechanical fall/femur fracture  -Underwent orthopedic consultation status post hip pinning  -PT OT weightbearing status and DVT prophylaxis per Ortho    Anemia: Now with a additional acute blood loss anemia has had minimal decrease in H&H but monitor transfuse for hemoglobin less than 7 or symptomatic    Coronary artery disease/hypertension/hyperlipidemia  -No acute issues at this time continue cardio prophylactic medications    DVT Prophylaxis: Currently on Lovenox  Diet: DIET GENERAL;  Code Status: Full Code    PT/OT Eval Status: Ordered and active    Dispo -perhaps tomorrow    Michelle Pete, APRN - CNP

## 2021-05-30 VITALS
SYSTOLIC BLOOD PRESSURE: 128 MMHG | TEMPERATURE: 97.6 F | DIASTOLIC BLOOD PRESSURE: 72 MMHG | BODY MASS INDEX: 24.16 KG/M2 | WEIGHT: 145 LBS | HEART RATE: 100 BPM | OXYGEN SATURATION: 97 % | RESPIRATION RATE: 16 BRPM | HEIGHT: 65 IN

## 2021-05-30 LAB
ANION GAP SERPL CALCULATED.3IONS-SCNC: 6 MMOL/L (ref 3–16)
BUN BLDV-MCNC: 23 MG/DL (ref 7–20)
CALCIUM SERPL-MCNC: 8.4 MG/DL (ref 8.3–10.6)
CHLORIDE BLD-SCNC: 102 MMOL/L (ref 99–110)
CO2: 28 MMOL/L (ref 21–32)
CREAT SERPL-MCNC: 0.7 MG/DL (ref 0.6–1.2)
GFR AFRICAN AMERICAN: >60
GFR NON-AFRICAN AMERICAN: >60
GLUCOSE BLD-MCNC: 144 MG/DL (ref 70–99)
HCT VFR BLD CALC: 24.6 % (ref 36–48)
HEMOGLOBIN: 8.2 G/DL (ref 12–16)
MCH RBC QN AUTO: 30.8 PG (ref 26–34)
MCHC RBC AUTO-ENTMCNC: 33.3 G/DL (ref 31–36)
MCV RBC AUTO: 92.6 FL (ref 80–100)
PDW BLD-RTO: 15.2 % (ref 12.4–15.4)
PLATELET # BLD: 175 K/UL (ref 135–450)
PMV BLD AUTO: 7.7 FL (ref 5–10.5)
POTASSIUM REFLEX MAGNESIUM: 4 MMOL/L (ref 3.5–5.1)
RBC # BLD: 2.66 M/UL (ref 4–5.2)
SARS-COV-2, NAAT: NOT DETECTED
SODIUM BLD-SCNC: 136 MMOL/L (ref 136–145)
WBC # BLD: 5.1 K/UL (ref 4–11)

## 2021-05-30 PROCEDURE — 97530 THERAPEUTIC ACTIVITIES: CPT

## 2021-05-30 PROCEDURE — 6360000002 HC RX W HCPCS: Performed by: ORTHOPAEDIC SURGERY

## 2021-05-30 PROCEDURE — 87635 SARS-COV-2 COVID-19 AMP PRB: CPT

## 2021-05-30 PROCEDURE — 85027 COMPLETE CBC AUTOMATED: CPT

## 2021-05-30 PROCEDURE — 80048 BASIC METABOLIC PNL TOTAL CA: CPT

## 2021-05-30 PROCEDURE — 36415 COLL VENOUS BLD VENIPUNCTURE: CPT

## 2021-05-30 PROCEDURE — 97110 THERAPEUTIC EXERCISES: CPT

## 2021-05-30 PROCEDURE — 6370000000 HC RX 637 (ALT 250 FOR IP): Performed by: ORTHOPAEDIC SURGERY

## 2021-05-30 RX ORDER — OXYCODONE HYDROCHLORIDE AND ACETAMINOPHEN 5; 325 MG/1; MG/1
1 TABLET ORAL EVERY 4 HOURS PRN
Qty: 10 TABLET | Refills: 0 | Status: SHIPPED | OUTPATIENT
Start: 2021-05-30 | End: 2021-06-06

## 2021-05-30 RX ORDER — POLYETHYLENE GLYCOL 3350 17 G/17G
17 POWDER, FOR SOLUTION ORAL DAILY PRN
Qty: 527 G | Refills: 1 | Status: SHIPPED | OUTPATIENT
Start: 2021-05-30 | End: 2021-06-29

## 2021-05-30 RX ADMIN — OXYCODONE HYDROCHLORIDE AND ACETAMINOPHEN 2 TABLET: 5; 325 TABLET ORAL at 12:54

## 2021-05-30 RX ADMIN — ENOXAPARIN SODIUM 40 MG: 40 INJECTION SUBCUTANEOUS at 08:21

## 2021-05-30 RX ADMIN — LORAZEPAM 1 MG: 2 INJECTION, SOLUTION INTRAMUSCULAR; INTRAVENOUS at 01:59

## 2021-05-30 RX ADMIN — ASPIRIN 81 MG: 81 TABLET, CHEWABLE ORAL at 08:21

## 2021-05-30 RX ADMIN — METOPROLOL SUCCINATE 25 MG: 25 TABLET, EXTENDED RELEASE ORAL at 08:21

## 2021-05-30 RX ADMIN — MORPHINE SULFATE 4 MG: 4 INJECTION, SOLUTION INTRAMUSCULAR; INTRAVENOUS at 01:58

## 2021-05-30 RX ADMIN — OXYCODONE HYDROCHLORIDE AND ACETAMINOPHEN 2 TABLET: 5; 325 TABLET ORAL at 00:01

## 2021-05-30 RX ADMIN — ATORVASTATIN CALCIUM 40 MG: 40 TABLET, FILM COATED ORAL at 08:21

## 2021-05-30 ASSESSMENT — PAIN DESCRIPTION - ORIENTATION
ORIENTATION: RIGHT

## 2021-05-30 ASSESSMENT — PAIN SCALES - GENERAL
PAINLEVEL_OUTOF10: 4
PAINLEVEL_OUTOF10: 0
PAINLEVEL_OUTOF10: 7
PAINLEVEL_OUTOF10: 4

## 2021-05-30 ASSESSMENT — PAIN DESCRIPTION - LOCATION
LOCATION: HIP

## 2021-05-30 ASSESSMENT — PAIN DESCRIPTION - DESCRIPTORS
DESCRIPTORS: DISCOMFORT
DESCRIPTORS: DISCOMFORT

## 2021-05-30 ASSESSMENT — PAIN DESCRIPTION - PAIN TYPE
TYPE: ACUTE PAIN
TYPE: ACUTE PAIN

## 2021-05-30 ASSESSMENT — PAIN - FUNCTIONAL ASSESSMENT: PAIN_FUNCTIONAL_ASSESSMENT: PREVENTS OR INTERFERES SOME ACTIVE ACTIVITIES AND ADLS

## 2021-05-30 NOTE — PROGRESS NOTES
Discharge order obtained. IV removed and dressing changed. Incision appeared Clean, dry and intact. Discharge paperwork and prescriptions reviewed and signed with transport. Report called into facility. Patient denies any questions. Belongings gathered and wheeled to vehicle with patient. Patient discharged.

## 2021-05-30 NOTE — DISCHARGE INSTR - COC
Therapy:  Current Nutrition Therapy:   - Oral Diet:  General    Routes of Feeding: Oral  Liquids: No Restrictions  Daily Fluid Restriction: no  Last Modified Barium Swallow with Video (Video Swallowing Test): not done    Treatments at the Time of Hospital Discharge:   Respiratory Treatments: ***  Oxygen Therapy:  is not on home oxygen therapy. Ventilator:    - No ventilator support    Rehab Therapies: Physical Therapy and Occupational Therapy  Weight Bearing Status/Restrictions: No weight bearing restirctions  Other Medical Equipment (for information only, NOT a DME order):  walker  Other Treatments: ***    Patient's personal belongings (please select all that are sent with patient):  Az DAVE SIGNATURE:  Aston Gonzalez     CASE MANAGEMENT/SOCIAL WORK SECTION    Inpatient Status Date: 05/26/2021    Readmission Risk Assessment Score:  Readmission Risk              Risk of Unplanned Readmission:  16         Discharging to Facility/ Agency    [address]   [phone]     / signature: Electronically signed by KATHY Cooper on 5/30/21 at 1:48 PM EDT    PHYSICIAN SECTION    Prognosis: Good    Condition at Discharge: Stable    Rehab Potential (if transferring to Rehab): Good    Recommended Labs or Other Treatments After Discharge:   Recommended Follow-up, Labs or Other Treatments After Discharge:    PT/OT 50% wb  Follow up with ortho in 1-2 weeks                Physician Certification: I certify the above information and transfer of Vinita Hernadez  is necessary for the continuing treatment of the diagnosis listed and that she requires [facility] for less 30 days.      Update Admission H&P: No change in H&P    PHYSICIAN SIGNATURE:  Electronically signed by INNA Orellana CNP on 5/30/21 at 12:31 PM EDT

## 2021-05-30 NOTE — PROGRESS NOTES
Occupational Therapy  Facility/Department: [facility] C5 - MED SURG/ORTHO  Daily Treatment Note  NAME: Mariana Castillo  : 1950  MRN: [id number]    Date of Service: 2021    Discharge Recommendations:  Subacute/Skilled Nursing Facility  OT Equipment Recommendations  Equipment Needed: No    Assessment   Performance deficits / Impairments: Decreased functional mobility ; Decreased balance;Decreased ADL status; Decreased high-level IADLs;Decreased endurance  Assessment: Pt requiring assist x2 bed mobility this date and increased time d/t RLE pain with movement. Pt requiring Min A t/fs and Min A lateral steppnig standing EOB with SW with increased time and cueing for sequencing/safety awareness. Pt presenting with low BP upon standing with c/o of dizziness, pt returned to bed and left with needs within reach. Nursing notified of BP. Cont POC  Prognosis: Fair  Decision Making: Medium Complexity  OT Education: OT Role;Plan of Care;Precautions  Patient Education: RLE WB;ing and precautions  Barriers to Learning: low BP  REQUIRES OT FOLLOW UP: Yes  Activity Tolerance  Activity Tolerance: Patient Tolerated treatment well;Treatment limited secondary to medical complications (free text)  Activity Tolerance: /64 semi-supine, 122/65 EOB, 100/58 standing witg c/o of dizziness, 126/67 supine - nursing notified  Safety Devices  Safety Devices in place: Yes  Type of devices: Call light within reach;Nurse notified; All fall risk precautions in place; Left in bed;Bed alarm in place;Gait belt;Patient at risk for falls  Restraints  Initially in place: No         Patient Diagnosis(es): The encounter diagnosis was Closed fracture of right hip, initial encounter ([facility] [address] has a past medical history of CAD (coronary artery disease), Cancer ([facility] [address] [facility] Chest pain, Family history of early CAD, Hyperlipidemia, Hypertension, and Knee pain.    has a past surgical history that includes  section; Percutaneous Transluminal Coronary Angio ROM/Therapeutic Exercise  Type of ROM/Therapeutic Exercise: AROM  Comment: Pt completing x2 sets of x15 reps alternating shoulder press/chest press to increase strength and endurance                    Plan   Plan  Times per week: 4-6x/ week  Current Treatment Recommendations: Strengthening, Balance Training, Functional Mobility Training, Safety Education & Training, Self-Care / ADL, Endurance Training    AM-PAC Score        AM-PAC Inpatient Daily Activity Raw Score: 14 (05/30/21 1208)  AM-PAC Inpatient ADL T-Scale Score : 33.39 (05/30/21 1208)  ADL Inpatient CMS 0-100% Score: 59.67 (05/30/21 1208)  ADL Inpatient CMS G-Code Modifier : CK (05/30/21 1208)    Goals  Short term goals  Time Frame for Short term goals: 1 week(6-04-21)  Short term goal 1: CGA with bathroom mobility with walker by 6-02-21  Short term goal 2: mod assist of 1 with bed mobility  Short term goal 3: CGA functional/toilet transfers by 6-02-21  Short term goal 4: min assist with LE dressing with AE by 6-04-21  Short term goal 5: tolerate 15-20 reps BUE strengthening exercises - GOAL MET 5/29  Patient Goals   Patient goals : less pain to walk       Therapy Time   Individual Concurrent Group Co-treatment   Time In 1035         Time Out 1104         Minutes 29         Timed Code Treatment Minutes: 8 [facility] TIMOTEO Abebe, OTR/L

## 2021-05-30 NOTE — PROGRESS NOTES
Physical Therapy  Facility/Department: [facility] C5 - MED SURG/ORTHO  Daily Treatment Note  NAME: Bess Najjar  : 1950  MRN: [id number]    Date of Service: 2021    Discharge Recommendations:  Subacute/Skilled Nursing Facility   PT Equipment Recommendations  Equipment Needed: No  Other: defer to facility  If pt is unable to be seen after this session, please let this note serve as discharge summary. Please see case management note for discharge disposition. Thank you. Assessment   Body structures, Functions, Activity limitations: Decreased functional mobility ; Increased pain;Decreased balance;Decreased ADL status; Decreased ROM; Decreased strength;Decreased endurance  Assessment: Patient is progressing more slowly than anticipated with her PT goals. Patient continues to function below baseline following R hip fx. Patient will benefit from skilled PT to address deficits. Patient was previously indep and now needing mod assist x 2 for bed mobility, min assist to stand with a SW and min assist to ambulate up to 3 feet with SW. Upon standing the patient became dizzy her BP decreased and her HR increased. See activity tolerance section for details. Patient's RN made aware. PT again recommends that this patient receive skilled PT in the SNF setting, when medically stable, in order to address these deficits and to help her maximize her safety and independence with all functional mobility. PT to continue to follow. Treatment Diagnosis: decreased mobility  Prognosis: Good  Decision Making: Medium Complexity  PT Education: Goals; General Safety;Gait Training;Disease Specific Education;PT Role;Plan of Care;Family Education; Functional Mobility Training;Equipment;Weight-bearing Education  Patient Education: Disease Specific Education: Patient educated on weight bearing status, post-op precautions, appropriate DME, and safe mobility with AD.  Patient verbalized understanding  Barriers to Learning: none  REQUIRES PT applied;Repositioned;Rest;Ambulation/Increased Activity  Response to Pain Intervention: Patient Satisfied  Vital Signs  Patient Currently in Pain: Yes       Orientation  Orientation  Overall Orientation Status: Within Normal Limits  Cognition   Cognition  Overall Cognitive Status: WFL  Objective   Bed mobility  Supine to Sit: Moderate assistance;2 Person assistance  Sit to Supine: Moderate assistance;2 Person assistance  Scooting: Moderate assistance;2 Person assistance  Comment: head of bed elevated  Transfers  Sit to Stand: Minimal Assistance  Stand to sit: Minimal Assistance  Bed to Chair: Unable to assess  Comment: cueing for safe hand placement. patient unable to sit in chair today due to dizziness. Ambulation  Ambulation?: Yes  WB Status: 50% R LE  More Ambulation?: No  Ambulation 1  Device: Standard Walker  Assistance: Minimal assistance  Quality of Gait: side steps along the side of the bed ~3 feet to the left. min assist to help move walker. min assist to correct loss of balance. Gait Deviations: Slow Josefina;Decreased step length;Decreased step height  Distance: side steps along the side of the bed ~3 feet to the left  Comments: Returned to bed post amb due to high HR and low BP. No complaints of SOB, chest pain. RN aware of patient's dizziness. Stairs/Curb  Stairs?: No     Balance  Posture: Good  Sitting - Static: Good  Sitting - Dynamic: Good  Standing - Static: Fair  Standing - Dynamic: Poor;+  Comments: with SW  Exercises  Quad Sets: 1 x 10  Heelslides: 1 x 10 bilateral. AROM LLE, AAROM RLE with wash cloth underneath R foot  Gluteal Sets: 1 x 10  Hip Abduction: 1 x 10 bilateral. AROM LLE, AAROM RLE with wash cloth underneath R foot  Knee Long Arc Quad: 1 x 10 LLE  Ankle Pumps: 1 x 10 bilateral  Comments: cueing for sequencing and technique.   Other exercises  Other exercises?: No                        AM-PAC Score     AM-PAC Inpatient Mobility without Stair Climbing Raw Score : 9 (05/30/21 1237)  AM-PAC Inpatient without Stair Climbing T-Scale Score : 32.44 (05/30/21 1237)  Mobility Inpatient CMS 0-100% Score: 76.07 (05/30/21 1237)  Mobility Inpatient without Stair CMS G-Code Modifier : CL (05/30/21 1237)       Goals  Short term goals  Time Frame for Short term goals: 6/3  Short term goal 1: Pt will complete bed mobility wtih Min A x 1: 5/30 mod assist of 2  Short term goal 2: Pt will ambulate x 15' with RW vs SW with supervision: 5/30 3 feet with SW and min assist.  Short term goal 3: Pt will tolerate sitting in the chair x 60 minutes: 5/30 patient unable to tolerate  Short term goal 4: Pt will complete B LE exercises to improve functional mobility by 5/30: 5/29 goal MET, ongoing  Patient Goals   Patient goals : to walk with a walker to the bathroom    Plan    Plan  Times per week: 7x/week  Times per day: Daily  Current Treatment Recommendations: Strengthening, Balance Training, Functional Mobility Training, Transfer Training, Gait Training, Pain Management, Positioning, Modalities, Equipment Evaluation, Education, & procurement, Patient/Caregiver Education & Training, Safety Education & Training, Home Exercise Program  Safety Devices  Type of devices:  All fall risk precautions in place, Gait belt, Patient at risk for falls, Bed alarm in place, Call light within reach, Left in bed, Nurse notified  Restraints  Initially in place: No     Therapy Time   Individual Concurrent Group Co-treatment   Time In 1035         Time Out 1101         Minutes 26         Timed Code Treatment Minutes: 1110 Thu Chaney, PT

## 2021-05-30 NOTE — PROGRESS NOTES
Hospitalist Progress Note      PCP: Regulo Weston MD    Date of Admission: 5/26/2021    Chief Complaint:   RIGHT hip pain    Hospital Course: 78 y.o. female who presented to [facility] with acute severe RIGHT hip pain after mechanical fall. Found to have a right femoral neck fracture. Patient was admitted underwent evaluation by orthopedic surgery, underwent surgical fixation with pinning/screw. Subjective: EMR notes reviewed patient seen and examined. Pt is progressing well. No acute issues at this time     Medications:  Reviewed    Infusion Medications    sodium chloride Stopped (05/30/21 0821)    sodium chloride Stopped (05/28/21 0900)    sodium chloride       Scheduled Medications    enoxaparin  40 mg Subcutaneous Daily    aspirin  81 mg Oral Daily    atorvastatin  40 mg Oral Daily    lisinopril  2.5 mg Oral Daily    metoprolol succinate  25 mg Oral Daily    sodium chloride flush  5-40 mL Intravenous 2 times per day     PRN Meds: sodium chloride flush, sodium chloride, oxyCODONE-acetaminophen **OR** oxyCODONE-acetaminophen, morphine **OR** morphine, nitroGLYCERIN, sodium chloride flush, sodium chloride, promethazine **OR** ondansetron, polyethylene glycol, acetaminophen **OR** acetaminophen, LORazepam, cyclobenzaprine      Intake/Output Summary (Last 24 hours) at 5/30/2021 1551  Last data filed at 5/30/2021 1000  Gross per 24 hour   Intake 240 ml   Output --   Net 240 ml       Physical Exam Performed:    /72   Pulse 100   Temp 97.2 °F (36.2 °C) (Temporal)   Resp 16   Ht 5' 5\" (1.651 m)   Wt 145 lb (65.8 kg)   SpO2 97%   BMI 24.13 kg/m²     General appearance: No apparent distress, appears stated age and cooperative. HEENT: Pupils equal, round, and reactive to light. Conjunctivae/corneas clear. Neck: Supple, with full range of motion. No jugular venous distention. Trachea midline. Respiratory:  Normal respiratory effort.  Clear to auscultation, bilaterally without Rales/Wheezes/Rhonchi. Cardiovascular: Regular rate and rhythm with normal S1/S2 without murmurs, rubs or gallops. Abdomen: Soft, non-tender, non-distended with normal bowel sounds. Musculoskeletal: No clubbing, cyanosis or edema bilaterally. Hip dressing clean dry and intact  Skin: Skin color, texture, turgor normal.  No rashes or lesions. Neurologic:  Neurovascularly intact without any focal sensory/motor deficits. Cranial nerves: II-XII intact, grossly non-focal.  Psychiatric: Alert and oriented, thought content appropriate, normal insight  Capillary Refill: Brisk,3 seconds, normal   Peripheral Pulses: +2 palpable, equal bilaterally       Labs:   Recent Labs     05/28/21  0636 05/29/21  0931 05/30/21  0858   WBC 6.9 6.5 5.1   HGB 8.5* 8.2* 8.2*   HCT 25.0* 24.4* 24.6*    147 175     Recent Labs     05/29/21  0931 05/30/21  0858    136   K 5.0 4.0    102   CO2 27 28   BUN 27* 23*   CREATININE 0.9 0.7   CALCIUM 8.6 8.4     No results for input(s): AST, ALT, BILIDIR, BILITOT, ALKPHOS in the last 72 hours. No results for input(s): INR in the last 72 hours. No results for input(s): Burnice Kenedy in the last 72 hours. Urinalysis:      Lab Results   Component Value Date    NITRU Negative 05/27/2021    WBCUA 0-2 05/27/2021    BACTERIA see below 05/08/2016    RBCUA 3-4 05/27/2021    BLOODU TRACE-INTACT 05/27/2021    SPECGRAV 1.025 05/27/2021    GLUCOSEU Negative 05/27/2021       Radiology:  XR FEMUR RIGHT (MIN 2 VIEWS)   Final Result   Intraprocedural fluoroscopic spot images as above. See separate procedure   report for more information. XR HIP RIGHT (2-3 VIEWS)   Final Result   Intertrochanteric fracture right femoral neck         XR CHEST PORTABLE   Final Result   No acute process by radiograph.          FLUORO FOR SURGICAL PROCEDURES    (Results Pending)           Assessment/Plan:    Active Hospital Problems    Diagnosis     Hip fracture ([facility] [address] [S72.009A]     Anemia [D64.9]

## 2021-06-13 NOTE — DISCHARGE SUMMARY
distention. Trachea midline. Respiratory:  Normal respiratory effort. Clear to auscultation, bilaterally without Rales/Wheezes/Rhonchi. Cardiovascular:  Regular rate and rhythm with normal S1/S2 without murmurs, rubs or gallops. Abdomen: Soft, non-tender, non-distended with normal bowel sounds. Musculoskeletal:  No clubbing, cyanosis or edema bilaterally. Full range of motion without deformity. Skin: Skin color, texture, turgor normal.  No rashes or lesions. Neurologic:  Neurovascularly intact without any focal sensory/motor deficits. Cranial nerves: II-XII intact, grossly non-focal.  Psychiatric:  Alert and oriented, thought content appropriate, normal insight  Capillary Refill: Brisk,< 3 seconds   Peripheral Pulses: +2 palpable, equal bilaterally       Labs: For convenience and continuity at follow-up the following most recent labs are provided:      CBC:    Lab Results   Component Value Date    WBC 5.1 05/30/2021    HGB 8.2 05/30/2021    HCT 24.6 05/30/2021     05/30/2021       Renal:    Lab Results   Component Value Date     05/30/2021    K 4.0 05/30/2021     05/30/2021    CO2 28 05/30/2021    BUN 23 05/30/2021    CREATININE 0.7 05/30/2021    CALCIUM 8.4 05/30/2021    PHOS 3.5 05/27/2021         Significant Diagnostic Studies    Radiology:   XR FEMUR RIGHT (MIN 2 VIEWS)   Final Result   Intraprocedural fluoroscopic spot images as above. See separate procedure   report for more information. XR HIP RIGHT (2-3 VIEWS)   Final Result   Intertrochanteric fracture right femoral neck         XR CHEST PORTABLE   Final Result   No acute process by radiograph.          FLUORO FOR SURGICAL PROCEDURES    (Results Pending)          Consults:     IP CONSULT TO HOSPITALIST  IP CONSULT TO ORTHOPEDIC SURGERY  IP CONSULT TO SOCIAL WORK    Disposition:  SNF    Condition at Discharge: Stable    Discharge Instructions/Follow-up:  Ortho    Code Status:  Prior     Activity: activity as tolerated    Diet: regular diet      Discharge Medications:     Discharge Medication List as of 5/30/2021  3:24 PM           Details   oxyCODONE-acetaminophen (PERCOCET) 5-325 MG per tablet Take 1 tablet by mouth every 4 hours as needed for Pain for up to 7 days. , Disp-10 tablet, R-0Print      enoxaparin (LOVENOX) 40 MG/0.4ML injection Inject 0.4 mLs into the skin daily for 28 days, Disp-28 Syringe, R-0Print      polyethylene glycol (GLYCOLAX) 17 g packet Take 17 g by mouth daily as needed for Constipation, Disp-527 g, R-1Print              Details   aspirin 81 MG chewable tablet Take 81 mg by mouth dailyHistorical Med      lisinopril (PRINIVIL;ZESTRIL) 2.5 MG tablet Take 2.5 mg by mouth dailyHistorical Med      metoprolol succinate (TOPROL XL) 25 MG extended release tablet Take 25 mg by mouth dailyHistorical Med      atorvastatin (LIPITOR) 40 MG tablet TAKE 1 TABLET BY MOUTH ONE TIME A DAY , Disp-90 tablet, R-2Normal      nitroGLYCERIN (NITROSTAT) 0.4 MG SL tablet Place 1 tablet under the tongue every 5 minutes as needed for Chest pain, Disp-25 tablet, R-1Normal      Coenzyme Q10 (CO Q 10 PO) Take by mouth             Time Spent on discharge is more than 30 minutes in the examination, evaluation, counseling and review of medications and discharge plan. Signed:    INNA Mcdowell CNP   6/13/2021      Thank you Kash Babin MD for the opportunity to be involved in this patient's care. If you have any questions or concerns please feel free to contact me at [phone].

## 2021-08-27 ENCOUNTER — HOSPITAL ENCOUNTER (OUTPATIENT)
Dept: CT IMAGING | Age: 71
Discharge: HOME | End: 2021-08-27
Payer: MEDICARE

## 2021-08-27 DIAGNOSIS — C64.1 RENAL CELL CARCINOMA, RIGHT (HCC): ICD-10-CM

## 2021-08-27 PROCEDURE — 74176 CT ABD & PELVIS W/O CONTRAST: CPT

## 2021-11-02 ENCOUNTER — OFFICE VISIT (OUTPATIENT)
Dept: CARDIOLOGY CLINIC | Age: 71
End: 2021-11-02
Payer: MEDICARE

## 2021-11-02 VITALS
WEIGHT: 142 LBS | BODY MASS INDEX: 23.66 KG/M2 | SYSTOLIC BLOOD PRESSURE: 124 MMHG | HEIGHT: 65 IN | OXYGEN SATURATION: 100 % | HEART RATE: 68 BPM | DIASTOLIC BLOOD PRESSURE: 60 MMHG

## 2021-11-02 DIAGNOSIS — Z98.61 S/P PTCA (PERCUTANEOUS TRANSLUMINAL CORONARY ANGIOPLASTY): ICD-10-CM

## 2021-11-02 DIAGNOSIS — I25.10 CORONARY ARTERY DISEASE INVOLVING NATIVE CORONARY ARTERY OF NATIVE HEART WITHOUT ANGINA PECTORIS: ICD-10-CM

## 2021-11-02 DIAGNOSIS — I25.5 ISCHEMIC CARDIOMYOPATHY: Primary | ICD-10-CM

## 2021-11-02 DIAGNOSIS — I10 PRIMARY HYPERTENSION: ICD-10-CM

## 2021-11-02 PROCEDURE — G8427 DOCREV CUR MEDS BY ELIG CLIN: HCPCS | Performed by: INTERNAL MEDICINE

## 2021-11-02 PROCEDURE — 3017F COLORECTAL CA SCREEN DOC REV: CPT | Performed by: INTERNAL MEDICINE

## 2021-11-02 PROCEDURE — 4040F PNEUMOC VAC/ADMIN/RCVD: CPT | Performed by: INTERNAL MEDICINE

## 2021-11-02 PROCEDURE — G8400 PT W/DXA NO RESULTS DOC: HCPCS | Performed by: INTERNAL MEDICINE

## 2021-11-02 PROCEDURE — 99213 OFFICE O/P EST LOW 20 MIN: CPT | Performed by: INTERNAL MEDICINE

## 2021-11-02 PROCEDURE — 1123F ACP DISCUSS/DSCN MKR DOCD: CPT | Performed by: INTERNAL MEDICINE

## 2021-11-02 PROCEDURE — 1090F PRES/ABSN URINE INCON ASSESS: CPT | Performed by: INTERNAL MEDICINE

## 2021-11-02 PROCEDURE — G8420 CALC BMI NORM PARAMETERS: HCPCS | Performed by: INTERNAL MEDICINE

## 2021-11-02 PROCEDURE — 1036F TOBACCO NON-USER: CPT | Performed by: INTERNAL MEDICINE

## 2021-11-02 PROCEDURE — G8484 FLU IMMUNIZE NO ADMIN: HCPCS | Performed by: INTERNAL MEDICINE

## 2021-11-02 RX ORDER — LISINOPRIL 2.5 MG/1
2.5 TABLET ORAL DAILY
Qty: 90 TABLET | Refills: 3 | Status: SHIPPED | OUTPATIENT
Start: 2021-11-02

## 2021-11-02 RX ORDER — METOPROLOL SUCCINATE 25 MG/1
25 TABLET, EXTENDED RELEASE ORAL DAILY
Qty: 90 TABLET | Refills: 3 | Status: SHIPPED | OUTPATIENT
Start: 2021-11-02

## 2021-11-02 NOTE — PROGRESS NOTES
[address] [facility]   Cardiovascular Evaluation    PATIENT: Bunny Olea  DATE: 2021  MRN: <[id number]>  CSN: [id number]  : 1950    Primary Care Doctor: Jorge Tenorio MD    Reason for evaluation/Chief complaint:   1 Year Follow Up, Cardiomyopathy, and Coronary Artery Disease      Subjective:    History of present illness on initial date of evaluation:   Bunny Olea is a 73 y.o. patient who presents for follow up. Since her last office visit she had a fall in 2021 that resulted in a right femur fracture that required surgical intervention. Today she states she was helping care for her  who suffered with a spinal cord injury from a fall. She states she fell out of a van. Patient currently denies any weight gain, edema, palpitations, chest pain, shortness of breath, dizziness, and syncope. She is taking all medications as prescribed tolerating well. She is a retired dietician. Patient Active Problem List   Diagnosis    Localized osteoarthrosis, lower leg    Chondromalacia of patella    Knee pain    HLD (hyperlipidemia)    Tear of lateral cartilage or meniscus of knee, current    Chest pain    CAD (coronary artery disease)    HTN (hypertension)    S/P PTCA (percutaneous transluminal coronary angioplasty)    Urinary retention    Hematuria    Acute blood loss anemia    Renal mass    Renal cell carcinoma of right kidney (HCC)    Iron deficiency anemia due to chronic blood loss    Tibia/fibula fracture    Tibia/fibula fracture, right, closed, with routine healing, subsequent encounter    Ischemic cardiomyopathy, prior to stenting 45%, resolved.  Hip fracture (HCC)    Anemia    Hip fracture, right, closed, initial encounter [facility])         Cardiac Testing: I have reviewed the findings below.   EKG:  ECHO:   STRESS TEST:  CATH:  BYPASS:  VASCULAR:    Past Medical History:   has a past medical history of CAD (coronary artery disease), Cancer ([facility] [address] Chest pain, Family history of early CAD, Hyperlipidemia, Hypertension, and Knee pain. Surgical History:   has a past surgical history that includes  section; Percutaneous Transluminal Coronary Angio (2015); eye surgery; Coronary angioplasty with stent (2015); Cardiac surgery; total nephrectomy (Right, 16); and Femur fracture surgery (Right, 2021). Social History:   reports that she has never smoked. She has never used smokeless tobacco. She reports current alcohol use. She reports that she does not use drugs. Family History:  No evidence for sudden cardiac death or premature CAD    Medications:  Reviewed and are listed in nursing record. and/or listed below  Outpatient Medications:  Prior to Admission medications    Medication Sig Start Date End Date Taking? Authorizing Provider   Cholecalciferol (VITAMIN D3 PO) Take by mouth   Yes Historical Provider, MD   lisinopril (PRINIVIL;ZESTRIL) 2.5 MG tablet Take 1 tablet by mouth daily 21  Yes Adryan Will MD   metoprolol succinate (TOPROL XL) 25 MG extended release tablet Take 1 tablet by mouth daily 21  Yes Adryan Will MD   aspirin 81 MG chewable tablet Take 81 mg by mouth daily   Yes Historical Provider, MD   atorvastatin (LIPITOR) 40 MG tablet TAKE 1 TABLET BY MOUTH ONE TIME A DAY  3/22/21  Yes Mario Jenkins MD   nitroGLYCERIN (NITROSTAT) 0.4 MG SL tablet Place 1 tablet under the tongue every 5 minutes as needed for Chest pain 10/22/18  Yes Adryan Will MD   Coenzyme Q10 (CO Q 10 PO) Take by mouth   Yes Historical Provider, MD       In-patient schedule medications:        Infusion Medications: Allergies:  Neomycin-bacitracin zn-polymyx, Neosporin [bacitracin-neomycin-polymyxin], and Neomycin     Review of Systems:   All 14 point review of symptoms completed. Pertinent positives identified in the HPI, all other review of symptoms findings as below.      Review of Systems - History obtained from the patient  General ROS: negative for - chills, fever or night sweats  Psychological ROS: negative for - disorientation or hallucinations  Ophthalmic ROS: negative for - dry eyes, eye pain or loss of vision  ENT ROS: negative for - nasal discharge or sore throat  Allergy and Immunology ROS: negative for - hives or itchy/watery eyes  Hematological and Lymphatic ROS: negative for - jaundice or night sweats  Endocrine ROS: negative for - mood swings or temperature intolerance  Breast ROS: deferred  Respiratory ROS: negative for - hemoptysis or stridor  Cardiovascular ROS: negative for - chest pain, dyspnea on exertion or palpitations  Gastrointestinal ROS: no abdominal pain, change in bowel habits, or black or bloody stools  Genito-Urinary ROS: no dysuria, trouble voiding, or hematuria  Musculoskeletal ROS: negative for - gait disturbance, joint pain or joint stiffness  Neurological ROS: negative for - seizures or speech problems  Dermatological ROS: negative for - rash or skin lesion changes      Physical Examination:    /60   Pulse 68   Ht 5' 5\" (1.651 m)   Wt 142 lb (64.4 kg)   SpO2 100%   BMI 23.63 kg/m²   /60   Pulse 68   Ht 5' 5\" (1.651 m)   Wt 142 lb (64.4 kg)   SpO2 100%   BMI 23.63 kg/m²    Weight: 142 lb (64.4 kg)     Wt Readings from Last 3 Encounters:   11/02/21 142 lb (64.4 kg)   05/26/21 145 lb (65.8 kg)   10/26/20 150 lb 8 oz (68.3 kg)     No intake or output data in the 24 hours ending 11/02/21 1638    General Appearance:  Alert, cooperative, no distress, appears stated age   Head:  Normocephalic, without obvious abnormality, atraumatic   Eyes:  PERRL, conjunctiva/corneas clear       Nose: Nares normal, no drainage or sinus tenderness   Throat: Lips, mucosa, and tongue normal   Neck: Supple, symmetrical, trachea midline, no adenopathy, thyroid: not enlarged, symmetric, no tenderness/mass/nodules, no carotid bruit or JVD       Lungs:   Clear to auscultation bilaterally, respirations unlabored   Chest Wall:  No tenderness or deformity   Heart:  Regular rhythm and normal rate; S1, S2 are normal; no murmur noted; no rub or gallop   Abdomen:   Soft, non-tender, bowel sounds active all four quadrants,  no masses, no organomegaly           Extremities: Extremities normal, atraumatic, no cyanosis or edema   Pulses: 2+ and symmetric   Skin: Skin color, texture, turgor normal, no rashes or lesions   Pysch: Normal mood and affect   Neurologic: Normal gross motor and sensory exam.         Labs  No results for input(s): WBC, HGB, HCT, MCV, PLT in the last 72 hours. No results for input(s): CREATININE, BUN, NA, K, CL, CO2 in the last 72 hours. No results for input(s): INR, PROTIME in the last 72 hours. No results for input(s): TROPONINI in the last 72 hours. Invalid input(s): PRO-BNP  No results for input(s): CHOL, HDL in the last 72 hours. Invalid input(s): LDL, TG      Imaging:  I have reviewed the below testing personally and my interpretation is below. EKG:  CXR:      Assessment:  73 y.o. patient with:  Problem List Items Addressed This Visit     CAD (coronary artery disease)    Relevant Medications    lisinopril (PRINIVIL;ZESTRIL) 2.5 MG tablet    metoprolol succinate (TOPROL XL) 25 MG extended release tablet    HTN (hypertension)    Relevant Medications    lisinopril (PRINIVIL;ZESTRIL) 2.5 MG tablet    metoprolol succinate (TOPROL XL) 25 MG extended release tablet    S/P PTCA (percutaneous transluminal coronary angioplasty)    Ischemic cardiomyopathy, prior to stenting 45%, resolved. - Primary          Plan:  1. Patient given detailed instructions on addressing diet, regular exercise, weight control, smoking abstention, medication compliance, and stress minimization. The patient was provided written and verbal instructions regarding risk factor modification. 2. Follow up in office in one year. Scribe's attestation:   This note was scribed in the presence of Jasvir Jaimes by Maritza Cuevas RN     I, Dr. Brooke Molina, personally performed the services described in this documentation, as scribed by the above signed scribe in my presence. It is both accurate and complete to my knowledge. I agree with the details independently gathered by the clinical support staff, while the remaining scribed note accurately describes my personal service to the patient. Medical Decision Making: The following items were considered in medical decision making:  Independent review of images  Review / order clinical lab tests  Review / order radiology tests  Decision to obtain old records  Review and summation of old records as accessed through [employer] (a summary of my findings in these old records)      Time Based Itemization  A total of 20 minutes was spent on today's patient encounter. If applicable, non-patient-facing activities:  (X)Preparing to see the patient and reviewing records  (X) Individual interpretation of results  ( ) Discussion or coordination of care with other health care professionals  () Ordering of unique tests, medications, or procedures  (X) Documentation within the EHR               All questions and concerns were addressed to the patient/family. Alternatives to my treatment were discussed. The note was completed using EMR. Every effort was made to ensure accuracy; however, inadvertent computerized transcription errors may be present.     Brooke Molina MD, [address], [address] [facility]  [phone] [place] office  [phone] [facility]  11/2/2021  4:38 PM

## 2021-11-02 NOTE — LETTER
Bunny Olea  1950      [address]   Cardiovascular Evaluation    PATIENT: Bunny Olea  DATE: 2021  MRN: <[id number]>  CSN: [id number]  : 1950    Primary Care Doctor: Jorge Tenorio MD    Reason for evaluation/Chief complaint:   1 Year Follow Up, Cardiomyopathy, and Coronary Artery Disease      Subjective:    History of present illness on initial date of evaluation:   Bunny Olea is a 73 y.o. patient who presents for follow up. Since her last office visit she had a fall in 2021 that resulted in a right femur fracture that required surgical intervention. Today she states she was helping care for her  who suffered with a spinal cord injury from a fall. She states she fell out of a van. Patient currently denies any weight gain, edema, palpitations, chest pain, shortness of breath, dizziness, and syncope. She is taking all medications as prescribed tolerating well. She is a retired dietician. Patient Active Problem List   Diagnosis    Localized osteoarthrosis, lower leg    Chondromalacia of patella    Knee pain    HLD (hyperlipidemia)    Tear of lateral cartilage or meniscus of knee, current    Chest pain    CAD (coronary artery disease)    HTN (hypertension)    S/P PTCA (percutaneous transluminal coronary angioplasty)    Urinary retention    Hematuria    Acute blood loss anemia    Renal mass    Renal cell carcinoma of right kidney (HCC)    Iron deficiency anemia due to chronic blood loss    Tibia/fibula fracture    Tibia/fibula fracture, right, closed, with routine healing, subsequent encounter    Ischemic cardiomyopathy, prior to stenting 45%, resolved.  Hip fracture (HCC)    Anemia    Hip fracture, right, closed, initial encounter [facility])         Cardiac Testing: I have reviewed the findings below.   EKG:  ECHO:   STRESS TEST:  CATH:  BYPASS:  VASCULAR:    Past Medical History:   has a past medical history of CAD (coronary artery disease), Cancer [facility]), Chest pain, Family history of early CAD, Hyperlipidemia, Hypertension, and Knee pain. Surgical History:   has a past surgical history that includes  section; Percutaneous Transluminal Coronary Angio (2015); eye surgery; Coronary angioplasty with stent (2015); Cardiac surgery; total nephrectomy (Right, 16); and Femur fracture surgery (Right, 2021). Social History:   reports that she has never smoked. She has never used smokeless tobacco. She reports current alcohol use. She reports that she does not use drugs. Family History:  No evidence for sudden cardiac death or premature CAD    Medications:  Reviewed and are listed in nursing record. and/or listed below  Outpatient Medications:  Prior to Admission medications    Medication Sig Start Date End Date Taking? Authorizing Provider   Cholecalciferol (VITAMIN D3 PO) Take by mouth   Yes Historical Provider, MD   lisinopril (PRINIVIL;ZESTRIL) 2.5 MG tablet Take 1 tablet by mouth daily 21  Yes Bonnie Grissom MD   metoprolol succinate (TOPROL XL) 25 MG extended release tablet Take 1 tablet by mouth daily 21  Yes Bonnie Grissom MD   aspirin 81 MG chewable tablet Take 81 mg by mouth daily   Yes Historical Provider, MD   atorvastatin (LIPITOR) 40 MG tablet TAKE 1 TABLET BY MOUTH ONE TIME A DAY  3/22/21  Yes Dhruv Troncoso MD   nitroGLYCERIN (NITROSTAT) 0.4 MG SL tablet Place 1 tablet under the tongue every 5 minutes as needed for Chest pain 10/22/18  Yes Bonnie Grissom MD   Coenzyme Q10 (CO Q 10 PO) Take by mouth   Yes Historical Provider, MD       In-patient schedule medications:        Infusion Medications: Allergies:  Neomycin-bacitracin zn-polymyx, Neosporin [bacitracin-neomycin-polymyxin], and Neomycin     Review of Systems:   All 14 point review of symptoms completed. Pertinent positives identified in the HPI, all other review of symptoms findings as below.      Review of Systems - History obtained from the patient  General ROS: negative for - chills, fever or night sweats  Psychological ROS: negative for - disorientation or hallucinations  Ophthalmic ROS: negative for - dry eyes, eye pain or loss of vision  ENT ROS: negative for - nasal discharge or sore throat  Allergy and Immunology ROS: negative for - hives or itchy/watery eyes  Hematological and Lymphatic ROS: negative for - jaundice or night sweats  Endocrine ROS: negative for - mood swings or temperature intolerance  Breast ROS: deferred  Respiratory ROS: negative for - hemoptysis or stridor  Cardiovascular ROS: negative for - chest pain, dyspnea on exertion or palpitations  Gastrointestinal ROS: no abdominal pain, change in bowel habits, or black or bloody stools  Genito-Urinary ROS: no dysuria, trouble voiding, or hematuria  Musculoskeletal ROS: negative for - gait disturbance, joint pain or joint stiffness  Neurological ROS: negative for - seizures or speech problems  Dermatological ROS: negative for - rash or skin lesion changes      Physical Examination:    /60   Pulse 68   Ht 5' 5\" (1.651 m)   Wt 142 lb (64.4 kg)   SpO2 100%   BMI 23.63 kg/m²   /60   Pulse 68   Ht 5' 5\" (1.651 m)   Wt 142 lb (64.4 kg)   SpO2 100%   BMI 23.63 kg/m²    Weight: 142 lb (64.4 kg)     Wt Readings from Last 3 Encounters:   11/02/21 142 lb (64.4 kg)   05/26/21 145 lb (65.8 kg)   10/26/20 150 lb 8 oz (68.3 kg)     No intake or output data in the 24 hours ending 11/02/21 1638    General Appearance:  Alert, cooperative, no distress, appears stated age   Head:  Normocephalic, without obvious abnormality, atraumatic   Eyes:  PERRL, conjunctiva/corneas clear       Nose: Nares normal, no drainage or sinus tenderness   Throat: Lips, mucosa, and tongue normal   Neck: Supple, symmetrical, trachea midline, no adenopathy, thyroid: not enlarged, symmetric, no tenderness/mass/nodules, no carotid bruit or JVD       Lungs:   Clear to auscultation bilaterally, respirations unlabored   Chest Wall:  No tenderness or deformity   Heart:  Regular rhythm and normal rate; S1, S2 are normal; no murmur noted; no rub or gallop   Abdomen:   Soft, non-tender, bowel sounds active all four quadrants,  no masses, no organomegaly           Extremities: Extremities normal, atraumatic, no cyanosis or edema   Pulses: 2+ and symmetric   Skin: Skin color, texture, turgor normal, no rashes or lesions   Pysch: Normal mood and affect   Neurologic: Normal gross motor and sensory exam.         Labs  No results for input(s): WBC, HGB, HCT, MCV, PLT in the last 72 hours. No results for input(s): CREATININE, BUN, NA, K, CL, CO2 in the last 72 hours. No results for input(s): INR, PROTIME in the last 72 hours. No results for input(s): TROPONINI in the last 72 hours. Invalid input(s): PRO-BNP  No results for input(s): CHOL, HDL in the last 72 hours. Invalid input(s): LDL, TG      Imaging:  I have reviewed the below testing personally and my interpretation is below. EKG:  CXR:      Assessment:  73 y.o. patient with:  Problem List Items Addressed This Visit     CAD (coronary artery disease)    Relevant Medications    lisinopril (PRINIVIL;ZESTRIL) 2.5 MG tablet    metoprolol succinate (TOPROL XL) 25 MG extended release tablet    HTN (hypertension)    Relevant Medications    lisinopril (PRINIVIL;ZESTRIL) 2.5 MG tablet    metoprolol succinate (TOPROL XL) 25 MG extended release tablet    S/P PTCA (percutaneous transluminal coronary angioplasty)    Ischemic cardiomyopathy, prior to stenting 45%, resolved. - Primary          Plan:  1. Patient given detailed instructions on addressing diet, regular exercise, weight control, smoking abstention, medication compliance, and stress minimization. The patient was provided written and verbal instructions regarding risk factor modification. 2. Follow up in office in one year. Scribe's attestation:   This note was scribed in the presence of Isreal Ramsey by Royal Yuan RN     I, Dr. Je Kong, personally performed the services described in this documentation, as scribed by the above signed scribe in my presence. It is both accurate and complete to my knowledge. I agree with the details independently gathered by the clinical support staff, while the remaining scribed note accurately describes my personal service to the patient. Medical Decision Making: The following items were considered in medical decision making:  Independent review of images  Review / order clinical lab tests  Review / order radiology tests  Decision to obtain old records  Review and summation of old records as accessed through [facility] (a summary of my findings in these old records)      Time Based Itemization  A total of 20 minutes was spent on today's patient encounter. If applicable, non-patient-facing activities:  (X)Preparing to see the patient and reviewing records  (X) Individual interpretation of results  ( ) Discussion or coordination of care with other health care professionals  () Ordering of unique tests, medications, or procedures  (X) Documentation within the EHR               All questions and concerns were addressed to the patient/family. Alternatives to my treatment were discussed. The note was completed using EMR. Every effort was made to ensure accuracy; however, inadvertent computerized transcription errors may be present.     Je Kong MD, [address], [facility]  [phone] [facility]  [phone] [facility]  11/2/2021  4:38 PM

## 2021-11-02 NOTE — PATIENT INSTRUCTIONS
Plan:  1. Patient given detailed instructions on addressing diet, regular exercise, weight control, smoking abstention, medication compliance, and stress minimization. The patient was provided written and verbal instructions regarding risk factor modification. 2. Follow up in office in one year.

## 2023-02-09 ENCOUNTER — OFFICE VISIT (OUTPATIENT)
Dept: CARDIOLOGY CLINIC | Age: 73
End: 2023-02-09
Payer: MEDICARE

## 2023-02-09 VITALS
WEIGHT: 145.5 LBS | DIASTOLIC BLOOD PRESSURE: 82 MMHG | OXYGEN SATURATION: 96 % | HEART RATE: 79 BPM | BODY MASS INDEX: 24.21 KG/M2 | SYSTOLIC BLOOD PRESSURE: 122 MMHG

## 2023-02-09 DIAGNOSIS — I25.10 CORONARY ARTERY DISEASE INVOLVING NATIVE CORONARY ARTERY OF NATIVE HEART WITHOUT ANGINA PECTORIS: Primary | ICD-10-CM

## 2023-02-09 DIAGNOSIS — I10 PRIMARY HYPERTENSION: ICD-10-CM

## 2023-02-09 DIAGNOSIS — Z98.61 S/P PTCA (PERCUTANEOUS TRANSLUMINAL CORONARY ANGIOPLASTY): ICD-10-CM

## 2023-02-09 DIAGNOSIS — E78.49 OTHER HYPERLIPIDEMIA: ICD-10-CM

## 2023-02-09 PROCEDURE — 1036F TOBACCO NON-USER: CPT | Performed by: INTERNAL MEDICINE

## 2023-02-09 PROCEDURE — 3074F SYST BP LT 130 MM HG: CPT | Performed by: INTERNAL MEDICINE

## 2023-02-09 PROCEDURE — 99214 OFFICE O/P EST MOD 30 MIN: CPT | Performed by: INTERNAL MEDICINE

## 2023-02-09 PROCEDURE — G8484 FLU IMMUNIZE NO ADMIN: HCPCS | Performed by: INTERNAL MEDICINE

## 2023-02-09 PROCEDURE — G8427 DOCREV CUR MEDS BY ELIG CLIN: HCPCS | Performed by: INTERNAL MEDICINE

## 2023-02-09 PROCEDURE — 3079F DIAST BP 80-89 MM HG: CPT | Performed by: INTERNAL MEDICINE

## 2023-02-09 PROCEDURE — G8420 CALC BMI NORM PARAMETERS: HCPCS | Performed by: INTERNAL MEDICINE

## 2023-02-09 PROCEDURE — 1123F ACP DISCUSS/DSCN MKR DOCD: CPT | Performed by: INTERNAL MEDICINE

## 2023-02-09 PROCEDURE — 3017F COLORECTAL CA SCREEN DOC REV: CPT | Performed by: INTERNAL MEDICINE

## 2023-02-09 PROCEDURE — G8400 PT W/DXA NO RESULTS DOC: HCPCS | Performed by: INTERNAL MEDICINE

## 2023-02-09 PROCEDURE — 1090F PRES/ABSN URINE INCON ASSESS: CPT | Performed by: INTERNAL MEDICINE

## 2023-02-09 RX ORDER — NITROGLYCERIN 0.4 MG/1
0.4 TABLET SUBLINGUAL EVERY 5 MIN PRN
Qty: 25 TABLET | Refills: 3 | Status: SHIPPED | OUTPATIENT
Start: 2023-02-09

## 2023-02-09 NOTE — PATIENT INSTRUCTIONS
Your provider has ordered testing for further evaluation. An order/prescription has been included in your paper work. To schedule outpatient testing, contact Central Scheduling by calling [facility] ([phone]). Plan:  I recommend that the patient continue their currently prescribed medications. Their drug modifiable risk factors appear to be well controlled. I will continue to address the need/dosing of medications in future visits. 2.   Continue to follow with primary care provider Arabella Sandoval MD   3. Order echocardiogram ~ evaluate heart strength   ~A test that records movement of your heart valves and chambers by ultrasound. Evaluates heart valves, any chamber enlargement, abnormal openings, or any fluid in the sac surrounding the heart. 4.    Discussed stress test if echocardiogram is abnormal   5.  Follow up in one year if testing normal.

## 2023-02-09 NOTE — PROGRESS NOTES
[address] [facility]   Cardiovascular Evaluation    PATIENT: Sma Jaeger  DATE: 2023  MRN: [id number]  CSN: [id number]  : 1950    Primary Care Doctor: Tomer Thacker MD    Reason for evaluation/Chief complaint:   Follow-up and Coronary Artery Disease      Subjective:    History of present illness on initial date of evaluation:   Sam Jaeger is a 81 y.o. patient who presents for follow up. She has a past medical history including coronary artery disease, hypertension, and ischemic cardiomyopathy. She presented to the emergency department 2015 with chest pain. She underwent stress test 2015 mixed ischemia and scar shown in apex and adjacent anteroseptal segment. Therefore she underwent left heart catheterization 2015 with PCI to LAD with HERNANDO and PCI to Diag 2 w/ballon PTCA. Her echocardiogram 10/15/2015 showed EF 55-60% and reversal of E/A inflow velocities across the mitral valve suggesting impaired left ventricular relaxation. She had a fall in 2021 that resulted in a right femur fracture that required surgical intervention. Today she states she is doing well. Patient currently denies any weight gain, edema, palpitations, chest pain, shortness of breath, dizziness, and syncope. She is taking medications as prescribed, tolerating well. She reports she is taking care of her  who is limited on physical abilities due to fall accident at home. She states she walks regularly she does not have limitations noted. She reports she is not walking as frequently as prior to  accident.      Patient Active Problem List   Diagnosis    Localized osteoarthrosis, lower leg    Chondromalacia of patella    Knee pain    HLD (hyperlipidemia)    Tear of lateral cartilage or meniscus of knee, current    Chest pain    CAD (coronary artery disease)    HTN (hypertension)    S/P PTCA (percutaneous transluminal coronary angioplasty)    Urinary retention    Hematuria Acute blood loss anemia    Renal mass    Renal cell carcinoma of right kidney (HCC)    Iron deficiency anemia due to chronic blood loss    Tibia/fibula fracture    Tibia/fibula fracture, right, closed, with routine healing, subsequent encounter    Ischemic cardiomyopathy, prior to stenting 45%, resolved. Hip fracture (HCC)    Anemia    Hip fracture, right, closed, initial encounter [facility])         Cardiac Testing: I have reviewed the findings below. EKG:  ECHO:   STRESS TEST:  CATH:  BYPASS:  VASCULAR:    Past Medical History:   has a past medical history of CAD (coronary artery disease), Cancer ([address] Chest pain, Family history of early CAD, Hyperlipidemia, Hypertension, and Knee pain. Surgical History:   has a past surgical history that includes  section; Percutaneous Transluminal Coronary Angio (2015); eye surgery; Coronary angioplasty with stent (2015); Cardiac surgery; total nephrectomy (Right, 16); and Femur fracture surgery (Right, 2021). Social History:   reports that she has never smoked. She has never used smokeless tobacco. She reports current alcohol use. She reports that she does not use drugs. Family History:  No evidence for sudden cardiac death or premature CAD    Medications:  Reviewed and are listed in nursing record. and/or listed below  Outpatient Medications:  Prior to Admission medications    Medication Sig Start Date End Date Taking?  Authorizing Provider   nitroGLYCERIN (NITROSTAT) 0.4 MG SL tablet Place 1 tablet under the tongue every 5 minutes as needed for Chest pain 23  Yes Mary Nogueira MD   Cholecalciferol (VITAMIN D3 PO) Take by mouth   Yes Historical Provider, MD   lisinopril (PRINIVIL;ZESTRIL) 2.5 MG tablet Take 1 tablet by mouth daily 21  Yes Mary Nogueira MD   metoprolol succinate (TOPROL XL) 25 MG extended release tablet Take 1 tablet by mouth daily 21  Yes Mary Nogueira MD   aspirin 81 MG chewable tablet Take 81 mg by mouth daily   Yes Historical Provider, MD   atorvastatin (LIPITOR) 40 MG tablet TAKE 1 TABLET BY MOUTH ONE TIME A DAY  3/22/21  Yes Mohsen Conti MD   Coenzyme Q10 (CO Q 10 PO) Take by mouth   Yes Historical Provider, MD       In-patient schedule medications:        Infusion Medications: Allergies:  Neomycin-bacitracin zn-polymyx, Neosporin [bacitracin-neomycin-polymyxin], and Neomycin     Review of Systems:   All 14 point review of symptoms completed. Pertinent positives identified in the HPI, all other review of symptoms findings as below.      Review of Systems - History obtained from the patient  General ROS: negative for - chills, fever or night sweats  Psychological ROS: negative for - disorientation or hallucinations  Ophthalmic ROS: negative for - dry eyes, eye pain or loss of vision  ENT ROS: negative for - nasal discharge or sore throat  Allergy and Immunology ROS: negative for - hives or itchy/watery eyes  Hematological and Lymphatic ROS: negative for - jaundice or night sweats  Endocrine ROS: negative for - mood swings or temperature intolerance  Breast ROS: deferred  Respiratory ROS: negative for - hemoptysis or stridor  Cardiovascular ROS: negative for - chest pain, dyspnea on exertion or palpitations  Gastrointestinal ROS: no abdominal pain, change in bowel habits, or black or bloody stools  Genito-Urinary ROS: no dysuria, trouble voiding, or hematuria  Musculoskeletal ROS: negative for - gait disturbance, joint pain or joint stiffness  Neurological ROS: negative for - seizures or speech problems  Dermatological ROS: negative for - rash or skin lesion changes      Physical Examination:    /82 (Site: Right Upper Arm, Position: Sitting)   Pulse 79   Wt 145 lb 8 oz (66 kg)   SpO2 96%   BMI 24.21 kg/m²   /82 (Site: Right Upper Arm, Position: Sitting)   Pulse 79   Wt 145 lb 8 oz (66 kg)   SpO2 96%   BMI 24.21 kg/m²    Weight: 145 lb 8 oz (66 kg)     Wt Readings from Last 3 Encounters:   02/09/23 145 lb 8 oz (66 kg)   11/02/21 142 lb (64.4 kg)   05/26/21 145 lb (65.8 kg)     No intake or output data in the 24 hours ending 02/09/23 1514    General Appearance:  Alert, cooperative, no distress, appears stated age   Head:  Normocephalic, without obvious abnormality, atraumatic   Eyes:  PERRL, conjunctiva/corneas clear       Nose: Nares normal, no drainage or sinus tenderness   Throat: Lips, mucosa, and tongue normal   Neck: Supple, symmetrical, trachea midline, no adenopathy, thyroid: not enlarged, symmetric, no tenderness/mass/nodules, no carotid bruit or JVD       Lungs:   Clear to auscultation bilaterally, respirations unlabored   Chest Wall:  No tenderness or deformity   Heart:  Regular rhythm and normal rate; S1, S2 are normal; no murmur noted; no rub or gallop   Abdomen:   Soft, non-tender, bowel sounds active all four quadrants,  no masses, no organomegaly           Extremities: Extremities normal, atraumatic, no cyanosis or edema   Pulses: 2+ and symmetric   Skin: Skin color, texture, turgor normal, no rashes or lesions   Pysch: Normal mood and affect   Neurologic: Normal gross motor and sensory exam.         Labs  No results for input(s): WBC, HGB, HCT, MCV, PLT in the last 72 hours. No results for input(s): CREATININE, BUN, NA, K, CL, CO2 in the last 72 hours. No results for input(s): INR, PROTIME in the last 72 hours. No results for input(s): TROPONINI in the last 72 hours. Invalid input(s): PRO-BNP  No results for input(s): CHOL, LDL, HDL in the last 72 hours. Invalid input(s): TG      Imaging:  I have reviewed the below testing personally and my interpretation is below.   EKG:  CXR:      Assessment:  81 y.o. patient with:  Problem List Items Addressed This Visit       HLD (hyperlipidemia)    Relevant Medications    nitroGLYCERIN (NITROSTAT) 0.4 MG SL tablet    CAD (coronary artery disease) - Primary    Relevant Medications    nitroGLYCERIN (NITROSTAT) 0.4 MG SL tablet    Other Relevant Orders    Echo 2D w doppler w color complete    HTN (hypertension)    Relevant Orders    Echo 2D w doppler w color complete    S/P PTCA (percutaneous transluminal coronary angioplasty)    Relevant Orders    Echo 2D w doppler w color complete         Plan:  I recommend that the patient continue their currently prescribed medications. Their drug modifiable risk factors appear to be well controlled. I will continue to address the need/dosing of medications in future visits. 2.   Continue to follow with primary care provider Rogerio Mckeon MD   3. Order echocardiogram ~ evaluate heart strength   ~A test that records movement of your heart valves and chambers by ultrasound. Evaluates heart valves, any chamber enlargement, abnormal openings, or any fluid in the sac surrounding the heart. 4.    Discussed stress test if echocardiogram is abnormal   5. Follow up in one year if testing normal.     Scribe's attestation: This note was scribed in the presence of Huang Trotter by Tracey Harding RN       I, Dr. Chel Bravo, personally performed the services described in this documentation, as scribed by the above signed scribe in my presence. It is both accurate and complete to my knowledge. I agree with the details independently gathered by the clinical support staff, while the remaining scribed note accurately describes my personal service to the patient. Medical Decision Making: The following items were considered in medical decision making:  Independent review of images  Review / order clinical lab tests  Review / order radiology tests  Decision to obtain old records  Review and summation of old records as accessed through [facility] (a summary of my findings in these old records)      Time Based Itemization  A total of 30 minutes was spent on today's patient encounter.   If applicable, non-patient-facing activities:  (X)Preparing to see the patient and reviewing records  (X) Individual interpretation of results  ( ) Discussion or coordination of care with other health care professionals  () Ordering of unique tests, medications, or procedures  (X) Documentation within the EHR          All questions and concerns were addressed to the patient/family. Alternatives to my treatment were discussed. The note was completed using EMR. Every effort was made to ensure accuracy; however, inadvertent computerized transcription errors may be present.     Yin Noyola MD, [facility], [address], [address] [place] office  [phone] [facility]  2/9/2023  3:14 PM

## 2023-04-21 ENCOUNTER — HOSPITAL ENCOUNTER (OUTPATIENT)
Dept: CARDIOLOGY | Age: 73
Discharge: HOME | End: 2023-04-21
Payer: MEDICARE

## 2023-04-21 DIAGNOSIS — I25.10 CORONARY ARTERY DISEASE INVOLVING NATIVE CORONARY ARTERY OF NATIVE HEART WITHOUT ANGINA PECTORIS: ICD-10-CM

## 2023-04-21 DIAGNOSIS — Z98.61 S/P PTCA (PERCUTANEOUS TRANSLUMINAL CORONARY ANGIOPLASTY): ICD-10-CM

## 2023-04-21 DIAGNOSIS — I10 PRIMARY HYPERTENSION: ICD-10-CM

## 2023-04-21 LAB
LV EF: 55 %
LVEF MODALITY: NORMAL

## 2023-04-21 PROCEDURE — 93306 TTE W/DOPPLER COMPLETE: CPT

## 2023-04-24 ENCOUNTER — TELEPHONE (OUTPATIENT)
Dept: CARDIOLOGY CLINIC | Age: 73
End: 2023-04-24

## 2023-04-24 NOTE — TELEPHONE ENCOUNTER
----- Message from Elaine Benoit MD sent at 4/21/2023  3:54 PM EDT -----  The test is normal. Please call the patient and inform them of the normal result.

## 2023-04-24 NOTE — TELEPHONE ENCOUNTER
Created telephone encounter. Spoke with Artis Chong relayed message per VSP regarding echo. Pt verbalized understanding.

## 2023-05-08 DIAGNOSIS — I10 PRIMARY HYPERTENSION: ICD-10-CM

## 2023-05-08 DIAGNOSIS — I25.10 CORONARY ARTERY DISEASE INVOLVING NATIVE CORONARY ARTERY OF NATIVE HEART WITHOUT ANGINA PECTORIS: ICD-10-CM

## 2023-05-08 RX ORDER — METOPROLOL SUCCINATE 25 MG/1
25 TABLET, EXTENDED RELEASE ORAL DAILY
Qty: 90 TABLET | Refills: 3 | Status: SHIPPED | OUTPATIENT
Start: 2023-05-08

## 2023-05-08 NOTE — TELEPHONE ENCOUNTER
Refill  metoprolol succinate (TOPROL XL) 25 MG extended release tablet [  [facility] [id number] - [id number], [place] [id number] [facility] - [facility] [phone]

## 2024-02-10 ENCOUNTER — HOSPITAL ENCOUNTER (INPATIENT)
Age: 74
LOS: 3 days | Discharge: HOME | DRG: 322 | End: 2024-02-13
Attending: EMERGENCY MEDICINE | Admitting: INTERNAL MEDICINE
Payer: MEDICARE

## 2024-02-10 ENCOUNTER — APPOINTMENT (OUTPATIENT)
Dept: GENERAL RADIOLOGY | Age: 74
DRG: 322 | End: 2024-02-10
Payer: MEDICARE

## 2024-02-10 DIAGNOSIS — R07.9 CHEST PAIN, UNSPECIFIED TYPE: Primary | ICD-10-CM

## 2024-02-10 PROBLEM — R07.89 ATYPICAL CHEST PAIN: Status: ACTIVE | Noted: 2024-02-10

## 2024-02-10 LAB
ALBUMIN SERPL-MCNC: 4.1 G/DL (ref 3.4–5)
ALBUMIN/GLOB SERPL: 1.5 {RATIO} (ref 1.1–2.2)
ALP SERPL-CCNC: 149 U/L (ref 40–129)
ALT SERPL-CCNC: 8 U/L (ref 10–40)
ANION GAP SERPL CALCULATED.3IONS-SCNC: 12 MMOL/L (ref 3–16)
APTT BLD: 27.4 SEC (ref 22.7–35.9)
AST SERPL-CCNC: 25 U/L (ref 15–37)
BASOPHILS # BLD: 0 K/UL (ref 0–0.2)
BASOPHILS NFR BLD: 1 %
BILIRUB SERPL-MCNC: 0.4 MG/DL (ref 0–1)
BUN SERPL-MCNC: 22 MG/DL (ref 7–20)
CALCIUM SERPL-MCNC: 9.5 MG/DL (ref 8.3–10.6)
CHLORIDE SERPL-SCNC: 99 MMOL/L (ref 99–110)
CO2 SERPL-SCNC: 27 MMOL/L (ref 21–32)
CREAT SERPL-MCNC: 1.2 MG/DL (ref 0.6–1.2)
DEPRECATED RDW RBC AUTO: 17 % (ref 12.4–15.4)
DEPRECATED RDW RBC AUTO: 17.3 % (ref 12.4–15.4)
EKG ATRIAL RATE: 116 BPM
EKG DIAGNOSIS: NORMAL
EKG P AXIS: 60 DEGREES
EKG P-R INTERVAL: 136 MS
EKG Q-T INTERVAL: 332 MS
EKG QRS DURATION: 76 MS
EKG QTC CALCULATION (BAZETT): 461 MS
EKG R AXIS: 70 DEGREES
EKG T AXIS: 54 DEGREES
EKG VENTRICULAR RATE: 116 BPM
EOSINOPHIL # BLD: 0.1 K/UL (ref 0–0.6)
EOSINOPHIL NFR BLD: 3.2 %
FLUAV RNA RESP QL NAA+PROBE: NOT DETECTED
FLUBV RNA RESP QL NAA+PROBE: NOT DETECTED
GFR SERPLBLD CREATININE-BSD FMLA CKD-EPI: 48 ML/MIN/{1.73_M2}
GLUCOSE SERPL-MCNC: 147 MG/DL (ref 70–99)
HCT VFR BLD AUTO: 32.3 % (ref 36–48)
HCT VFR BLD AUTO: 35 % (ref 36–48)
HGB BLD-MCNC: 10.6 G/DL (ref 12–16)
HGB BLD-MCNC: 11.6 G/DL (ref 12–16)
INR PPP: 1.13 (ref 0.84–1.16)
LYMPHOCYTES # BLD: 1.1 K/UL (ref 1–5.1)
LYMPHOCYTES NFR BLD: 31.2 %
MCH RBC QN AUTO: 28.7 PG (ref 26–34)
MCH RBC QN AUTO: 29.1 PG (ref 26–34)
MCHC RBC AUTO-ENTMCNC: 32.7 G/DL (ref 31–36)
MCHC RBC AUTO-ENTMCNC: 33.3 G/DL (ref 31–36)
MCV RBC AUTO: 87.3 FL (ref 80–100)
MCV RBC AUTO: 87.8 FL (ref 80–100)
MONOCYTES # BLD: 0.4 K/UL (ref 0–1.3)
MONOCYTES NFR BLD: 10.4 %
NEUTROPHILS # BLD: 1.9 K/UL (ref 1.7–7.7)
NEUTROPHILS NFR BLD: 54.2 %
PLATELET # BLD AUTO: 147 K/UL (ref 135–450)
PLATELET # BLD AUTO: 156 K/UL (ref 135–450)
PMV BLD AUTO: 8.6 FL (ref 5–10.5)
PMV BLD AUTO: 8.6 FL (ref 5–10.5)
POTASSIUM SERPL-SCNC: 3.8 MMOL/L (ref 3.5–5.1)
PROT SERPL-MCNC: 6.8 G/DL (ref 6.4–8.2)
PROTHROMBIN TIME: 14.6 SEC (ref 11.5–14.8)
RBC # BLD AUTO: 3.68 M/UL (ref 4–5.2)
RBC # BLD AUTO: 4 M/UL (ref 4–5.2)
SARS-COV-2 RNA RESP QL NAA+PROBE: NOT DETECTED
SODIUM SERPL-SCNC: 138 MMOL/L (ref 136–145)
TROPONIN, HIGH SENSITIVITY: 21 NG/L (ref 0–14)
TROPONIN, HIGH SENSITIVITY: 24 NG/L (ref 0–14)
TROPONIN, HIGH SENSITIVITY: 37 NG/L (ref 0–14)
WBC # BLD AUTO: 3.4 K/UL (ref 4–11)
WBC # BLD AUTO: 3.5 K/UL (ref 4–11)

## 2024-02-10 PROCEDURE — 2580000003 HC RX 258: Performed by: STUDENT IN AN ORGANIZED HEALTH CARE EDUCATION/TRAINING PROGRAM

## 2024-02-10 PROCEDURE — 80053 COMPREHEN METABOLIC PANEL: CPT

## 2024-02-10 PROCEDURE — 36415 COLL VENOUS BLD VENIPUNCTURE: CPT

## 2024-02-10 PROCEDURE — 85730 THROMBOPLASTIN TIME PARTIAL: CPT

## 2024-02-10 PROCEDURE — 2580000003 HC RX 258: Performed by: EMERGENCY MEDICINE

## 2024-02-10 PROCEDURE — 99285 EMERGENCY DEPT VISIT HI MDM: CPT

## 2024-02-10 PROCEDURE — 93010 ELECTROCARDIOGRAM REPORT: CPT | Performed by: INTERNAL MEDICINE

## 2024-02-10 PROCEDURE — 96360 HYDRATION IV INFUSION INIT: CPT

## 2024-02-10 PROCEDURE — 6370000000 HC RX 637 (ALT 250 FOR IP): Performed by: EMERGENCY MEDICINE

## 2024-02-10 PROCEDURE — 93005 ELECTROCARDIOGRAM TRACING: CPT | Performed by: EMERGENCY MEDICINE

## 2024-02-10 PROCEDURE — 71046 X-RAY EXAM CHEST 2 VIEWS: CPT

## 2024-02-10 PROCEDURE — 85027 COMPLETE CBC AUTOMATED: CPT

## 2024-02-10 PROCEDURE — 84484 ASSAY OF TROPONIN QUANT: CPT

## 2024-02-10 PROCEDURE — 85025 COMPLETE CBC W/AUTO DIFF WBC: CPT

## 2024-02-10 PROCEDURE — 1200000000 HC SEMI PRIVATE

## 2024-02-10 PROCEDURE — 87636 SARSCOV2 & INF A&B AMP PRB: CPT

## 2024-02-10 PROCEDURE — 6360000002 HC RX W HCPCS: Performed by: STUDENT IN AN ORGANIZED HEALTH CARE EDUCATION/TRAINING PROGRAM

## 2024-02-10 PROCEDURE — 6370000000 HC RX 637 (ALT 250 FOR IP): Performed by: STUDENT IN AN ORGANIZED HEALTH CARE EDUCATION/TRAINING PROGRAM

## 2024-02-10 PROCEDURE — 85610 PROTHROMBIN TIME: CPT

## 2024-02-10 RX ORDER — ONDANSETRON 2 MG/ML
4 INJECTION INTRAMUSCULAR; INTRAVENOUS EVERY 6 HOURS PRN
Status: DISCONTINUED | OUTPATIENT
Start: 2024-02-10 | End: 2024-02-13

## 2024-02-10 RX ORDER — NITROGLYCERIN 0.4 MG/1
0.4 TABLET SUBLINGUAL EVERY 5 MIN PRN
Status: DISCONTINUED | OUTPATIENT
Start: 2024-02-10 | End: 2024-02-13 | Stop reason: HOSPADM

## 2024-02-10 RX ORDER — POLYETHYLENE GLYCOL 3350 17 G/17G
17 POWDER, FOR SOLUTION ORAL DAILY PRN
Status: DISCONTINUED | OUTPATIENT
Start: 2024-02-10 | End: 2024-02-13 | Stop reason: HOSPADM

## 2024-02-10 RX ORDER — SODIUM CHLORIDE 0.9 % (FLUSH) 0.9 %
5-40 SYRINGE (ML) INJECTION PRN
Status: DISCONTINUED | OUTPATIENT
Start: 2024-02-10 | End: 2024-02-13

## 2024-02-10 RX ORDER — POTASSIUM CHLORIDE 20 MEQ/1
40 TABLET, EXTENDED RELEASE ORAL PRN
Status: DISCONTINUED | OUTPATIENT
Start: 2024-02-10 | End: 2024-02-13 | Stop reason: HOSPADM

## 2024-02-10 RX ORDER — HEPARIN SODIUM 10000 [USP'U]/100ML
720 INJECTION, SOLUTION INTRAVENOUS CONTINUOUS
Status: DISCONTINUED | OUTPATIENT
Start: 2024-02-11 | End: 2024-02-11

## 2024-02-10 RX ORDER — ASPIRIN 81 MG/1
324 TABLET, CHEWABLE ORAL ONCE
Status: COMPLETED | OUTPATIENT
Start: 2024-02-10 | End: 2024-02-10

## 2024-02-10 RX ORDER — SODIUM CHLORIDE 9 MG/ML
INJECTION, SOLUTION INTRAVENOUS PRN
Status: DISCONTINUED | OUTPATIENT
Start: 2024-02-10 | End: 2024-02-12 | Stop reason: SDUPTHER

## 2024-02-10 RX ORDER — SODIUM CHLORIDE 0.9 % (FLUSH) 0.9 %
5-40 SYRINGE (ML) INJECTION EVERY 12 HOURS SCHEDULED
Status: DISCONTINUED | OUTPATIENT
Start: 2024-02-10 | End: 2024-02-13

## 2024-02-10 RX ORDER — MAGNESIUM SULFATE IN WATER 40 MG/ML
2000 INJECTION, SOLUTION INTRAVENOUS PRN
Status: DISCONTINUED | OUTPATIENT
Start: 2024-02-10 | End: 2024-02-13 | Stop reason: HOSPADM

## 2024-02-10 RX ORDER — ACETAMINOPHEN 325 MG/1
650 TABLET ORAL EVERY 6 HOURS PRN
Status: DISCONTINUED | OUTPATIENT
Start: 2024-02-10 | End: 2024-02-13

## 2024-02-10 RX ORDER — HEPARIN SODIUM 10000 [USP'U]/100ML
720 INJECTION, SOLUTION INTRAVENOUS CONTINUOUS
Status: DISCONTINUED | OUTPATIENT
Start: 2024-02-10 | End: 2024-02-10

## 2024-02-10 RX ORDER — HEPARIN SODIUM 1000 [USP'U]/ML
3600 INJECTION, SOLUTION INTRAVENOUS; SUBCUTANEOUS PRN
Status: DISCONTINUED | OUTPATIENT
Start: 2024-02-10 | End: 2024-02-12

## 2024-02-10 RX ORDER — ENOXAPARIN SODIUM 100 MG/ML
40 INJECTION SUBCUTANEOUS DAILY
Status: DISCONTINUED | OUTPATIENT
Start: 2024-02-10 | End: 2024-02-10

## 2024-02-10 RX ORDER — ONDANSETRON 4 MG/1
4 TABLET, ORALLY DISINTEGRATING ORAL EVERY 8 HOURS PRN
Status: DISCONTINUED | OUTPATIENT
Start: 2024-02-10 | End: 2024-02-13

## 2024-02-10 RX ORDER — HEPARIN SODIUM 1000 [USP'U]/ML
3600 INJECTION, SOLUTION INTRAVENOUS; SUBCUTANEOUS ONCE
Status: DISCONTINUED | OUTPATIENT
Start: 2024-02-11 | End: 2024-02-11

## 2024-02-10 RX ORDER — SODIUM CHLORIDE, SODIUM LACTATE, POTASSIUM CHLORIDE, CALCIUM CHLORIDE 600; 310; 30; 20 MG/100ML; MG/100ML; MG/100ML; MG/100ML
INJECTION, SOLUTION INTRAVENOUS CONTINUOUS
Status: DISCONTINUED | OUTPATIENT
Start: 2024-02-10 | End: 2024-02-11

## 2024-02-10 RX ORDER — LISINOPRIL 2.5 MG/1
2.5 TABLET ORAL DAILY
Status: DISCONTINUED | OUTPATIENT
Start: 2024-02-10 | End: 2024-02-13 | Stop reason: HOSPADM

## 2024-02-10 RX ORDER — ATORVASTATIN CALCIUM 40 MG/1
40 TABLET, FILM COATED ORAL NIGHTLY
Status: DISCONTINUED | OUTPATIENT
Start: 2024-02-10 | End: 2024-02-13 | Stop reason: HOSPADM

## 2024-02-10 RX ORDER — ACETAMINOPHEN 650 MG/1
650 SUPPOSITORY RECTAL EVERY 6 HOURS PRN
Status: DISCONTINUED | OUTPATIENT
Start: 2024-02-10 | End: 2024-02-13

## 2024-02-10 RX ORDER — 0.9 % SODIUM CHLORIDE 0.9 %
1000 INTRAVENOUS SOLUTION INTRAVENOUS ONCE
Status: COMPLETED | OUTPATIENT
Start: 2024-02-10 | End: 2024-02-10

## 2024-02-10 RX ORDER — ASPIRIN 81 MG/1
81 TABLET, CHEWABLE ORAL DAILY
Status: DISCONTINUED | OUTPATIENT
Start: 2024-02-10 | End: 2024-02-13 | Stop reason: HOSPADM

## 2024-02-10 RX ORDER — HEPARIN SODIUM 1000 [USP'U]/ML
3600 INJECTION, SOLUTION INTRAVENOUS; SUBCUTANEOUS ONCE
Status: DISCONTINUED | OUTPATIENT
Start: 2024-02-10 | End: 2024-02-10

## 2024-02-10 RX ORDER — HEPARIN SODIUM 1000 [USP'U]/ML
1800 INJECTION, SOLUTION INTRAVENOUS; SUBCUTANEOUS PRN
Status: DISCONTINUED | OUTPATIENT
Start: 2024-02-10 | End: 2024-02-12

## 2024-02-10 RX ORDER — POTASSIUM CHLORIDE 7.45 MG/ML
10 INJECTION INTRAVENOUS PRN
Status: DISCONTINUED | OUTPATIENT
Start: 2024-02-10 | End: 2024-02-13 | Stop reason: HOSPADM

## 2024-02-10 RX ADMIN — ENOXAPARIN SODIUM 40 MG: 100 INJECTION SUBCUTANEOUS at 21:20

## 2024-02-10 RX ADMIN — ASPIRIN 81 MG 81 MG: 81 TABLET ORAL at 21:20

## 2024-02-10 RX ADMIN — SODIUM CHLORIDE, POTASSIUM CHLORIDE, SODIUM LACTATE AND CALCIUM CHLORIDE: 600; 310; 30; 20 INJECTION, SOLUTION INTRAVENOUS at 21:23

## 2024-02-10 RX ADMIN — LISINOPRIL 2.5 MG: 2.5 TABLET ORAL at 21:20

## 2024-02-10 RX ADMIN — Medication 10 ML: at 21:20

## 2024-02-10 RX ADMIN — SODIUM CHLORIDE 1000 ML: 9 INJECTION, SOLUTION INTRAVENOUS at 15:48

## 2024-02-10 RX ADMIN — ASPIRIN 81 MG 324 MG: 81 TABLET ORAL at 15:48

## 2024-02-10 RX ADMIN — ATORVASTATIN CALCIUM 40 MG: 40 TABLET, FILM COATED ORAL at 21:20

## 2024-02-10 RX ADMIN — NITROGLYCERIN 0.5 INCH: 20 OINTMENT TOPICAL at 15:44

## 2024-02-10 NOTE — ED PROVIDER NOTES
[facility] B[facility] - MED SURG     EMERGENCY DEPARTMENT ENCOUNTER     Location: [facility] B[facility] - MED SURG  2/10/2024  Note Started: 9:44 PM EST 2/10/24      Patient Identification  Bren Vazquez is a 73 y.o. female      HPI:Bren Vazquez was evaluated in the Emergency Department for chest pain.  Patient has noted history of CAD status post stent.  She states approximately 1 hour prior to arrival she began developing substernal chest pain/pressure that she rated as 4/10.  Pain radiated to left arm and jaw.  Patient states that she took 2 nitro tablets as well as a baby aspirin with relief of symptoms.  Of note, patient also had a GI bug this last week stating that she had nausea, vomiting, and diarrhea.  Patient also states that she gave blood yesterday.  She arrives into the emergency department tachycardic... Although initial history and physical exam information was obtained by GI/NPP/MD/ (who also dictated a record of this visit), I personally saw the patient and performed and made/approved the management plan and take responsibility for the patient management.      PHYSICAL EXAM:  General: No acute distress.  Head: Normocephalic/atraumatic.  Heart: Regular rhythm.  Normal S1-S2.  Lungs: Clear to auscultation bilaterally.  No wheezes, rales, rhonchi.  Abdomen soft.  Nontender.  Nondistended.  No rebound or guarding.  Extremities: No swelling or edema.    EKG Interpretation    EKG: Sinus tachycardia with a rate of 116.  Normal axis.  Normal intervals and durations.  No specific ST or T wave changes noted.  Sinus tachycardia has replaced normal sinus rhythm from previous EKG on 5/26/2021    Patient seen and evaluated.  Relevant records reviewed.  MDM  Patient presents to the emergency department complaining of chest pain.  History of CAD status post stents.  Moderate heart score.    I independently interpreted the following studies: EKG: No ischemic changes.  Tachycardia present but felt to be secondary to hypovolemia

## 2024-02-10 NOTE — H&P
edema.      Left lower leg: No edema.   Skin:     Coloration: Skin is not jaundiced or pale.   Neurological:      General: No focal deficit present.      Mental Status: She is alert and oriented to person, place, and time. Mental status is at baseline.      Motor: Weakness present.   Psychiatric:         Mood and Affect: Mood normal.         Behavior: Behavior normal.        Past History:   PMHx   Past Medical History:   Diagnosis Date    CAD (coronary artery disease)     Cancer (HCC)     kidney cancer    Chest pain 2015    + Troponins/ Abn Stress Test/ PTCA    Family history of early CAD     Hyperlipidemia     Hypertension     Knee pain     right        PSHX:  has a past surgical history that includes  section; Percutaneous Transluminal Coronary Angio (2015); eye surgery; Coronary angioplasty with stent (2015); Cardiac surgery; total nephrectomy (Right, 16); and Femur fracture surgery (Right, 2021).    Fam HX: family history includes Diabetes in her father; Heart Disease in her father and paternal grandfather; High Blood Pressure in her father.    Soc HX:   Social History     Socioeconomic History    Marital status:      Spouse name: None    Number of children: 2    Years of education: None    Highest education level: None   Tobacco Use    Smoking status: Never    Smokeless tobacco: Never   Vaping Use    Vaping Use: Never used   Substance and Sexual Activity    Alcohol use: Yes     Comment: Occasionally    Drug use: No    Sexual activity: Yes     Partners: Male       Allergies:   Allergies:   Allergies   Allergen Reactions    Neomycin-Bacitracin Zn-Polymyx Rash    Neosporin [Bacitracin-Neomycin-Polymyxin]     Neomycin Rash       Medications:   Medications:    sodium chloride  1,000 mL IntraVENous Once      Infusions:   PRN Meds:      Labs      CBC:   Recent Labs     02/10/24  1444   WBC 3.5*   HGB 11.6*        BMP:    Recent Labs     02/10/24  1444      K 3.8

## 2024-02-11 LAB
ANION GAP SERPL CALCULATED.3IONS-SCNC: 9 MMOL/L (ref 3–16)
ANTI-XA UNFRAC HEPARIN: 0.32 IU/ML (ref 0.3–0.7)
ANTI-XA UNFRAC HEPARIN: <0.1 IU/ML (ref 0.3–0.7)
BUN SERPL-MCNC: 19 MG/DL (ref 7–20)
CALCIUM SERPL-MCNC: 8.5 MG/DL (ref 8.3–10.6)
CHLORIDE SERPL-SCNC: 107 MMOL/L (ref 99–110)
CHOLEST SERPL-MCNC: 108 MG/DL (ref 0–199)
CO2 SERPL-SCNC: 26 MMOL/L (ref 21–32)
CREAT SERPL-MCNC: 0.8 MG/DL (ref 0.6–1.2)
DEPRECATED RDW RBC AUTO: 16.9 % (ref 12.4–15.4)
GFR SERPLBLD CREATININE-BSD FMLA CKD-EPI: >60 ML/MIN/{1.73_M2}
GLUCOSE SERPL-MCNC: 84 MG/DL (ref 70–99)
HCT VFR BLD AUTO: 29 % (ref 36–48)
HDLC SERPL-MCNC: 40 MG/DL (ref 40–60)
HGB BLD-MCNC: 9.4 G/DL (ref 12–16)
LDLC SERPL CALC-MCNC: 41 MG/DL
MAGNESIUM SERPL-MCNC: 2.1 MG/DL (ref 1.8–2.4)
MCH RBC QN AUTO: 28.6 PG (ref 26–34)
MCHC RBC AUTO-ENTMCNC: 32.5 G/DL (ref 31–36)
MCV RBC AUTO: 88.1 FL (ref 80–100)
PHOSPHATE SERPL-MCNC: 3.1 MG/DL (ref 2.5–4.9)
PLATELET # BLD AUTO: 125 K/UL (ref 135–450)
PMV BLD AUTO: 9 FL (ref 5–10.5)
POTASSIUM SERPL-SCNC: 3.9 MMOL/L (ref 3.5–5.1)
RBC # BLD AUTO: 3.29 M/UL (ref 4–5.2)
SODIUM SERPL-SCNC: 142 MMOL/L (ref 136–145)
TRIGL SERPL-MCNC: 134 MG/DL (ref 0–150)
TROPONIN, HIGH SENSITIVITY: 18 NG/L (ref 0–14)
TROPONIN, HIGH SENSITIVITY: 19 NG/L (ref 0–14)
TROPONIN, HIGH SENSITIVITY: 22 NG/L (ref 0–14)
TROPONIN, HIGH SENSITIVITY: 23 NG/L (ref 0–14)
TROPONIN, HIGH SENSITIVITY: 30 NG/L (ref 0–14)
VLDLC SERPL CALC-MCNC: 27 MG/DL
WBC # BLD AUTO: 3 K/UL (ref 4–11)

## 2024-02-11 PROCEDURE — 80048 BASIC METABOLIC PNL TOTAL CA: CPT

## 2024-02-11 PROCEDURE — 84100 ASSAY OF PHOSPHORUS: CPT

## 2024-02-11 PROCEDURE — 85027 COMPLETE CBC AUTOMATED: CPT

## 2024-02-11 PROCEDURE — 83036 HEMOGLOBIN GLYCOSYLATED A1C: CPT

## 2024-02-11 PROCEDURE — 6370000000 HC RX 637 (ALT 250 FOR IP): Performed by: NURSE PRACTITIONER

## 2024-02-11 PROCEDURE — 85520 HEPARIN ASSAY: CPT

## 2024-02-11 PROCEDURE — 1200000000 HC SEMI PRIVATE

## 2024-02-11 PROCEDURE — 83735 ASSAY OF MAGNESIUM: CPT

## 2024-02-11 PROCEDURE — 80061 LIPID PANEL: CPT

## 2024-02-11 PROCEDURE — 6360000002 HC RX W HCPCS: Performed by: NURSE PRACTITIONER

## 2024-02-11 PROCEDURE — 84484 ASSAY OF TROPONIN QUANT: CPT

## 2024-02-11 PROCEDURE — 2580000003 HC RX 258: Performed by: STUDENT IN AN ORGANIZED HEALTH CARE EDUCATION/TRAINING PROGRAM

## 2024-02-11 PROCEDURE — 6370000000 HC RX 637 (ALT 250 FOR IP): Performed by: STUDENT IN AN ORGANIZED HEALTH CARE EDUCATION/TRAINING PROGRAM

## 2024-02-11 PROCEDURE — 36415 COLL VENOUS BLD VENIPUNCTURE: CPT

## 2024-02-11 RX ORDER — METOPROLOL SUCCINATE 25 MG/1
25 TABLET, EXTENDED RELEASE ORAL DAILY
Status: DISCONTINUED | OUTPATIENT
Start: 2024-02-11 | End: 2024-02-13 | Stop reason: HOSPADM

## 2024-02-11 RX ORDER — HEPARIN SODIUM 10000 [USP'U]/100ML
840 INJECTION, SOLUTION INTRAVENOUS CONTINUOUS
Status: DISCONTINUED | OUTPATIENT
Start: 2024-02-11 | End: 2024-02-12

## 2024-02-11 RX ORDER — HEPARIN SODIUM 1000 [USP'U]/ML
3600 INJECTION, SOLUTION INTRAVENOUS; SUBCUTANEOUS ONCE
Status: COMPLETED | OUTPATIENT
Start: 2024-02-11 | End: 2024-02-11

## 2024-02-11 RX ADMIN — METOPROLOL SUCCINATE 25 MG: 25 TABLET, EXTENDED RELEASE ORAL at 11:08

## 2024-02-11 RX ADMIN — Medication 10 ML: at 21:08

## 2024-02-11 RX ADMIN — HEPARIN SODIUM AND DEXTROSE 720 UNITS/HR: 10000; 5 INJECTION INTRAVENOUS at 10:34

## 2024-02-11 RX ADMIN — ASPIRIN 81 MG 81 MG: 81 TABLET ORAL at 08:13

## 2024-02-11 RX ADMIN — ATORVASTATIN CALCIUM 40 MG: 40 TABLET, FILM COATED ORAL at 21:08

## 2024-02-11 RX ADMIN — LISINOPRIL 2.5 MG: 2.5 TABLET ORAL at 08:13

## 2024-02-11 RX ADMIN — HEPARIN SODIUM 3600 UNITS: 1000 INJECTION INTRAVENOUS; SUBCUTANEOUS at 10:47

## 2024-02-11 NOTE — PLAN OF CARE
Problem: Discharge Planning  Goal: Discharge to home or other facility with appropriate resources  Outcome: Progressing     Problem: Cardiovascular - Adult  Goal: Maintains optimal cardiac output and hemodynamic stability  Outcome: Progressing     Problem: Metabolic/Fluid and Electrolytes - Adult  Goal: Electrolytes maintained within normal limits  Outcome: Progressing     Problem: Hematologic - Adult  Goal: Maintains hematologic stability  Outcome: Progressing

## 2024-02-11 NOTE — CONSULTS
Pharmacy to manage Heparin - contact for questions.    Heparin 60 units/kg IV x 1 (max 4,000 units), then 12 units/kg/hr (recommended max initial rate 1,000 units/hr). Adjust infusion rate based off anti-Xa results below.     anti-Xa < 0.1    Heparin 60 units/kg bolus  Increase infusion by 4 units/kg/hr  anti-Xa 0.1-0.29 Heparin 30 units/kg bolus Increase infusion by 2 units/kg/hr  anti-Xa 0.3-0.7    No bolus No change   anti-Xa 0.71-0.8   No bolus Decrease infusion by 1 units/kg/hr  anti-Xa 0.81-0.99    No bolus Decrease infusion by 2 units/kg/hr  anti-Xa 1 or more     Hold heparin for 1 hour Decrease infusion by 3 units/kg/hr    Obtain anti-Xa 6 hours after bolus and 6 hours after any dose change until two consecutive therapeutic anti-Xa are achieved- then daily.    Plan: give 3600 units of heparin as bolus, start heparin infusion rate of 720 units/hr ,recheck anti-Xa 0430 2/12/24  Pt. Received enoxaparin 40mg 2/10/24 at 2120, will start heparin 2/11/24 2100  Mesfin Fatima PharmD 2/10/2024  10:04 PM    2/11/2024 5:59 PM     Anti-Xa this evening at 1642 was therapeutic at 0.32 units/mL   Continue Heparin infusion at 720 units/hr with no change.  Recheck anti-Xa in 6 hours.    Thanks,   Tanika Bear, PharmD, [facility]     2/11/24 at 2302  Anti-Xa: <0.10  Plan: give 3600 units of heparin as bolus, increase heparin infusion rate to 960 units/hr, recheck anti-Xa at 0630  Mesfin Salazarn PharmD 2/12/2024  12:07 AM    -------------------------------  2/12 1027  Low-Dose Heparin Drip  Current Rate: 960 units/hr  2/12 @ 0948 Anti-Xa Level= 0.98  Plan: Per pharmacy dosing, we will decrease heparin drip by 120 units/hr, making new drip rate 840 units/hr    Next Anti-Xa Level: 2/12 @ 1630  Paulette Moon, PharmD 2/12/2024 10:26 AM  
0

## 2024-02-12 ENCOUNTER — APPOINTMENT (OUTPATIENT)
Dept: CARDIAC CATH/INVASIVE PROCEDURES | Age: 74
DRG: 322 | End: 2024-02-12
Payer: MEDICARE

## 2024-02-12 PROBLEM — I21.4 NSTEMI (NON-ST ELEVATED MYOCARDIAL INFARCTION) (HCC): Status: ACTIVE | Noted: 2024-02-12

## 2024-02-12 LAB
ANION GAP SERPL CALCULATED.3IONS-SCNC: 8 MMOL/L (ref 3–16)
ANTI-XA UNFRAC HEPARIN: 0.98 IU/ML (ref 0.3–0.7)
BUN SERPL-MCNC: 17 MG/DL (ref 7–20)
CALCIUM SERPL-MCNC: 8.9 MG/DL (ref 8.3–10.6)
CHLORIDE SERPL-SCNC: 106 MMOL/L (ref 99–110)
CO2 SERPL-SCNC: 25 MMOL/L (ref 21–32)
CREAT SERPL-MCNC: 1 MG/DL (ref 0.6–1.2)
DEPRECATED RDW RBC AUTO: 17.4 % (ref 12.4–15.4)
EST. AVERAGE GLUCOSE BLD GHB EST-MCNC: 105.4 MG/DL
FERRITIN SERPL IA-MCNC: 36.6 NG/ML (ref 15–150)
FOLATE SERPL-MCNC: 12.75 NG/ML (ref 4.78–24.2)
GFR SERPLBLD CREATININE-BSD FMLA CKD-EPI: >60 ML/MIN/{1.73_M2}
GLUCOSE SERPL-MCNC: 87 MG/DL (ref 70–99)
HBA1C MFR BLD: 5.3 %
HCT VFR BLD AUTO: 33.4 % (ref 36–48)
HGB BLD-MCNC: 10.8 G/DL (ref 12–16)
IMMATURE RETIC FRACT: 0.33 (ref 0.21–0.37)
IRON SATN MFR SERPL: 15 % (ref 15–50)
IRON SERPL-MCNC: 43 UG/DL (ref 37–145)
MAGNESIUM SERPL-MCNC: 2.1 MG/DL (ref 1.8–2.4)
MCH RBC QN AUTO: 28.5 PG (ref 26–34)
MCHC RBC AUTO-ENTMCNC: 32.4 G/DL (ref 31–36)
MCV RBC AUTO: 88.1 FL (ref 80–100)
PHOSPHATE SERPL-MCNC: 2.8 MG/DL (ref 2.5–4.9)
PLATELET # BLD AUTO: 146 K/UL (ref 135–450)
PMV BLD AUTO: 8.6 FL (ref 5–10.5)
POTASSIUM SERPL-SCNC: 3.8 MMOL/L (ref 3.5–5.1)
RBC # BLD AUTO: 3.8 M/UL (ref 4–5.2)
RETICS # AUTO: 0.03 M/UL (ref 0.02–0.1)
RETICS/RBC NFR AUTO: 0.83 % (ref 0.5–2.18)
SODIUM SERPL-SCNC: 139 MMOL/L (ref 136–145)
TIBC SERPL-MCNC: 288 UG/DL (ref 260–445)
TROPONIN, HIGH SENSITIVITY: 18 NG/L (ref 0–14)
VIT B12 SERPL-MCNC: 756 PG/ML (ref 211–911)
WBC # BLD AUTO: 3.3 K/UL (ref 4–11)

## 2024-02-12 PROCEDURE — 93306 TTE W/DOPPLER COMPLETE: CPT

## 2024-02-12 PROCEDURE — C1887 CATHETER, GUIDING: HCPCS | Performed by: INTERNAL MEDICINE

## 2024-02-12 PROCEDURE — C9601 PERC DRUG-EL COR STENT BRAN: HCPCS

## 2024-02-12 PROCEDURE — 83735 ASSAY OF MAGNESIUM: CPT

## 2024-02-12 PROCEDURE — 6370000000 HC RX 637 (ALT 250 FOR IP): Performed by: INTERNAL MEDICINE

## 2024-02-12 PROCEDURE — 93005 ELECTROCARDIOGRAM TRACING: CPT | Performed by: INTERNAL MEDICINE

## 2024-02-12 PROCEDURE — C1753 CATH, INTRAVAS ULTRASOUND: HCPCS | Performed by: INTERNAL MEDICINE

## 2024-02-12 PROCEDURE — 99152 MOD SED SAME PHYS/QHP 5/>YRS: CPT | Performed by: INTERNAL MEDICINE

## 2024-02-12 PROCEDURE — C1769 GUIDE WIRE: HCPCS | Performed by: INTERNAL MEDICINE

## 2024-02-12 PROCEDURE — 6370000000 HC RX 637 (ALT 250 FOR IP): Performed by: NURSE PRACTITIONER

## 2024-02-12 PROCEDURE — 83550 IRON BINDING TEST: CPT

## 2024-02-12 PROCEDURE — 85045 AUTOMATED RETICULOCYTE COUNT: CPT

## 2024-02-12 PROCEDURE — 85027 COMPLETE CBC AUTOMATED: CPT

## 2024-02-12 PROCEDURE — C1874 STENT, COATED/COV W/DEL SYS: HCPCS | Performed by: INTERNAL MEDICINE

## 2024-02-12 PROCEDURE — 36415 COLL VENOUS BLD VENIPUNCTURE: CPT

## 2024-02-12 PROCEDURE — C1725 CATH, TRANSLUMIN NON-LASER: HCPCS | Performed by: INTERNAL MEDICINE

## 2024-02-12 PROCEDURE — 2580000003 HC RX 258: Performed by: INTERNAL MEDICINE

## 2024-02-12 PROCEDURE — 4A023N7 MEASUREMENT OF CARDIAC SAMPLING AND PRESSURE, LEFT HEART, PERCUTANEOUS APPROACH: ICD-10-PCS | Performed by: INTERNAL MEDICINE

## 2024-02-12 PROCEDURE — 92928 PRQ TCAT PLMT NTRAC ST 1 LES: CPT | Performed by: INTERNAL MEDICINE

## 2024-02-12 PROCEDURE — B2111ZZ FLUOROSCOPY OF MULTIPLE CORONARY ARTERIES USING LOW OSMOLAR CONTRAST: ICD-10-PCS | Performed by: INTERNAL MEDICINE

## 2024-02-12 PROCEDURE — 83540 ASSAY OF IRON: CPT

## 2024-02-12 PROCEDURE — 6370000000 HC RX 637 (ALT 250 FOR IP): Performed by: STUDENT IN AN ORGANIZED HEALTH CARE EDUCATION/TRAINING PROGRAM

## 2024-02-12 PROCEDURE — 84484 ASSAY OF TROPONIN QUANT: CPT

## 2024-02-12 PROCEDURE — 85520 HEPARIN ASSAY: CPT

## 2024-02-12 PROCEDURE — C9600 PERC DRUG-EL COR STENT SING: HCPCS

## 2024-02-12 PROCEDURE — 80048 BASIC METABOLIC PNL TOTAL CA: CPT

## 2024-02-12 PROCEDURE — 2580000003 HC RX 258: Performed by: STUDENT IN AN ORGANIZED HEALTH CARE EDUCATION/TRAINING PROGRAM

## 2024-02-12 PROCEDURE — 1200000000 HC SEMI PRIVATE

## 2024-02-12 PROCEDURE — 92978 ENDOLUMINL IVUS OCT C 1ST: CPT

## 2024-02-12 PROCEDURE — 82607 VITAMIN B-12: CPT

## 2024-02-12 PROCEDURE — B2151ZZ FLUOROSCOPY OF LEFT HEART USING LOW OSMOLAR CONTRAST: ICD-10-PCS | Performed by: INTERNAL MEDICINE

## 2024-02-12 PROCEDURE — 93458 L HRT ARTERY/VENTRICLE ANGIO: CPT | Performed by: INTERNAL MEDICINE

## 2024-02-12 PROCEDURE — 93458 L HRT ARTERY/VENTRICLE ANGIO: CPT

## 2024-02-12 PROCEDURE — 92978 ENDOLUMINL IVUS OCT C 1ST: CPT | Performed by: INTERNAL MEDICINE

## 2024-02-12 PROCEDURE — 2709999900 HC NON-CHARGEABLE SUPPLY: Performed by: INTERNAL MEDICINE

## 2024-02-12 PROCEDURE — 82746 ASSAY OF FOLIC ACID SERUM: CPT

## 2024-02-12 PROCEDURE — 82728 ASSAY OF FERRITIN: CPT

## 2024-02-12 PROCEDURE — C1760 CLOSURE DEV, VASC: HCPCS | Performed by: INTERNAL MEDICINE

## 2024-02-12 PROCEDURE — 99223 1ST HOSP IP/OBS HIGH 75: CPT | Performed by: INTERNAL MEDICINE

## 2024-02-12 PROCEDURE — C1894 INTRO/SHEATH, NON-LASER: HCPCS | Performed by: INTERNAL MEDICINE

## 2024-02-12 PROCEDURE — 85347 COAGULATION TIME ACTIVATED: CPT

## 2024-02-12 PROCEDURE — 2500000003 HC RX 250 WO HCPCS

## 2024-02-12 PROCEDURE — 0271356 DILATION OF CORONARY ARTERY, TWO ARTERIES, BIFURCATION, WITH TWO DRUG-ELUTING INTRALUMINAL DEVICES, PERCUTANEOUS APPROACH: ICD-10-PCS | Performed by: INTERNAL MEDICINE

## 2024-02-12 PROCEDURE — 6370000000 HC RX 637 (ALT 250 FOR IP)

## 2024-02-12 PROCEDURE — 84100 ASSAY OF PHOSPHORUS: CPT

## 2024-02-12 PROCEDURE — 6360000002 HC RX W HCPCS

## 2024-02-12 PROCEDURE — 6360000002 HC RX W HCPCS: Performed by: INTERNAL MEDICINE

## 2024-02-12 RX ORDER — ONDANSETRON 2 MG/ML
4 INJECTION INTRAMUSCULAR; INTRAVENOUS EVERY 6 HOURS PRN
Status: DISCONTINUED | OUTPATIENT
Start: 2024-02-12 | End: 2024-02-13 | Stop reason: HOSPADM

## 2024-02-12 RX ORDER — SODIUM CHLORIDE 9 MG/ML
INJECTION, SOLUTION INTRAVENOUS PRN
Status: DISCONTINUED | OUTPATIENT
Start: 2024-02-12 | End: 2024-02-13

## 2024-02-12 RX ORDER — SODIUM CHLORIDE 0.9 % (FLUSH) 0.9 %
5-40 SYRINGE (ML) INJECTION EVERY 12 HOURS SCHEDULED
Status: DISCONTINUED | OUTPATIENT
Start: 2024-02-12 | End: 2024-02-13

## 2024-02-12 RX ORDER — SODIUM CHLORIDE 0.9 % (FLUSH) 0.9 %
5-40 SYRINGE (ML) INJECTION EVERY 12 HOURS SCHEDULED
Status: DISCONTINUED | OUTPATIENT
Start: 2024-02-12 | End: 2024-02-13 | Stop reason: HOSPADM

## 2024-02-12 RX ORDER — HEPARIN SODIUM 1000 [USP'U]/ML
3600 INJECTION, SOLUTION INTRAVENOUS; SUBCUTANEOUS ONCE
Status: COMPLETED | OUTPATIENT
Start: 2024-02-12 | End: 2024-02-12

## 2024-02-12 RX ORDER — ONDANSETRON 2 MG/ML
4 INJECTION INTRAMUSCULAR; INTRAVENOUS EVERY 6 HOURS PRN
Status: DISCONTINUED | OUTPATIENT
Start: 2024-02-12 | End: 2024-02-12 | Stop reason: SDUPTHER

## 2024-02-12 RX ORDER — ACETAMINOPHEN 325 MG/1
650 TABLET ORAL EVERY 4 HOURS PRN
Status: DISCONTINUED | OUTPATIENT
Start: 2024-02-12 | End: 2024-02-13 | Stop reason: HOSPADM

## 2024-02-12 RX ORDER — SODIUM CHLORIDE 0.9 % (FLUSH) 0.9 %
5-40 SYRINGE (ML) INJECTION PRN
Status: DISCONTINUED | OUTPATIENT
Start: 2024-02-12 | End: 2024-02-13 | Stop reason: HOSPADM

## 2024-02-12 RX ORDER — SODIUM CHLORIDE 0.9 % (FLUSH) 0.9 %
5-40 SYRINGE (ML) INJECTION PRN
Status: DISCONTINUED | OUTPATIENT
Start: 2024-02-12 | End: 2024-02-13

## 2024-02-12 RX ORDER — HYDRALAZINE HYDROCHLORIDE 20 MG/ML
10 INJECTION INTRAMUSCULAR; INTRAVENOUS EVERY 10 MIN PRN
Status: DISCONTINUED | OUTPATIENT
Start: 2024-02-12 | End: 2024-02-13 | Stop reason: HOSPADM

## 2024-02-12 RX ORDER — SODIUM CHLORIDE 9 MG/ML
INJECTION, SOLUTION INTRAVENOUS PRN
Status: DISCONTINUED | OUTPATIENT
Start: 2024-02-12 | End: 2024-02-13 | Stop reason: HOSPADM

## 2024-02-12 RX ORDER — ASPIRIN 81 MG/1
243 TABLET, CHEWABLE ORAL ONCE
Status: COMPLETED | OUTPATIENT
Start: 2024-02-12 | End: 2024-02-12

## 2024-02-12 RX ORDER — MIDAZOLAM HYDROCHLORIDE 1 MG/ML
INJECTION INTRAMUSCULAR; INTRAVENOUS
Status: COMPLETED | OUTPATIENT
Start: 2024-02-12 | End: 2024-02-12

## 2024-02-12 RX ORDER — FENTANYL CITRATE 50 UG/ML
INJECTION, SOLUTION INTRAMUSCULAR; INTRAVENOUS
Status: COMPLETED | OUTPATIENT
Start: 2024-02-12 | End: 2024-02-12

## 2024-02-12 RX ORDER — LORAZEPAM 0.5 MG/1
0.5 TABLET ORAL
Status: ACTIVE | OUTPATIENT
Start: 2024-02-12 | End: 2024-02-13

## 2024-02-12 RX ORDER — SODIUM CHLORIDE 9 MG/ML
INJECTION, SOLUTION INTRAVENOUS CONTINUOUS
Status: ACTIVE | OUTPATIENT
Start: 2024-02-12 | End: 2024-02-12

## 2024-02-12 RX ORDER — HEPARIN SODIUM 1000 [USP'U]/ML
INJECTION, SOLUTION INTRAVENOUS; SUBCUTANEOUS
Status: COMPLETED | OUTPATIENT
Start: 2024-02-12 | End: 2024-02-12

## 2024-02-12 RX ADMIN — HEPARIN SODIUM 3000 UNITS: 1000 INJECTION, SOLUTION INTRAVENOUS; SUBCUTANEOUS at 14:53

## 2024-02-12 RX ADMIN — METOPROLOL SUCCINATE 25 MG: 25 TABLET, EXTENDED RELEASE ORAL at 09:36

## 2024-02-12 RX ADMIN — ATORVASTATIN CALCIUM 40 MG: 40 TABLET, FILM COATED ORAL at 21:19

## 2024-02-12 RX ADMIN — ACETAMINOPHEN 650 MG: 325 TABLET ORAL at 22:55

## 2024-02-12 RX ADMIN — TICAGRELOR 90 MG: 90 TABLET ORAL at 21:18

## 2024-02-12 RX ADMIN — SODIUM CHLORIDE: 9 INJECTION, SOLUTION INTRAVENOUS at 15:45

## 2024-02-12 RX ADMIN — FENTANYL CITRATE 50 MCG: 50 INJECTION, SOLUTION INTRAMUSCULAR; INTRAVENOUS at 14:26

## 2024-02-12 RX ADMIN — MIDAZOLAM HYDROCHLORIDE 1 MG: 1 INJECTION INTRAMUSCULAR; INTRAVENOUS at 14:26

## 2024-02-12 RX ADMIN — ASPIRIN 81 MG 81 MG: 81 TABLET ORAL at 09:36

## 2024-02-12 RX ADMIN — LISINOPRIL 2.5 MG: 2.5 TABLET ORAL at 09:36

## 2024-02-12 RX ADMIN — Medication 10 ML: at 12:34

## 2024-02-12 RX ADMIN — HEPARIN SODIUM 3600 UNITS: 1000 INJECTION INTRAVENOUS; SUBCUTANEOUS at 00:20

## 2024-02-12 RX ADMIN — ACETAMINOPHEN 650 MG: 325 TABLET ORAL at 17:21

## 2024-02-12 RX ADMIN — FENTANYL CITRATE 25 MCG: 50 INJECTION, SOLUTION INTRAMUSCULAR; INTRAVENOUS at 14:50

## 2024-02-12 RX ADMIN — SODIUM CHLORIDE: 9 INJECTION, SOLUTION INTRAVENOUS at 12:55

## 2024-02-12 RX ADMIN — Medication 10 ML: at 21:04

## 2024-02-12 RX ADMIN — SODIUM CHLORIDE: 9 INJECTION, SOLUTION INTRAVENOUS at 21:04

## 2024-02-12 RX ADMIN — ASPIRIN 81 MG 243 MG: 81 TABLET ORAL at 12:31

## 2024-02-12 RX ADMIN — FENTANYL CITRATE 25 MCG: 50 INJECTION, SOLUTION INTRAMUSCULAR; INTRAVENOUS at 15:44

## 2024-02-12 NOTE — ACP (ADVANCE CARE PLANNING)
Advance Care Planning     General Advance Care Planning (ACP) Conversation    Date of Conversation: 2/10/2024  Conducted with: Patient with Decision Making Capacity   Healthcare Decision Maker: Next of Kin by law (only applies in absence of above) (name) spouse Morro Vazquez [phone]    Healthcare Decision Maker:  No healthcare decision makers have been documented.  Click here to complete HealthCare Decision Makers including selection of the Healthcare Decision Maker Relationship (ie \"Primary\")  Today we documented Decision Maker(s) consistent with Legal Next of Kin hierarchy.    Content/Action Overview:  Has NO ACP documents-Information provided  Reviewed DNR/DNI and patient elects Full Code (Attempt Resuscitation)      Length of Voluntary ACP Conversation in minutes:  <16 minutes (Non-Billable)    Nenita Fisher RN

## 2024-02-12 NOTE — ACP (ADVANCE CARE PLANNING)
Advance Care Planning     Advance Care Planning Inpatient Note  Spiritual Care Department    Today's Date: 2/12/2024  Unit: [facility] B3 - MED SURG    Received request from HealthCare Provider.  Upon review of chart and communication with care team, patient's decision making abilities are not in question.. Patient was/were present in the room during visit.    Goals of ACP Conversation:  Discuss advance care planning documents    Health Care Decision Makers:     Summary:  No Decision Maker named by patient at this time    Advance Care Planning Documents (Patient Wishes):  Healthcare Power of /Advance Directive Appointment of Health Care Agent  Living Will/Advance Directive were given to patient.     Assessment:  Patient was receptive to ACP discussion. She stated that her  is aware of her wishes. She has two daughters. Explained to her how ACP documents can assist in determining a secondary person in case her  also was incapacitated or otherwise unavailable. Patient endorses being Sikhism and is active in her [employer].    Interventions:  Provided education on documents for clarity and greater understanding  Encouraged ongoing ACP conversation with future decision makers and loved ones    Care Preferences Communicated:   No    Outcomes/Plan:  ACP Discussion: She will review documents with her  and daughter and make a decision.    Electronically signed by Chaplain Nataliia on 2/12/2024 at 10:35 AM

## 2024-02-12 NOTE — POST SEDATION
Patient:  Bren Vazquez   :   1950    A pre-sedation re-evaluation was performed immediately at the end of the procedure.  Procedure:  Cardiac cath  Medications: Procedural sedation with minimal conscious sedation  Complications: None  Estimated Blood Loss: none  Specimens: Were not obtained        [place] Medication and Procedural Reconciliation:  I agree that the documented medications and procedures performed are true.  The medications were given under my order.  The procedures were performed under my direct supervision.

## 2024-02-12 NOTE — PLAN OF CARE
Problem: Discharge Planning  Goal: Discharge to home or other facility with appropriate resources  Outcome: Progressing  Flowsheets (Taken 2/11/2024 1050 by Vika Gonzalez, RN)  Discharge to home or other facility with appropriate resources:   Identify barriers to discharge with patient and caregiver   Arrange for needed discharge resources and transportation as appropriate   Identify discharge learning needs (meds, wound care, etc)     Problem: Cardiovascular - Adult  Goal: Maintains optimal cardiac output and hemodynamic stability  Outcome: Progressing     Problem: Metabolic/Fluid and Electrolytes - Adult  Goal: Electrolytes maintained within normal limits  Outcome: Progressing     Problem: Hematologic - Adult  Goal: Maintains hematologic stability  Outcome: Progressing  Flowsheets (Taken 2/11/2024 1050 by Vika Gonzalez, RN)  Maintains hematologic stability: Assess for signs and symptoms of bleeding or hemorrhage

## 2024-02-12 NOTE — PRE SEDATION
PRN Yue Bartlett  mL/hr at 02/12/24 1255 New Bag at 02/12/24 1255    LORazepam (ATIVAN) tablet 0.5 mg  0.5 mg Oral Once PRN Yue Bartlett MD        heparin 25,000 units in dextrose 5% 250 mL (premix) infusion  840 Units/hr IntraVENous Continuous Morro Cartagena MD 8.4 mL/hr at 02/12/24 1030 840 Units/hr at 02/12/24 1030    metoprolol succinate (TOPROL XL) extended release tablet 25 mg  25 mg Oral Daily Cornelia Hoover APRN - CNP   25 mg at 02/12/24 0936    atorvastatin (LIPITOR) tablet 40 mg  40 mg Oral Nightly Sloan Sánchez MD   40 mg at 02/11/24 2108    aspirin chewable tablet 81 mg  81 mg Oral Daily Sloan Sánchez MD   81 mg at 02/12/24 0936    lisinopril (PRINIVIL;ZESTRIL) tablet 2.5 mg  2.5 mg Oral Daily Sloan Sánchez MD   2.5 mg at 02/12/24 0936    nitroGLYCERIN (NITROSTAT) SL tablet 0.4 mg  0.4 mg SubLINGual Q5 Min PRN Sloan Sánchez MD        sodium chloride flush 0.9 % injection 5-40 mL  5-40 mL IntraVENous 2 times per day Sloan Sánchez MD   10 mL at 02/12/24 1234    sodium chloride flush 0.9 % injection 5-40 mL  5-40 mL IntraVENous PRN Sloan Sánchez MD        potassium chloride (KLOR-CON M) extended release tablet 40 mEq  40 mEq Oral PRN Sloan Sánchez MD        Or    potassium bicarb-citric acid (EFFER-K) effervescent tablet 40 mEq  40 mEq Oral PRN Sloan Sánchez MD        Or    potassium chloride 10 mEq/100 mL IVPB (Peripheral Line)  10 mEq IntraVENous PRN Sloan Sánchez MD        magnesium sulfate 2000 mg in 50 mL IVPB premix  2,000 mg IntraVENous PRN Sloan Sánchez MD        ondansetron (ZOFRAN-ODT) disintegrating tablet 4 mg  4 mg Oral Q8H PRN Sloan Sánchez MD        Or    ondansetron (ZOFRAN) injection 4 mg  4 mg IntraVENous Q6H PRN Sloan Sánchez MD        polyethylene glycol (GLYCOLAX) packet 17 g  17 g Oral Daily PRN Sloan Sánchez MD        acetaminophen (TYLENOL) tablet 650 mg  650 mg Oral Q6H PRN Sloan Sánchez MD        Or    acetaminophen (TYLENOL) suppository 650 mg  650

## 2024-02-12 NOTE — PROCEDURES
. There was no significant mitral valve or aortic valve disease noted. LVEDP was normal. There was no gradient noted across the aortic valve during pullback of the catheter.    /84   Pulse 71   Temp 97.9 °F (36.6 °C) (Oral)   Resp 18   Ht 1.626 m (5' 4\")   Wt 67.4 kg (148 lb 11.2 oz)   SpO2 98%   BMI 25.52 kg/m²     The access site was controlled with manual pressure and/or appropriate closure device.    Moderate Conscious Sedation Details:  An independent trained observer pushed medications at my direction. We monitoring the patient's level of consciousness and vital signs/physiologic status throughout the procedure.  CPT codes 76658 and 96269      Start time: 1420  Stop time: 1550  ASA class: 3    Sedation totals:  Versad - 1mg  Fentanyl - 100mcg    EBL - minimal <5 cc blood loss    The patient was monitored continuously with the ECG, pulse oximetry, blood pressure, and direct observation.      CONCLUSIONS:    1.  Successful IVUS guided DK crush bifurcation stenting of the LAD and diagonal branch.    ASSESSMENT/RECOMMENDATIONS:    1.  Initiate dual antiplatelet therapy with aspirin 81 mg and Brilinta 90 mg twice daily  2.  Continue aggressive secondary risk factor modification along with guideline directed medical therapy as previously prescribed  3.  Post procedure orders.      Yue Bartlett MD, DOT, FACC, [facility]  Interventional Cardiology  [facility]  [phone] Main central office  [phone] [place] office  2/12/2024  3:59 PM

## 2024-02-13 ENCOUNTER — APPOINTMENT (OUTPATIENT)
Dept: VASCULAR LAB | Age: 74
DRG: 322 | End: 2024-02-13
Payer: MEDICARE

## 2024-02-13 VITALS
BODY MASS INDEX: 25.74 KG/M2 | OXYGEN SATURATION: 100 % | HEIGHT: 64 IN | HEART RATE: 85 BPM | DIASTOLIC BLOOD PRESSURE: 59 MMHG | SYSTOLIC BLOOD PRESSURE: 121 MMHG | WEIGHT: 150.79 LBS | TEMPERATURE: 97.2 F | RESPIRATION RATE: 10 BRPM

## 2024-02-13 PROBLEM — T14.8XXA HEMATOMA: Status: ACTIVE | Noted: 2024-02-13

## 2024-02-13 LAB
ANION GAP SERPL CALCULATED.3IONS-SCNC: 9 MMOL/L (ref 3–16)
BUN SERPL-MCNC: 20 MG/DL (ref 7–20)
CALCIUM SERPL-MCNC: 8.8 MG/DL (ref 8.3–10.6)
CHLORIDE SERPL-SCNC: 105 MMOL/L (ref 99–110)
CO2 SERPL-SCNC: 24 MMOL/L (ref 21–32)
CREAT SERPL-MCNC: 0.8 MG/DL (ref 0.6–1.2)
DEPRECATED RDW RBC AUTO: 16.9 % (ref 12.4–15.4)
EKG ATRIAL RATE: 77 BPM
EKG DIAGNOSIS: NORMAL
EKG P AXIS: 71 DEGREES
EKG P-R INTERVAL: 138 MS
EKG Q-T INTERVAL: 388 MS
EKG QRS DURATION: 78 MS
EKG QTC CALCULATION (BAZETT): 439 MS
EKG R AXIS: 73 DEGREES
EKG T AXIS: 56 DEGREES
EKG VENTRICULAR RATE: 77 BPM
GFR SERPLBLD CREATININE-BSD FMLA CKD-EPI: >60 ML/MIN/{1.73_M2}
GLUCOSE SERPL-MCNC: 92 MG/DL (ref 70–99)
HCT VFR BLD AUTO: 27.6 % (ref 36–48)
HGB BLD-MCNC: 9.4 G/DL (ref 12–16)
MAGNESIUM SERPL-MCNC: 2.1 MG/DL (ref 1.8–2.4)
MCH RBC QN AUTO: 29.4 PG (ref 26–34)
MCHC RBC AUTO-ENTMCNC: 33.9 G/DL (ref 31–36)
MCV RBC AUTO: 86.8 FL (ref 80–100)
PHOSPHATE SERPL-MCNC: 3.6 MG/DL (ref 2.5–4.9)
PLATELET # BLD AUTO: 140 K/UL (ref 135–450)
PMV BLD AUTO: 8.5 FL (ref 5–10.5)
POC ACT LR: 278 SEC
POC ACT LR: >400 SEC
POTASSIUM SERPL-SCNC: 4 MMOL/L (ref 3.5–5.1)
RBC # BLD AUTO: 3.18 M/UL (ref 4–5.2)
SODIUM SERPL-SCNC: 138 MMOL/L (ref 136–145)
TROPONIN, HIGH SENSITIVITY: 102 NG/L (ref 0–14)
TROPONIN, HIGH SENSITIVITY: 90 NG/L (ref 0–14)
TROPONIN, HIGH SENSITIVITY: 93 NG/L (ref 0–14)
WBC # BLD AUTO: 5.1 K/UL (ref 4–11)

## 2024-02-13 PROCEDURE — 83735 ASSAY OF MAGNESIUM: CPT

## 2024-02-13 PROCEDURE — 80048 BASIC METABOLIC PNL TOTAL CA: CPT

## 2024-02-13 PROCEDURE — 99233 SBSQ HOSP IP/OBS HIGH 50: CPT | Performed by: NURSE PRACTITIONER

## 2024-02-13 PROCEDURE — 6370000000 HC RX 637 (ALT 250 FOR IP): Performed by: INTERNAL MEDICINE

## 2024-02-13 PROCEDURE — 84100 ASSAY OF PHOSPHORUS: CPT

## 2024-02-13 PROCEDURE — 6370000000 HC RX 637 (ALT 250 FOR IP): Performed by: NURSE PRACTITIONER

## 2024-02-13 PROCEDURE — 84484 ASSAY OF TROPONIN QUANT: CPT

## 2024-02-13 PROCEDURE — 85027 COMPLETE CBC AUTOMATED: CPT

## 2024-02-13 PROCEDURE — 93010 ELECTROCARDIOGRAM REPORT: CPT | Performed by: INTERNAL MEDICINE

## 2024-02-13 PROCEDURE — 93931 UPPER EXTREMITY STUDY: CPT

## 2024-02-13 PROCEDURE — 36415 COLL VENOUS BLD VENIPUNCTURE: CPT

## 2024-02-13 RX ORDER — FERROUS SULFATE 325(65) MG
325 TABLET ORAL
Status: DISCONTINUED | OUTPATIENT
Start: 2024-02-14 | End: 2024-02-13

## 2024-02-13 RX ORDER — FERROUS SULFATE 325(65) MG
325 TABLET ORAL
Qty: 30 TABLET | Refills: 3 | Status: SHIPPED | OUTPATIENT
Start: 2024-02-14 | End: 2024-02-13 | Stop reason: HOSPADM

## 2024-02-13 RX ORDER — FERROUS SULFATE 325(65) MG
325 TABLET ORAL
Qty: 30 TABLET | Refills: 3 | Status: SHIPPED | OUTPATIENT
Start: 2024-02-14

## 2024-02-13 RX ORDER — FERROUS SULFATE 325(65) MG
325 TABLET ORAL
Status: DISCONTINUED | OUTPATIENT
Start: 2024-02-13 | End: 2024-02-13 | Stop reason: HOSPADM

## 2024-02-13 RX ADMIN — TICAGRELOR 90 MG: 90 TABLET ORAL at 08:42

## 2024-02-13 RX ADMIN — FERROUS SULFATE TAB 325 MG (65 MG ELEMENTAL FE) 325 MG: 325 (65 FE) TAB at 12:45

## 2024-02-13 RX ADMIN — LISINOPRIL 2.5 MG: 2.5 TABLET ORAL at 08:31

## 2024-02-13 RX ADMIN — METOPROLOL SUCCINATE 25 MG: 25 TABLET, EXTENDED RELEASE ORAL at 08:32

## 2024-02-13 RX ADMIN — ASPIRIN 81 MG 81 MG: 81 TABLET ORAL at 08:32

## 2024-02-13 NOTE — CARE COORDINATION
CASE MANAGEMENT DISCHARGE SUMMARY      Discharge to: Home    IMM given: today 2/13/24     Transportation:    Family/car:yes     Confirmed discharge plan with:     Patient: yes/     Family:  yes daughter spoke to Mireya    Name: Contact number:     Facility/Agency, name:  [facility] faxed   Phone number for report to facility:      RN, name: Flavia Ernst RN    Note: Discharging nurse to complete MITCH, reconcile [facility], and place final copy with patient's discharge packet. RN to ensure that written prescriptions for  Level II medications are sent with patient to the facility as per protocol.        
Daughter Cristal called this am concerned about her Mom's safety going home. She said her Mom takes care of her Dad who is paraplegic and she and her sister help too. Cristal also said her Mom said she was feeling dizzy.  I transferred the call to Mireya the RN to speak to the daughter , Cristal about her concerns and the pt's restrictions.   
require precert for SNF: No    Potential assistance Purchasing Medications:    Meds-to-Beds request: Yes      [id number] - [place], [place] - [address] P [phone] - F [phone]  [address] [address]  Phone: [phone] Fax: [phone]    [id number] - [place], [place] - [address] - P [phone] - F [phone]  [address] [address]  Phone: [phone] Fax: [phone]    [facility] [address] [place] [facility] - [place], [place] - [address] P [phone] - F [phone]  [address] [address]  Phone: [phone] Fax: [phone]      Notes:    Factors facilitating achievement of predicted outcomes: Family support, Cooperative, Pleasant, and Good insight into deficits    Barriers to discharge: none    Additional Case Management Notes: Pt lives in a 2 story home with her . She is IPTA. Her  is wheelchair bound following an accident in March 2020. They are in the process of building a ranch home that will be able to accommodate her . She has aid services for him 6 days a week and her daughters can help care for him if she needs it. Will continue to follow course for needs. Nenita Fisher RN     The Plan for Transition of Care is related to the following treatment goals of Atypical chest pain [R07.89]    IF APPLICABLE: The Patient and/or patient representative Bren and her family were provided with a choice of provider and agrees with the discharge plan. Freedom of choice list with basic dialogue that supports the patient's individualized plan of care/goals and shares the quality data associated with the providers was provided to:     Patient Representative Name:       The Patient and/or Patient Representative Agree with the Discharge Plan?      Nenita Fisher RN  Case Management Department

## 2024-02-13 NOTE — DISCHARGE SUMMARY
2/10/2024  EXAMINATION: TWO XRAY VIEWS OF THE CHEST 2/10/2024 3:01 pm COMPARISON: May 26, 2021 and CT from August 27, 2021 HISTORY: ORDERING SYSTEM PROVIDED HISTORY: chest pain TECHNOLOGIST PROVIDED HISTORY: Reason for exam:->chest pain Reason for Exam: chest pains for the past hour FINDINGS: Hyperinflation of the lungs.  No confluent infiltrate, effusion, or pneumothorax identified.  Scarring in the right upper lobe.  Biapical lung scarring.Cardiac and mediastinal silhouettes are within normal limits. Acute osseous abnormality identified.  Intervertebral disc space narrowing and endplate osteophytes at multiple levels in the thoracic spine. Calcified gallstones in the right upper quadrant.     Hyperinflation.  No acute cardiopulmonary disease identified.       Consults:     IP CONSULT TO CARDIOLOGY  IP CONSULT TO SPIRITUAL SERVICES  IP CONSULT TO CARDIAC REHAB  IP CONSULT TO CARDIAC REHAB    Labs:     Recent Labs     02/11/24  0805 02/12/24  0948 02/13/24  0816   WBC 3.0* 3.3* 5.1   HGB 9.4* 10.8* 9.4*   HCT 29.0* 33.4* 27.6*   * 146 140     Recent Labs     02/11/24  0805 02/12/24  0948 02/13/24  0816    139 138   K 3.9 3.8 4.0    106 105   CO2 26 25 24   BUN 19 17 20   CREATININE 0.8 1.0 0.8   CALCIUM 8.5 8.9 8.8   MG 2.10 2.10 2.10   PHOS 3.1 2.8 3.6     Recent Labs     02/12/24  0949 02/13/24  0131 02/13/24  0816   TROPHS 18* 90* 102*     Recent Labs     02/11/24  0805   LABA1C 5.3     Recent Labs     02/10/24  1444   AST 25   ALT 8*   BILITOT 0.4   ALKPHOS 149*     Recent Labs     02/10/24  2156   INR 1.13       Urine Cultures: No results found for: \"LABURIN\"  Blood Cultures:   Lab Results   Component Value Date/Time    BC No growth after 5 days of incubation. 08/30/2019 01:12 PM     No results found for: \"BLOODCULT2\"  Organism: No results found for: \"ORG\"    Signed:    Cornelia Hoover, APRN - CNP

## 2024-02-13 NOTE — PROGRESS NOTES
[facility]   Cardiovascular Evaluation    PATIENT: Bren Vazquez  DATE: 2024  MRN: [id number]  CSN: [id number]  : 1950    Primary Care Doctor: Arcenio Desir MD    Reason for evaluation/Chief complaint:   Chest Pain (Chest pain started 1 hour ago. Radiating to left arm and jaw. 4/10 pain. Took 2 SL nitro tablets that relieved after the second. Hx stent)        Subjective:    History of present illness on initial date of evaluation:   Bren Vazquez is a 73 y.o. patient who presents for palpitations with associated chest pain.  The patient states that her symptom complex occurred at rest and was similar to her presentation in  at which time she had stenting completed to the left anterior descending artery.  Patient states that her symptoms started with her heart racing, followed by tightness in the chest and subsequently radiation to the left arm.  The symptoms were severe in nature.  This prompted her to seek medical evaluation within the emergency room.  She was seen and evaluated and found to have elevated troponin levels and subsequent admitted to hospital for concerns of acute coronary syndrome.    From our previous notes completed in prior settings ;\" she has a past medical history including coronary artery disease, hypertension, and ischemic cardiomyopathy. She presented to the emergency department 2015 with chest pain. She underwent stress test 2015 mixed ischemia and scar shown in apex and adjacent anteroseptal segment. Therefore she underwent left heart catheterization 2015 with PCI to LAD with HERNANDO and PCI to Diag 2 w/ballon PTCA. Her echocardiogram 10/15/2015 showed EF 55-60% and reversal of E/A inflow velocities across the mitral valve suggesting impaired left ventricular relaxation.\"  She had a fall in 2021 that resulted in a right femur fracture that required surgical intervention. Today she states she is doing well. Patient currently 
   02/12/24 1043   Encounter Summary   Encounter Overview/Reason  Initial Encounter   Service Provided For: Patient   Referral/Consult From: Nurse   Support System Spouse;Children   Last Encounter  02/12/24  (This morning, I had the pleasure of meeting with patient at her bedside.)   Complexity of Encounter Low   Begin Time 0905   End Time  0930   Total Time Calculated 25 min   Spiritual/Emotional needs   Type Spiritual Support   Rituals, Rites and Sacraments   Type Blessings   Advance Care Planning   Type ACP conversation  (Documents left with patient. ACP note completed.)   Assessment/Intervention/Outcome   Assessment Calm;Coping;Hopeful;Peaceful   Intervention Active listening;Discussed belief system/Latter-day practices/iftikhar;Discussed illness injury and it’s impact;Discussed relationship with God;Prayer (assurance of)/[place];Sustaining Presence/Ministry of presence   Outcome Comfort;Coping;Encouraged;Engaged in conversation;Expressed feelings, needs, and concerns;Peace;Optimistic;Receptive   Plan and Referrals   Plan/Referrals Continue to visit, (comment)  (Recommend follow up visit to continue ACP discussion.)       
  Hospital Medicine Progress Note      Date of Admission: 2/10/2024  Hospital Day: 2    Chief Admission Complaint:     Chest Pain (Chest pain started 1 hour ago. Radiating to left arm and jaw. 4/10 pain. Took 2 SL nitro tablets that relieved after the second. Hx stent)       Subjective:     Patient denies c/p, chest pressure or discomfort this morning. She is having no palpitations or edema. She has many questions as her friends are in the medical field and sent her a list to ask. We discuss what we know so far and we await Cardiology input.     Presenting Admission History:       This is a 73-year-old female with past medical history of renal cell carcinoma of the right kidney hypertension hyperlipidemia CAD s/p PCI ischemic cardiomyopathy with a EF of 45% presented to the hospital because of chest pain that started 1 hour prior to coming to the hospital radiating to the left arm and jaw 4 x 10 intensity improved sublingual nitro that helped the patient denies any shortness of breath nausea vomiting diarrhea constipation dizziness or lightheadedness.     On further investigation it was identified with the patient had a diarrhea episode started 5 days ago. Donated blood yesterday. Food poisoning 5 days ago and is as anxiety component as well making sounds of the tachycardia the patient is suffering from not to complete the workup. Plan to do a CT scan of the chest and rule out COVID-19 and flu. COVID-19 might come out to be positive but keep in mind that the COVID-19 test was positive medication in this no symptoms from COVID standpoint at this moment.     Assessment/Plan:      Current Principal Problem:  Atypical chest pain    Atypical chest pain, ACS rule out.  -Troponin 21 ---->37 ---->30  -Cardiology consulted; appreciate their input  -Continue telemetry monitoring  -Echo ordered  -Continue aspirin and statin.  -Heparin drip started this morning.  -N.p.o. Sunday night for possible stress test on Monday. Awaiting 
  Hospital Medicine Progress Note      Date of Admission: 2/10/2024  Hospital Day: 3    Chief Admission Complaint:     Chest Pain (Chest pain started 1 hour ago. Radiating to left arm and jaw. 4/10 pain. Took 2 SL nitro tablets that relieved after the second. Hx stent)       Subjective:     Patient denies c/p, chest pressure or discomfort this morning. She is having no palpitations or edema. She tells me Dr. Bartlett saw her this morning and plan is for cardiac cath later this afternoon.       Presenting Admission History:       This is a 73-year-old female with past medical history of renal cell carcinoma of the right kidney hypertension hyperlipidemia CAD s/p PCI ischemic cardiomyopathy with a EF of 45% presented to the hospital because of chest pain that started 1 hour prior to coming to the hospital radiating to the left arm and jaw 4 x 10 intensity improved sublingual nitro that helped the patient denies any shortness of breath nausea vomiting diarrhea constipation dizziness or lightheadedness.     On further investigation it was identified with the patient had a diarrhea episode started 5 days ago. Donated blood yesterday. Food poisoning 5 days ago and is as anxiety component as well making sounds of the tachycardia the patient is suffering from not to complete the workup. Plan to do a CT scan of the chest and rule out COVID-19 and flu. COVID-19 might come out to be positive but keep in mind that the COVID-19 test was positive medication in this no symptoms from COVID standpoint at this moment.     Assessment/Plan:      Current Principal Problem:  Atypical chest pain    Atypical chest pain, ACS rule out.  CAD s/p stent in 2015  -Troponin 21 ---->37 ---->30---->19  -Cardiology consulted; appreciate their input  -Continue telemetry monitoring  -Echo ordered  -Continue aspirin and statin.  -Heparin drip started this morning.  -N.p.o. for cardiac catheterization/angiogram later today  - A1c pending  - Lipid panel Chol 
2230- Assessed right femoral puncture site: noted small bleeding on gauze dressing. Informed Charge Nurse Catherine and came to bedside. With CN, opened dressing and assessed site. Noted small hematoma and slow minimal active bleeding. Applied direct pressure over the puncture site and pulses were checked by CN at the same time. Bleeding stopped and dressing change is done. Noted good pulse on RLE. Plan of care ongoing.  
4 Eyes Skin Assessment     NAME:  Bren Vazquez  YOB: 1950  MEDICAL RECORD NUMBER:  [id number]    The patient is being assessed for  Admission    I agree that at least one RN has performed a thorough Head to Toe Skin Assessment on the patient. ALL assessment sites listed below have been assessed.      Areas assessed by both nurses:    Head, Face, Ears, Shoulders, Back, Chest, Arms, Elbows, Hands, Sacrum. Buttock, Coccyx, Ischium, and Legs. Feet and Heels        Does the Patient have a Wound? No noted wound(s)       Killian Prevention initiated by RN: Yes  Wound Care Orders initiated by RN: No    Pressure Injury (Stage 3,4, Unstageable, DTI, NWPT, and Complex wounds) if present, place Wound referral order by RN under : No    New Ostomies, if present place, Ostomy referral order under : No     Nurse 1 eSignature: Electronically signed by Noni Muñoz RN on 2/10/24 at 10:42 PM EST    **SHARE this note so that the co-signing nurse can place an eSignature**    Nurse 2 eSignature: {Esignature:[id number]}   
Admitted patient from [facility]. A/O x4, breathing spontaneously on RA. Noted cold bruised, red,  purple and swollen right lower arm and hand. Noted +2 right radial pulse. Patient denies numbness and tingling on RUE. Kept splint on right arm. Noted small bruising and slightly tender right fem puncture site: [facility] that since in the cath[facility]. Noted good pulses on RLE. CHG bathing and 4eyes assessment done. Oriented patient to ICU set-up including use of call button and visiting time. Plan of care ongoing.  
Assessment as charted. RUE with bruising, hematoma, cool. + pulse. Denies pain unless pressure applied. States looks better than it did. Edema decreased.   
B3 RN bringing patient belongings to cath lab  
Bedside report given in room 236.  
Cross cover notified that patient troponin elevated from previous.  Patient admitted for atypical chest pain. Does have a history of CAD.  Troponin elevation from 21>>24>>37. D/t steady incline of  troponin, heparin gtt initiated. Cardiology has already been consulted. Currently denies any chest pain.   Renetta Galvez, APRN - CNP    
Dc instructions reviewed with patient and family. No questions. IV removed.   
Patient arm red, bruised, swollen. Hand swollen and purple still. Pain but no numbness or tingling. Able to doppler pulses. Hand remains elevated. Dr. Vasquez and Zbigniew RT at bedside. TR band removed completely. Very small amount bleeding, oozing from site. Small gauze taped to arm. Hand more pink, remains swollen and painful.  
Pts belongings brought to cath lab room 5.    
Report given to ICU RN , all questions addressed.  
Shift: [id number]    Admitting diagnosis: Atypical CP    Presentation to hospital: c/o CP radiating to arm and jaw    Surgery: yes - left heart cath, Bifurcation stenting of the LAD and diagonal branch utilizing a DK crush technique 2/12     Nursing assessment at handoff  stable    Emergency Contact/POA: [place]-   Family updated: no    Most recent vitals: BP (!) 140/67   Pulse 87   Temp 97.5 °F (36.4 °C) (Oral)   Resp 20   Ht 1.626 m (5' 4\")   Wt 67.4 kg (148 lb 11.2 oz)   SpO2 99%   BMI 25.52 kg/m²      Rhythm: Normal Sinus Rhythm      NC/HFNC- 0 lpm  Respiratory support: - No ventilator support    Vent days: Day     Increased O2 requirements: no    Admission weight Weight - Scale: 64.9 kg (143 lb)  Today's weight   Wt Readings from Last 1 Encounters:   02/13/24 68.4 kg (150 lb 12.7 oz)         UOP >30ml/hr: n/a    Villagomez need assessed each shift: no    Restraints: no  Order current and documentation up to date?    Lines/Drains  LDA Insertion Date Discontinued Date Dressing Changes   PIV       TLC       Arterial       Villagomez       Vas Cath      ETT       Surgical drains        Night Shift Hospitalist Interventions    Problem(Brief) Date Time Intervention Physician contacted                                               Drip rates at handoff:    sodium chloride 100 mL/hr at 02/12/24 1255    sodium chloride         Hospital Course Daily Updates:  Admit Day# 0 2/12/24 PM  -Admitted from cathlab: Left heart catheterization,Bifurcation stenting of the LAD and diagonal branch utilizing a DK crush technique, Angio-Seal device deployment: right fem approach   -Rt radial approach attempts x2: (+)hematoma, swelling wherein cathlab RN needs to put direct pressure for 4hours? + 2 TR band placed, referred to MD:MD did doppler pulse okay to remove TR band and elevate RUE.   -0600: RUE is less swollen, no further bruising/hematoma, less painful when touch  -0600: RLE has no change after last dressing change, noted 
Still unable to remove any air without site becoming more swollen- requested other cath lab RN's to inspect radial site and  notified of situation. Site remained elevated on two pillows.  1 radial band removed, the second radial band repositioned. 1 TR band remains in place. Good capillary refill, good perfusion on pulse ox. Arm remains elevated on 2 pillows. Dr. Bartlett notified via telephone and suggested ordering  ACT and not removing air until number is below 180.  
Ultrasound at bedside performing doppler.  
Regular rhythm and normal rate; S1, S2 are normal; no murmur noted; no rub or gallop   Abdomen:   Soft, non-tender, bowel sounds active all four quadrants,  no masses, no organomegaly           Extremities: Extremities normal, atraumatic, no cyanosis or edema   Pulses: 2+ and symmetric   Skin: Skin color, texture, turgor normal, no rashes or lesions   Pysch: Normal mood and affect   Neurologic: Normal gross motor and sensory exam.         Labs  Recent Labs     02/12/24  0948 02/13/24  0816   WBC 3.3* 5.1   HGB 10.8* 9.4*   HCT 33.4* 27.6*   MCV 88.1 86.8    140       Recent Labs     02/12/24  0948 02/13/24  0816   CREATININE 1.0 0.8   BUN 17 20    138   K 3.8 4.0    105   CO2 25 24       Recent Labs     02/10/24  2156   INR 1.13   PROTIME 14.6       No results for input(s): \"TROPONINI\" in the last 72 hours.  Invalid input(s): \"PRO-BNP\"  Recent Labs     02/11/24  0805   CHOL 108   HDL 40           Imaging:  I have reviewed the below testing personally and my interpretation is below.  EKG:  Sinus tachycardia  Otherwise normal ECG  When compared with ECG of 26-MAY-2021 16:46,  Vent. rate has increased BY 47 BPM  Confirmed by MILENA GIANG ([id number]) on 2/10/2024 5:45:03 PM      CXR:      Assessment:  73 y.o. patient with:  Problem List Items Addressed This Visit       Chest pain - Primary    NSTEMI      Plan:  1. I had the opportunity to review the Bren Vazquez clinical presentation and the available pertinent data. With the patient's clinical risk factors and symptoms, there is a high pretest likelihood for CAD. I have asked Bren Vazquez to undergo cardiac angiography for further diagnostic testing.    The procedure was explained in depth. All questions and alternate treatment strategies were discussed. The patient agrees to proceed. They understand all the risks associated with the procedure, including myocardial infarction, stroke, death, vascular complications, and the possible need for emergent 
procedures  ( X) Documentation within the EHR  (X) providing patient education  Electronically signed by DIANE M ENZWEILER, APRN - CNP on 2/13/2024 at 12:24 PM

## 2024-02-13 NOTE — PLAN OF CARE
Problem: Discharge Planning  Goal: Discharge to home or other facility with appropriate resources  2/13/2024 0024 by Mulugeta Wilkerson, RN  Outcome: Progressing  Flowsheets (Taken 2/12/2024 2100)  Discharge to home or other facility with appropriate resources:   Identify barriers to discharge with patient and caregiver   Arrange for needed discharge resources and transportation as appropriate   Identify discharge learning needs (meds, wound care, etc)   Refer to discharge planning if patient needs post-hospital services based on physician order or complex needs related to functional status, cognitive ability or social support system     Problem: Cardiovascular - Adult  Goal: Maintains optimal cardiac output and hemodynamic stability  2/13/2024 0024 by Mulugeta Wilkerson, RN  Outcome: Progressing  Flowsheets (Taken 2/12/2024 2100)  Maintains optimal cardiac output and hemodynamic stability:   Monitor blood pressure and heart rate   Assess for signs of decreased cardiac output    Problem: Hematologic - Adult  Goal: Maintains hematologic stability  Recent Flowsheet Documentation  Taken 2/12/2024 2100 by Mulugeta Wilkerson RN  Maintains hematologic stability: Assess for signs and symptoms of bleeding or hemorrhage     Problem: Skin/Tissue Integrity  Goal: Absence of new skin breakdown  Description: 1.  Monitor for areas of redness and/or skin breakdown  2.  Assess vascular access sites hourly  3.  Every 4-6 hours minimum:  Change oxygen saturation probe site  4.  Every 4-6 hours:  If on nasal continuous positive airway pressure, respiratory therapy assess nares and determine need for appliance change or resting period.  Outcome: Progressing     Problem: Pain  Goal: Verbalizes/displays adequate comfort level or baseline comfort level  Outcome: Progressing  Flowsheets (Taken 2/12/2024 2100)  Verbalizes/displays adequate comfort level or baseline comfort level:   Encourage patient to monitor pain

## 2024-02-20 LAB — POC ACT LR: >400 SEC

## 2024-02-20 NOTE — PROGRESS NOTES
visually estimated ejection fraction of 55%. No obvious regional wall motion abnormalities noted. Normal left ventricular diastolic function. The right ventricle is normal in size with normal systolic function. Trace mitral and tricuspid regurgitation is present. Systolic pulmonary artery pressure (SPAP) is normal and estimated at 19+mmHg (right atrial pressure 3 mmHg    Assessment:    1. NSTEMI (non-ST elevated myocardial infarction) (HCC)  -admitted with chest pain and NSTEMI 2/2024  -2/12/24: NEO crush bifurcation stent of LAD and Dg  -nothing to suggest angina  -continue DAPT, statin and BB  -risk factor modification  -refer to cardiac rehab    2. Coronary artery disease involving native coronary artery of native heart without angina pectoris  -known CAD with prior PCI of LAD in 2015  -on 2/12/24: NEO LARSON crush bifurcation stent of LAD and Dg with residual moderate nonobstructive RCA and LCX disease  -no recurrent angina  -continue DAPT, BB and statin    3. Primary hypertension  -well controlled  -goal < 130/80 and @ goal  -continue medical management    4. Hyperlipidemia LDL goal <70  -continue statin  -@ goal LDL on recent lipids    Plan:  Change Brilinta (once current Rx completed) to Plavix d/t cost: take 600 mg plavix on day#1 and then 75 mg daily  Continue statin, lisinopril, Toprol, Brilinta (until gone), ASA  Discussed low fat/low sodium diet and reinforced regular aerobic exercise.  Refer to cardiac rehab  45 minutes spent with Bren and her daughter today addressing questions regarding her testing, angiogram findings, reviewing meds, activity limitations, etc.  Multiple questions answered. Pt education provided  Follow up with DENP in 3-4 months or sooner if needed    Return in about 3 months (around 5/21/2024) for 3-4 months with DENP.     Thanks for allowing me to participate in the care of this patient.      Diane Enzweiler, INNA-CNP  [facility] [place], [place]  [address]

## 2024-02-21 ENCOUNTER — OFFICE VISIT (OUTPATIENT)
Dept: CARDIOLOGY CLINIC | Age: 74
End: 2024-02-21
Payer: MEDICARE

## 2024-02-21 VITALS
DIASTOLIC BLOOD PRESSURE: 64 MMHG | BODY MASS INDEX: 24.67 KG/M2 | OXYGEN SATURATION: 99 % | WEIGHT: 144.5 LBS | SYSTOLIC BLOOD PRESSURE: 106 MMHG | HEART RATE: 92 BPM | HEIGHT: 64 IN

## 2024-02-21 DIAGNOSIS — I21.4 NSTEMI (NON-ST ELEVATED MYOCARDIAL INFARCTION) (HCC): Primary | ICD-10-CM

## 2024-02-21 DIAGNOSIS — I25.10 CORONARY ARTERY DISEASE INVOLVING NATIVE CORONARY ARTERY OF NATIVE HEART WITHOUT ANGINA PECTORIS: ICD-10-CM

## 2024-02-21 DIAGNOSIS — E78.5 HYPERLIPIDEMIA LDL GOAL <70: ICD-10-CM

## 2024-02-21 DIAGNOSIS — I10 PRIMARY HYPERTENSION: ICD-10-CM

## 2024-02-21 PROCEDURE — 1090F PRES/ABSN URINE INCON ASSESS: CPT | Performed by: NURSE PRACTITIONER

## 2024-02-21 PROCEDURE — 1036F TOBACCO NON-USER: CPT | Performed by: NURSE PRACTITIONER

## 2024-02-21 PROCEDURE — 3078F DIAST BP <80 MM HG: CPT | Performed by: NURSE PRACTITIONER

## 2024-02-21 PROCEDURE — G8420 CALC BMI NORM PARAMETERS: HCPCS | Performed by: NURSE PRACTITIONER

## 2024-02-21 PROCEDURE — 3074F SYST BP LT 130 MM HG: CPT | Performed by: NURSE PRACTITIONER

## 2024-02-21 PROCEDURE — G8427 DOCREV CUR MEDS BY ELIG CLIN: HCPCS | Performed by: NURSE PRACTITIONER

## 2024-02-21 PROCEDURE — G8400 PT W/DXA NO RESULTS DOC: HCPCS | Performed by: NURSE PRACTITIONER

## 2024-02-21 PROCEDURE — 1123F ACP DISCUSS/DSCN MKR DOCD: CPT | Performed by: NURSE PRACTITIONER

## 2024-02-21 PROCEDURE — 99215 OFFICE O/P EST HI 40 MIN: CPT | Performed by: NURSE PRACTITIONER

## 2024-02-21 PROCEDURE — 1111F DSCHRG MED/CURRENT MED MERGE: CPT | Performed by: NURSE PRACTITIONER

## 2024-02-21 PROCEDURE — G8484 FLU IMMUNIZE NO ADMIN: HCPCS | Performed by: NURSE PRACTITIONER

## 2024-02-21 PROCEDURE — 3017F COLORECTAL CA SCREEN DOC REV: CPT | Performed by: NURSE PRACTITIONER

## 2024-02-21 RX ORDER — CLOPIDOGREL BISULFATE 75 MG/1
TABLET ORAL
Qty: 90 TABLET | Refills: 3 | Status: SHIPPED | OUTPATIENT
Start: 2024-02-21

## 2024-02-22 ENCOUNTER — TELEPHONE (OUTPATIENT)
Dept: CARDIOLOGY CLINIC | Age: 74
End: 2024-02-22

## 2024-02-22 NOTE — TELEPHONE ENCOUNTER
Submitted PA for CLOPIDOGREL  Via [facility] Key: [id number] STATUS: PA NOT REQUIRED.    Follow up done daily; if no decision with in three days we will refax.  If another three days goes by with no decision will call the insurance for status.

## 2024-03-06 ENCOUNTER — HOSPITAL ENCOUNTER (OUTPATIENT)
Dept: CARDIAC REHAB | Age: 74
Setting detail: THERAPIES SERIES
Discharge: HOME | End: 2024-03-06
Payer: MEDICARE

## 2024-03-06 PROCEDURE — 93798 PHYS/QHP OP CAR RHAB W/ECG: CPT

## 2024-03-08 ENCOUNTER — HOSPITAL ENCOUNTER (OUTPATIENT)
Dept: CARDIAC REHAB | Age: 74
Setting detail: THERAPIES SERIES
Discharge: HOME | End: 2024-03-08
Payer: MEDICARE

## 2024-03-08 PROCEDURE — 93798 PHYS/QHP OP CAR RHAB W/ECG: CPT

## 2024-03-11 ENCOUNTER — HOSPITAL ENCOUNTER (OUTPATIENT)
Dept: CARDIAC REHAB | Age: 74
Setting detail: THERAPIES SERIES
Discharge: HOME | End: 2024-03-11
Payer: MEDICARE

## 2024-03-11 PROCEDURE — 93798 PHYS/QHP OP CAR RHAB W/ECG: CPT

## 2024-03-13 ENCOUNTER — HOSPITAL ENCOUNTER (OUTPATIENT)
Dept: CARDIAC REHAB | Age: 74
Setting detail: THERAPIES SERIES
Discharge: HOME | End: 2024-03-13
Payer: MEDICARE

## 2024-03-13 PROCEDURE — 93798 PHYS/QHP OP CAR RHAB W/ECG: CPT

## 2024-03-15 ENCOUNTER — HOSPITAL ENCOUNTER (OUTPATIENT)
Dept: CARDIAC REHAB | Age: 74
Setting detail: THERAPIES SERIES
Discharge: HOME | End: 2024-03-15
Payer: MEDICARE

## 2024-03-15 PROCEDURE — 93798 PHYS/QHP OP CAR RHAB W/ECG: CPT

## 2024-03-18 ENCOUNTER — HOSPITAL ENCOUNTER (OUTPATIENT)
Dept: CARDIAC REHAB | Age: 74
Setting detail: THERAPIES SERIES
Discharge: HOME | End: 2024-03-18
Payer: MEDICARE

## 2024-03-18 PROCEDURE — 93798 PHYS/QHP OP CAR RHAB W/ECG: CPT

## 2024-03-20 ENCOUNTER — HOSPITAL ENCOUNTER (OUTPATIENT)
Dept: CARDIAC REHAB | Age: 74
Setting detail: THERAPIES SERIES
Discharge: HOME | End: 2024-03-20
Payer: MEDICARE

## 2024-03-20 PROCEDURE — 93798 PHYS/QHP OP CAR RHAB W/ECG: CPT

## 2024-03-22 ENCOUNTER — HOSPITAL ENCOUNTER (OUTPATIENT)
Dept: CARDIAC REHAB | Age: 74
Setting detail: THERAPIES SERIES
Discharge: HOME | End: 2024-03-22
Payer: MEDICARE

## 2024-03-22 PROCEDURE — 93798 PHYS/QHP OP CAR RHAB W/ECG: CPT

## 2024-03-25 ENCOUNTER — HOSPITAL ENCOUNTER (OUTPATIENT)
Dept: CARDIAC REHAB | Age: 74
Setting detail: THERAPIES SERIES
Discharge: HOME | End: 2024-03-25
Payer: MEDICARE

## 2024-03-25 PROCEDURE — 93798 PHYS/QHP OP CAR RHAB W/ECG: CPT

## 2024-03-27 ENCOUNTER — HOSPITAL ENCOUNTER (OUTPATIENT)
Dept: CARDIAC REHAB | Age: 74
Setting detail: THERAPIES SERIES
Discharge: HOME | End: 2024-03-27
Payer: MEDICARE

## 2024-03-27 PROCEDURE — 93798 PHYS/QHP OP CAR RHAB W/ECG: CPT

## 2024-03-29 ENCOUNTER — HOSPITAL ENCOUNTER (OUTPATIENT)
Dept: CARDIAC REHAB | Age: 74
Setting detail: THERAPIES SERIES
Discharge: HOME | End: 2024-03-29
Payer: MEDICARE

## 2024-03-29 PROCEDURE — 93798 PHYS/QHP OP CAR RHAB W/ECG: CPT

## 2024-04-01 ENCOUNTER — HOSPITAL ENCOUNTER (OUTPATIENT)
Dept: CARDIAC REHAB | Age: 74
Setting detail: THERAPIES SERIES
Discharge: HOME | End: 2024-04-01
Payer: MEDICARE

## 2024-04-01 PROCEDURE — 93798 PHYS/QHP OP CAR RHAB W/ECG: CPT

## 2024-04-03 ENCOUNTER — HOSPITAL ENCOUNTER (OUTPATIENT)
Dept: CARDIAC REHAB | Age: 74
Setting detail: THERAPIES SERIES
Discharge: HOME | End: 2024-04-03
Payer: MEDICARE

## 2024-04-03 PROCEDURE — 93798 PHYS/QHP OP CAR RHAB W/ECG: CPT

## 2024-04-05 ENCOUNTER — HOSPITAL ENCOUNTER (OUTPATIENT)
Dept: CARDIAC REHAB | Age: 74
Setting detail: THERAPIES SERIES
Discharge: HOME | End: 2024-04-05
Payer: MEDICARE

## 2024-04-05 PROCEDURE — 93798 PHYS/QHP OP CAR RHAB W/ECG: CPT

## 2024-04-08 ENCOUNTER — HOSPITAL ENCOUNTER (OUTPATIENT)
Dept: CARDIAC REHAB | Age: 74
Setting detail: THERAPIES SERIES
Discharge: HOME | End: 2024-04-08
Payer: MEDICARE

## 2024-04-08 PROCEDURE — 93798 PHYS/QHP OP CAR RHAB W/ECG: CPT

## 2024-04-10 ENCOUNTER — HOSPITAL ENCOUNTER (OUTPATIENT)
Dept: CARDIAC REHAB | Age: 74
Setting detail: THERAPIES SERIES
Discharge: HOME | End: 2024-04-10
Payer: MEDICARE

## 2024-04-10 PROCEDURE — 93798 PHYS/QHP OP CAR RHAB W/ECG: CPT

## 2024-04-12 ENCOUNTER — HOSPITAL ENCOUNTER (OUTPATIENT)
Dept: CARDIAC REHAB | Age: 74
Setting detail: THERAPIES SERIES
Discharge: HOME | End: 2024-04-12
Payer: MEDICARE

## 2024-04-12 PROCEDURE — 93798 PHYS/QHP OP CAR RHAB W/ECG: CPT

## 2024-04-15 ENCOUNTER — HOSPITAL ENCOUNTER (OUTPATIENT)
Dept: CARDIAC REHAB | Age: 74
Setting detail: THERAPIES SERIES
Discharge: HOME | End: 2024-04-15
Payer: MEDICARE

## 2024-04-15 PROCEDURE — 93798 PHYS/QHP OP CAR RHAB W/ECG: CPT

## 2024-04-19 ENCOUNTER — HOSPITAL ENCOUNTER (OUTPATIENT)
Dept: CARDIAC REHAB | Age: 74
Setting detail: THERAPIES SERIES
Discharge: HOME | End: 2024-04-19
Payer: MEDICARE

## 2024-04-19 PROCEDURE — 93798 PHYS/QHP OP CAR RHAB W/ECG: CPT

## 2024-04-22 ENCOUNTER — HOSPITAL ENCOUNTER (OUTPATIENT)
Dept: CARDIAC REHAB | Age: 74
Setting detail: THERAPIES SERIES
Discharge: HOME | End: 2024-04-22
Payer: MEDICARE

## 2024-04-22 PROCEDURE — 93798 PHYS/QHP OP CAR RHAB W/ECG: CPT

## 2024-04-24 ENCOUNTER — HOSPITAL ENCOUNTER (OUTPATIENT)
Dept: CARDIAC REHAB | Age: 74
Setting detail: THERAPIES SERIES
Discharge: HOME | End: 2024-04-24
Payer: MEDICARE

## 2024-04-24 PROCEDURE — 93798 PHYS/QHP OP CAR RHAB W/ECG: CPT

## 2024-04-26 ENCOUNTER — HOSPITAL ENCOUNTER (OUTPATIENT)
Dept: CARDIAC REHAB | Age: 74
Setting detail: THERAPIES SERIES
Discharge: HOME | End: 2024-04-26
Payer: MEDICARE

## 2024-04-26 PROCEDURE — 93798 PHYS/QHP OP CAR RHAB W/ECG: CPT

## 2024-04-29 ENCOUNTER — HOSPITAL ENCOUNTER (OUTPATIENT)
Dept: CARDIAC REHAB | Age: 74
Setting detail: THERAPIES SERIES
Discharge: HOME | End: 2024-04-29
Payer: MEDICARE

## 2024-04-29 PROCEDURE — 93798 PHYS/QHP OP CAR RHAB W/ECG: CPT

## 2024-05-01 ENCOUNTER — HOSPITAL ENCOUNTER (OUTPATIENT)
Dept: CARDIAC REHAB | Age: 74
Setting detail: THERAPIES SERIES
Discharge: HOME | End: 2024-05-01
Payer: MEDICARE

## 2024-05-01 PROCEDURE — 93798 PHYS/QHP OP CAR RHAB W/ECG: CPT

## 2024-05-03 ENCOUNTER — HOSPITAL ENCOUNTER (OUTPATIENT)
Dept: CARDIAC REHAB | Age: 74
Setting detail: THERAPIES SERIES
Discharge: HOME | End: 2024-05-03
Payer: MEDICARE

## 2024-05-03 DIAGNOSIS — I25.10 CORONARY ARTERY DISEASE INVOLVING NATIVE CORONARY ARTERY OF NATIVE HEART WITHOUT ANGINA PECTORIS: ICD-10-CM

## 2024-05-03 DIAGNOSIS — I10 PRIMARY HYPERTENSION: ICD-10-CM

## 2024-05-03 PROCEDURE — 93798 PHYS/QHP OP CAR RHAB W/ECG: CPT

## 2024-05-03 RX ORDER — METOPROLOL SUCCINATE 25 MG/1
25 TABLET, EXTENDED RELEASE ORAL DAILY
Qty: 90 TABLET | Refills: 0 | Status: SHIPPED | OUTPATIENT
Start: 2024-05-03

## 2024-05-03 NOTE — TELEPHONE ENCOUNTER
2/21/2024 NPDE-Follow up with DENP in 3-4 months or sooner if needed   No upcoming appt  2/13/2024 bmp, cbc  2/10/2024 EKG    Please contact pt for follow up per NPDE

## 2024-05-06 ENCOUNTER — HOSPITAL ENCOUNTER (OUTPATIENT)
Dept: CARDIAC REHAB | Age: 74
Setting detail: THERAPIES SERIES
Discharge: HOME | End: 2024-05-06
Payer: MEDICARE

## 2024-05-06 PROCEDURE — 93798 PHYS/QHP OP CAR RHAB W/ECG: CPT

## 2024-05-08 ENCOUNTER — TELEPHONE (OUTPATIENT)
Dept: CARDIAC REHAB | Age: 74
End: 2024-05-08

## 2024-05-08 ENCOUNTER — HOSPITAL ENCOUNTER (OUTPATIENT)
Dept: CARDIAC REHAB | Age: 74
Setting detail: THERAPIES SERIES
Discharge: HOME | End: 2024-05-08
Payer: MEDICARE

## 2024-05-08 DIAGNOSIS — I10 PRIMARY HYPERTENSION: ICD-10-CM

## 2024-05-08 DIAGNOSIS — I25.10 CORONARY ARTERY DISEASE INVOLVING NATIVE CORONARY ARTERY OF NATIVE HEART WITHOUT ANGINA PECTORIS: ICD-10-CM

## 2024-05-08 PROCEDURE — 93798 PHYS/QHP OP CAR RHAB W/ECG: CPT

## 2024-05-08 NOTE — PROGRESS NOTES
PT assymtomatic with  runs of ectopic during exercise pvc, couplets and bigeminy.  See attached session notes.  Please advise.

## 2024-05-09 NOTE — TELEPHONE ENCOUNTER
Pt returned call. Message given. Pt wanted to know if she should ask her PCP anything specific about what is going on or if there any blood work that she should get    Pharmacy: [facility] [id number] - [facility] [address] [phone] - F [phone] [[id number]]

## 2024-05-09 NOTE — TELEPHONE ENCOUNTER
Attempted to reach patient to relay instructions. Medication pending please verify patient pharmacy for medication change. She may take 2 tablets metoprolol succinate daily until she picks up new prescription.

## 2024-05-10 ENCOUNTER — HOSPITAL ENCOUNTER (OUTPATIENT)
Dept: CARDIAC REHAB | Age: 74
Setting detail: THERAPIES SERIES
Discharge: HOME | End: 2024-05-10
Payer: MEDICARE

## 2024-05-10 PROCEDURE — 93798 PHYS/QHP OP CAR RHAB W/ECG: CPT

## 2024-05-10 RX ORDER — METOPROLOL SUCCINATE 50 MG/1
50 TABLET, EXTENDED RELEASE ORAL DAILY
Qty: 90 TABLET | Refills: 2 | Status: SHIPPED | OUTPATIENT
Start: 2024-05-10

## 2024-05-10 NOTE — TELEPHONE ENCOUNTER
Pt returned call. Janny's message given about medication increase. Pt Vu. Pt has appt with PCP on Monday 5/13/24 and wants to know what testing /labs should pt ask her for. Please advise.

## 2024-05-10 NOTE — TELEPHONE ENCOUNTER
Attempted to reach patient to notify patient was having premature beats that were asymptomatic during exertion that prompted medication increase to Toprol XL 50 mg daily.

## 2024-05-13 ENCOUNTER — HOSPITAL ENCOUNTER (OUTPATIENT)
Dept: CARDIAC REHAB | Age: 74
Setting detail: THERAPIES SERIES
Discharge: HOME | End: 2024-05-13
Payer: MEDICARE

## 2024-05-13 PROCEDURE — 93798 PHYS/QHP OP CAR RHAB W/ECG: CPT

## 2024-05-15 ENCOUNTER — HOSPITAL ENCOUNTER (OUTPATIENT)
Dept: CARDIAC REHAB | Age: 74
Setting detail: THERAPIES SERIES
Discharge: HOME | End: 2024-05-15
Payer: MEDICARE

## 2024-05-15 PROCEDURE — 93798 PHYS/QHP OP CAR RHAB W/ECG: CPT

## 2024-05-17 ENCOUNTER — HOSPITAL ENCOUNTER (OUTPATIENT)
Dept: CARDIAC REHAB | Age: 74
Setting detail: THERAPIES SERIES
Discharge: HOME | End: 2024-05-17
Payer: MEDICARE

## 2024-05-17 PROCEDURE — 93798 PHYS/QHP OP CAR RHAB W/ECG: CPT

## 2024-05-20 ENCOUNTER — HOSPITAL ENCOUNTER (OUTPATIENT)
Dept: CARDIAC REHAB | Age: 74
Setting detail: THERAPIES SERIES
Discharge: HOME | End: 2024-05-20
Payer: MEDICARE

## 2024-05-20 PROCEDURE — 93798 PHYS/QHP OP CAR RHAB W/ECG: CPT

## 2024-05-22 ENCOUNTER — HOSPITAL ENCOUNTER (OUTPATIENT)
Dept: CARDIAC REHAB | Age: 74
Setting detail: THERAPIES SERIES
Discharge: HOME | End: 2024-05-22
Payer: MEDICARE

## 2024-05-22 PROCEDURE — 93798 PHYS/QHP OP CAR RHAB W/ECG: CPT

## 2024-05-23 NOTE — PROGRESS NOTES
The second diagonal branch was jailed by the stent and was treated with   a 2.0-mm Compliant balloon.  3.  Mildly reduced left ventricular systolic function, ejection fraction 45%  with anteroapical and infroapical akinesis.  4.  Normal left ventricular end-diastolic pressure 11 mmHg.        Echo 2/12/24:  Summary Left ventricular systolic function is normal with a visually estimated ejection fraction of 55%. No obvious regional wall motion abnormalities noted. Normal left ventricular diastolic function. The right ventricle is normal in size with normal systolic function. Trace mitral and tricuspid regurgitation is present. Systolic pulmonary artery pressure (SPAP) is normal and estimated at 19+mmHg (right atrial pressure 3 mmHg    Echo 4/21/2023:  Summary Normal left ventricle systolic function with an estimated ejection fraction of 55%. No regional wall motion abnormalities are seen. Normal left ventricular diastolic filling pressure. Mild tricuspid regurgitation. Systolic pulmonary artery pressure (SPAP) is normal and estimated at 25 mmHg (right atrial pressure 3 mmHg).    Echo 10/15/2015:  Summary   Normal left ventricle size, wall thickness and systolic function with an   estimated ejection fraction of 55-60%. No regional wall motion abnormalities   are seen. There is reversal of E/A inflow velocities across the mitral valve   suggesting impaired left ventricular relaxation.    Assessment:    Coronary artery disease involving native coronary artery of native heart without angina pectoris  -NSTEMI in 2/2024: DK crush bifurcation stent of LAD and Dg on 2/12/24; residual moderate nonobstructive RCA and LCX disease  -PCI of LAD in 2015  -nothing to suggest angina  -continue DAPT, statin and BB  -risk factor modification  -finishing up cardiac rehab    2. Primary hypertension  -well controlled @ goal BP < 130/80  -continue medical management    3. Hyperlipidemia LDL goal <70  -continue statin  -LDL 41 on labs from

## 2024-05-24 ENCOUNTER — HOSPITAL ENCOUNTER (OUTPATIENT)
Dept: CARDIAC REHAB | Age: 74
Setting detail: THERAPIES SERIES
Discharge: HOME | End: 2024-05-24
Payer: MEDICARE

## 2024-05-24 PROCEDURE — 93798 PHYS/QHP OP CAR RHAB W/ECG: CPT

## 2024-05-28 ENCOUNTER — OFFICE VISIT (OUTPATIENT)
Dept: CARDIOLOGY CLINIC | Age: 74
End: 2024-05-28
Payer: MEDICARE

## 2024-05-28 VITALS
SYSTOLIC BLOOD PRESSURE: 108 MMHG | DIASTOLIC BLOOD PRESSURE: 64 MMHG | HEART RATE: 68 BPM | OXYGEN SATURATION: 98 % | WEIGHT: 140.5 LBS | BODY MASS INDEX: 23.99 KG/M2 | HEIGHT: 64 IN

## 2024-05-28 DIAGNOSIS — D50.9 IRON DEFICIENCY ANEMIA, UNSPECIFIED IRON DEFICIENCY ANEMIA TYPE: ICD-10-CM

## 2024-05-28 DIAGNOSIS — I49.3 PVC'S (PREMATURE VENTRICULAR CONTRACTIONS): ICD-10-CM

## 2024-05-28 DIAGNOSIS — I25.10 CORONARY ARTERY DISEASE INVOLVING NATIVE CORONARY ARTERY OF NATIVE HEART WITHOUT ANGINA PECTORIS: Primary | ICD-10-CM

## 2024-05-28 DIAGNOSIS — E78.5 HYPERLIPIDEMIA LDL GOAL <70: ICD-10-CM

## 2024-05-28 DIAGNOSIS — C64.1 RENAL CELL CARCINOMA OF RIGHT KIDNEY (HCC): ICD-10-CM

## 2024-05-28 DIAGNOSIS — I10 PRIMARY HYPERTENSION: ICD-10-CM

## 2024-05-28 PROBLEM — I25.5 ISCHEMIC CARDIOMYOPATHY: Status: RESOLVED | Noted: 2020-10-26 | Resolved: 2024-05-28

## 2024-05-28 PROBLEM — T14.8XXA HEMATOMA: Status: RESOLVED | Noted: 2024-02-13 | Resolved: 2024-05-28

## 2024-05-28 PROCEDURE — G8420 CALC BMI NORM PARAMETERS: HCPCS | Performed by: NURSE PRACTITIONER

## 2024-05-28 PROCEDURE — G8400 PT W/DXA NO RESULTS DOC: HCPCS | Performed by: NURSE PRACTITIONER

## 2024-05-28 PROCEDURE — 99214 OFFICE O/P EST MOD 30 MIN: CPT | Performed by: NURSE PRACTITIONER

## 2024-05-28 PROCEDURE — 1036F TOBACCO NON-USER: CPT | Performed by: NURSE PRACTITIONER

## 2024-05-28 PROCEDURE — 3074F SYST BP LT 130 MM HG: CPT | Performed by: NURSE PRACTITIONER

## 2024-05-28 PROCEDURE — 3078F DIAST BP <80 MM HG: CPT | Performed by: NURSE PRACTITIONER

## 2024-05-28 PROCEDURE — G8427 DOCREV CUR MEDS BY ELIG CLIN: HCPCS | Performed by: NURSE PRACTITIONER

## 2024-05-28 PROCEDURE — 1090F PRES/ABSN URINE INCON ASSESS: CPT | Performed by: NURSE PRACTITIONER

## 2024-05-28 PROCEDURE — 1123F ACP DISCUSS/DSCN MKR DOCD: CPT | Performed by: NURSE PRACTITIONER

## 2024-05-28 PROCEDURE — 3017F COLORECTAL CA SCREEN DOC REV: CPT | Performed by: NURSE PRACTITIONER

## 2024-05-28 RX ORDER — NITROGLYCERIN 0.4 MG/1
0.4 TABLET SUBLINGUAL EVERY 5 MIN PRN
Qty: 25 TABLET | Refills: 3 | Status: SHIPPED | OUTPATIENT
Start: 2024-05-28

## 2024-05-28 RX ORDER — CLOPIDOGREL BISULFATE 75 MG/1
TABLET ORAL
Qty: 90 TABLET | Refills: 3 | Status: SHIPPED | OUTPATIENT
Start: 2024-05-28

## 2024-05-29 ENCOUNTER — HOSPITAL ENCOUNTER (OUTPATIENT)
Age: 74
Discharge: HOME | End: 2024-05-29
Payer: MEDICARE

## 2024-05-29 ENCOUNTER — HOSPITAL ENCOUNTER (OUTPATIENT)
Dept: CARDIAC REHAB | Age: 74
Setting detail: THERAPIES SERIES
Discharge: HOME | End: 2024-05-29
Payer: MEDICARE

## 2024-05-29 DIAGNOSIS — I10 PRIMARY HYPERTENSION: ICD-10-CM

## 2024-05-29 DIAGNOSIS — I49.3 PVC'S (PREMATURE VENTRICULAR CONTRACTIONS): ICD-10-CM

## 2024-05-29 DIAGNOSIS — D50.9 IRON DEFICIENCY ANEMIA, UNSPECIFIED IRON DEFICIENCY ANEMIA TYPE: ICD-10-CM

## 2024-05-29 DIAGNOSIS — I25.10 CORONARY ARTERY DISEASE INVOLVING NATIVE CORONARY ARTERY OF NATIVE HEART WITHOUT ANGINA PECTORIS: ICD-10-CM

## 2024-05-29 LAB
ALBUMIN SERPL-MCNC: 4.5 G/DL (ref 3.4–5)
ALBUMIN/GLOB SERPL: 1.6 {RATIO} (ref 1.1–2.2)
ALP SERPL-CCNC: 136 U/L (ref 40–129)
ALT SERPL-CCNC: 11 U/L (ref 10–40)
ANION GAP SERPL CALCULATED.3IONS-SCNC: 11 MMOL/L (ref 3–16)
AST SERPL-CCNC: 32 U/L (ref 15–37)
BILIRUB SERPL-MCNC: 0.5 MG/DL (ref 0–1)
BUN SERPL-MCNC: 30 MG/DL (ref 7–20)
CALCIUM SERPL-MCNC: 10.3 MG/DL (ref 8.3–10.6)
CHLORIDE SERPL-SCNC: 101 MMOL/L (ref 99–110)
CO2 SERPL-SCNC: 29 MMOL/L (ref 21–32)
CREAT SERPL-MCNC: 1.1 MG/DL (ref 0.6–1.2)
DEPRECATED RDW RBC AUTO: 15.1 % (ref 12.4–15.4)
GFR SERPLBLD CREATININE-BSD FMLA CKD-EPI: 54 ML/MIN/{1.73_M2}
GLUCOSE SERPL-MCNC: 67 MG/DL (ref 70–99)
HCT VFR BLD AUTO: 39.8 % (ref 36–48)
HGB BLD-MCNC: 13.5 G/DL (ref 12–16)
MAGNESIUM SERPL-MCNC: 2.6 MG/DL (ref 1.8–2.4)
MCH RBC QN AUTO: 30.2 PG (ref 26–34)
MCHC RBC AUTO-ENTMCNC: 33.9 G/DL (ref 31–36)
MCV RBC AUTO: 89.1 FL (ref 80–100)
PLATELET # BLD AUTO: 154 K/UL (ref 135–450)
PMV BLD AUTO: 10.6 FL (ref 5–10.5)
POTASSIUM SERPL-SCNC: 5.1 MMOL/L (ref 3.5–5.1)
PROT SERPL-MCNC: 7.4 G/DL (ref 6.4–8.2)
RBC # BLD AUTO: 4.46 M/UL (ref 4–5.2)
SODIUM SERPL-SCNC: 141 MMOL/L (ref 136–145)
WBC # BLD AUTO: 3.2 K/UL (ref 4–11)

## 2024-05-29 PROCEDURE — 36415 COLL VENOUS BLD VENIPUNCTURE: CPT

## 2024-05-29 PROCEDURE — 85027 COMPLETE CBC AUTOMATED: CPT

## 2024-05-29 PROCEDURE — 93798 PHYS/QHP OP CAR RHAB W/ECG: CPT

## 2024-05-29 PROCEDURE — 80053 COMPREHEN METABOLIC PANEL: CPT

## 2024-05-29 PROCEDURE — 83735 ASSAY OF MAGNESIUM: CPT

## 2024-05-31 ENCOUNTER — HOSPITAL ENCOUNTER (OUTPATIENT)
Dept: CARDIAC REHAB | Age: 74
Setting detail: THERAPIES SERIES
Discharge: HOME | End: 2024-05-31
Payer: MEDICARE

## 2024-05-31 ENCOUNTER — TELEPHONE (OUTPATIENT)
Dept: CARDIOLOGY CLINIC | Age: 74
End: 2024-05-31

## 2024-05-31 DIAGNOSIS — Z79.899 MEDICATION MANAGEMENT: Primary | ICD-10-CM

## 2024-05-31 PROCEDURE — 93798 PHYS/QHP OP CAR RHAB W/ECG: CPT

## 2024-05-31 NOTE — TELEPHONE ENCOUNTER
Called and spoke with patient, relayed DE NP results and instructions. Patient VU. Orders placed. She will have repeat labs completed as ordered.

## 2024-05-31 NOTE — TELEPHONE ENCOUNTER
----- Message from Diane M Enzweiler, APRN - CNP sent at 5/30/2024 11:07 AM EDT -----  Review of labs:  1. BMP: K+ 5.1 slightly elevated; reinforce no potassium supplements, salt substitute or high K+ foods. If taking multivitamin recommend discontinuing  2. Magnesium: elevated; reinforce no magnesium supplements or multivitamin  3.CBC: Hgb improved. White count slightly low (has been noted in the past).  4. Recheck BMP in 3-4 weeks.  5. Continue same cardiac meds.    Please call.

## 2024-06-26 ENCOUNTER — HOSPITAL ENCOUNTER (OUTPATIENT)
Age: 74
Discharge: HOME | End: 2024-06-26
Payer: MEDICARE

## 2024-06-26 DIAGNOSIS — Z79.899 MEDICATION MANAGEMENT: ICD-10-CM

## 2024-06-26 LAB
ANION GAP SERPL CALCULATED.3IONS-SCNC: 11 MMOL/L (ref 3–16)
BUN SERPL-MCNC: 27 MG/DL (ref 7–20)
CALCIUM SERPL-MCNC: 9.9 MG/DL (ref 8.3–10.6)
CHLORIDE SERPL-SCNC: 103 MMOL/L (ref 99–110)
CO2 SERPL-SCNC: 28 MMOL/L (ref 21–32)
CREAT SERPL-MCNC: 1.2 MG/DL (ref 0.6–1.2)
GFR SERPLBLD CREATININE-BSD FMLA CKD-EPI: 48 ML/MIN/{1.73_M2}
GLUCOSE SERPL-MCNC: 76 MG/DL (ref 70–99)
MAGNESIUM SERPL-MCNC: 2.5 MG/DL (ref 1.8–2.4)
POTASSIUM SERPL-SCNC: 5.6 MMOL/L (ref 3.5–5.1)
SODIUM SERPL-SCNC: 142 MMOL/L (ref 136–145)

## 2024-06-26 PROCEDURE — 83735 ASSAY OF MAGNESIUM: CPT

## 2024-06-26 PROCEDURE — 36415 COLL VENOUS BLD VENIPUNCTURE: CPT

## 2024-06-26 PROCEDURE — 80048 BASIC METABOLIC PNL TOTAL CA: CPT

## 2024-06-27 ENCOUNTER — TELEPHONE (OUTPATIENT)
Dept: CARDIOLOGY CLINIC | Age: 74
End: 2024-06-27

## 2024-06-27 DIAGNOSIS — Z79.899 MEDICATION MANAGEMENT: Primary | ICD-10-CM

## 2024-06-27 NOTE — TELEPHONE ENCOUNTER
----- Message from Diane M Enzweiler, APRN - CNP sent at 6/27/2024  8:18 AM EDT -----  Labs reviewed:  Potassium is elevated: stop lisinopril; reinforce no salt substitute, over the counter potassium supplement, decrease high K+ foods. No multivitamin.    Magnesium elevated: no multivitamin or magnesium supplements    Cr has increased slightly but still okay.    Recheck Potassium level in 1 week.    Please call. Thanks!

## 2024-07-05 ENCOUNTER — HOSPITAL ENCOUNTER (OUTPATIENT)
Age: 74
Discharge: HOME | End: 2024-07-05
Payer: MEDICARE

## 2024-07-05 DIAGNOSIS — Z79.899 MEDICATION MANAGEMENT: ICD-10-CM

## 2024-07-05 LAB — POTASSIUM SERPL-SCNC: 4.1 MMOL/L (ref 3.5–5.1)

## 2024-07-05 PROCEDURE — 84132 ASSAY OF SERUM POTASSIUM: CPT

## 2024-07-05 PROCEDURE — 36415 COLL VENOUS BLD VENIPUNCTURE: CPT

## 2024-07-09 ENCOUNTER — TELEPHONE (OUTPATIENT)
Dept: CARDIOLOGY CLINIC | Age: 74
End: 2024-07-09

## 2024-07-09 NOTE — TELEPHONE ENCOUNTER
Her kidney function appears to be stable. If she has seen nephrology in the past, she can follow up with their office to see if she needs to be seen.

## 2024-07-09 NOTE — TELEPHONE ENCOUNTER
Pt stated that when she had lab work completed at the end of May and it tested for her kidney function. Pt stated that some of those numbers came back abnormal and she wanted to know if she npde thinks she needs to follow up with nephrology. Labs are in chart. Please advise.

## 2024-07-09 NOTE — TELEPHONE ENCOUNTER
----- Message from Diane M Enzweiler, APRN - CNP sent at 7/8/2024  8:15 AM EDT -----  K+ has normalized off Lisinopril. Continue to stay off lisinopril and monitor BP. Call if BP sustains > 130/80. Please call.

## 2024-09-03 ENCOUNTER — TELEPHONE (OUTPATIENT)
Dept: CARDIOLOGY CLINIC | Age: 74
End: 2024-09-03

## 2024-09-03 NOTE — TELEPHONE ENCOUNTER
Low to intermediate risk  for dental extractions and implants. OK to hold plavix x 5days, however would recommend continuing low dose Aspirin without interruption.    Please send clearance.Thanks!

## 2024-09-03 NOTE — TELEPHONE ENCOUNTER
CARDIAC CLEARANCE REQUEST    What type of procedure are you having:  Dental extractions and implants  Are you taking any blood thinners:  Plavix 75mg and Asprin 81mg  Type on anesthesia:  IV sedation  When is your procedure scheduled for:  9/16/24  What physician is performing your procedure:  Dr. Jhony Vazquez  Phone Number:  [phone]  Fax number to send the letter:  [phone]

## 2024-09-04 NOTE — TELEPHONE ENCOUNTER
Letter created and faxed, fax and conformation scanned into media   LM per HIPAA relaying clearance

## 2024-12-06 ENCOUNTER — OFFICE VISIT (OUTPATIENT)
Dept: CARDIOLOGY CLINIC | Age: 74
End: 2024-12-06
Payer: MEDICARE

## 2024-12-06 VITALS
HEART RATE: 71 BPM | SYSTOLIC BLOOD PRESSURE: 146 MMHG | BODY MASS INDEX: 21.68 KG/M2 | OXYGEN SATURATION: 97 % | HEIGHT: 64 IN | WEIGHT: 127 LBS | DIASTOLIC BLOOD PRESSURE: 84 MMHG

## 2024-12-06 DIAGNOSIS — Z95.5 HISTORY OF CORONARY ARTERY STENT PLACEMENT: ICD-10-CM

## 2024-12-06 DIAGNOSIS — I25.10 CORONARY ARTERY DISEASE INVOLVING NATIVE CORONARY ARTERY OF NATIVE HEART WITHOUT ANGINA PECTORIS: ICD-10-CM

## 2024-12-06 DIAGNOSIS — I10 PRIMARY HYPERTENSION: ICD-10-CM

## 2024-12-06 DIAGNOSIS — E78.5 HYPERLIPIDEMIA LDL GOAL <70: Primary | ICD-10-CM

## 2024-12-06 PROCEDURE — 3077F SYST BP >= 140 MM HG: CPT | Performed by: INTERNAL MEDICINE

## 2024-12-06 PROCEDURE — 3017F COLORECTAL CA SCREEN DOC REV: CPT | Performed by: INTERNAL MEDICINE

## 2024-12-06 PROCEDURE — 1159F MED LIST DOCD IN RCRD: CPT | Performed by: INTERNAL MEDICINE

## 2024-12-06 PROCEDURE — G8400 PT W/DXA NO RESULTS DOC: HCPCS | Performed by: INTERNAL MEDICINE

## 2024-12-06 PROCEDURE — 3079F DIAST BP 80-89 MM HG: CPT | Performed by: INTERNAL MEDICINE

## 2024-12-06 PROCEDURE — 99214 OFFICE O/P EST MOD 30 MIN: CPT | Performed by: INTERNAL MEDICINE

## 2024-12-06 PROCEDURE — G8484 FLU IMMUNIZE NO ADMIN: HCPCS | Performed by: INTERNAL MEDICINE

## 2024-12-06 PROCEDURE — 1123F ACP DISCUSS/DSCN MKR DOCD: CPT | Performed by: INTERNAL MEDICINE

## 2024-12-06 PROCEDURE — G8420 CALC BMI NORM PARAMETERS: HCPCS | Performed by: INTERNAL MEDICINE

## 2024-12-06 PROCEDURE — G8427 DOCREV CUR MEDS BY ELIG CLIN: HCPCS | Performed by: INTERNAL MEDICINE

## 2024-12-06 PROCEDURE — 1036F TOBACCO NON-USER: CPT | Performed by: INTERNAL MEDICINE

## 2024-12-06 PROCEDURE — 1090F PRES/ABSN URINE INCON ASSESS: CPT | Performed by: INTERNAL MEDICINE

## 2024-12-06 RX ORDER — AMLODIPINE BESYLATE 2.5 MG/1
2.5 TABLET ORAL DAILY
Qty: 30 TABLET | Refills: 3 | Status: SHIPPED | OUTPATIENT
Start: 2024-12-06

## 2024-12-06 RX ORDER — METOPROLOL SUCCINATE 50 MG/1
50 TABLET, EXTENDED RELEASE ORAL DAILY
Qty: 90 TABLET | Refills: 3 | Status: SHIPPED | OUTPATIENT
Start: 2024-12-06

## 2024-12-06 RX ORDER — ATORVASTATIN CALCIUM 40 MG/1
TABLET, FILM COATED ORAL
Qty: 90 TABLET | Refills: 3 | Status: SHIPPED | OUTPATIENT
Start: 2024-12-06

## 2024-12-06 NOTE — PATIENT INSTRUCTIONS
Plan:  Start amlodipine (Norvasc) 2.5 mg daily ~ hypertension  Recommend monitoring blood pressure at home 4-5 times a week. Keep a log of blood pressure and heart rate. Goal 130/80 or less. Call office for further intervention if consistently elevated above goal.    Patient given detailed instructions on addressing diet, regular exercise, weight control, smoking abstention, medication compliance, and stress minimization. The patient was provided written and verbal instructions regarding risk factor modification.    Follow up with Diane Enzweiler, CNP in 6 months

## 2024-12-06 NOTE — PROGRESS NOTES
[facility]   Cardiovascular Evaluation    PATIENT: Bren Vazquez  DATE: 2024  MRN: [id number]  CSN: [id number]  : 1950    Primary Care Doctor: Arcenio Desir MD    Reason for evaluation/Chief complaint:   6 Month Follow-Up, Coronary Artery Disease, Hyperlipidemia, and Hypertension        Subjective:    History of present illness on initial date of evaluation:   Bren Vazquez is a 74 y.o. patient who presents for follow up. She has a past medical history including coronary artery disease, hypertension, and ischemic cardiomyopathy. She presented to the emergency department 2015 with chest pain. She underwent stress test 2015 mixed ischemia and scar shown in apex and adjacent anteroseptal segment. Therefore she underwent left heart catheterization 2015 with PCI to LAD with HERNANDO and PCI to Diag 2 w/ballon PTCA. Her echocardiogram 10/15/2015 showed EF 55-60% and reversal of E/A inflow velocities across the mitral valve suggesting impaired left ventricular relaxation.She had a fall in 2021 that resulted in a right femur fracture that required surgical intervention.  At last office visit an Echo was ordered 23 EF 55%, mild TR. She was hospitalized at [facility] from 2/10/24-24 after presenting with chest pain and palpitations. Seen and evaluated in ED and noted to have elevated troponins. She was taken for cardiac cath on 24 with resulting stenting to LAD and Dg with DK crush technique. Post procedure she developed issues with hematoma at site of R radial stick. RUE doppler negative for pseudoaneurysm. She was seen in follow up in 2024 and Brilinta was changed to plavix d/t cost. She completed cardiac rehab in May 2024.  Today she states she is not taking lisinopril due to hyperkalemia. She has noted her diastolic readings are elevated.     Patient Active Problem List   Diagnosis    Localized osteoarthrosis, lower leg    Chondromalacia of

## 2025-01-06 RX ORDER — VALSARTAN 80 MG/1
80 TABLET ORAL DAILY
Qty: 90 TABLET | Refills: 0 | Status: SHIPPED | OUTPATIENT
Start: 2025-01-06

## 2025-01-06 NOTE — TELEPHONE ENCOUNTER
Pt sts Amlodipine is not helping with B/P and it is causing swelling in ankles. VSP said if med did not help he would change it.   Pharmacy is [facility] [id number] - [place] [id number] - [place] [phone] - F [phone]

## 2025-01-30 ENCOUNTER — APPOINTMENT (OUTPATIENT)
Dept: GENERAL RADIOLOGY | Age: 75
End: 2025-01-30
Payer: MEDICARE

## 2025-01-30 ENCOUNTER — HOSPITAL ENCOUNTER (EMERGENCY)
Age: 75
Discharge: HOME | End: 2025-01-30
Payer: MEDICARE

## 2025-01-30 VITALS
HEART RATE: 62 BPM | OXYGEN SATURATION: 100 % | SYSTOLIC BLOOD PRESSURE: 147 MMHG | DIASTOLIC BLOOD PRESSURE: 63 MMHG | RESPIRATION RATE: 18 BRPM | TEMPERATURE: 97.2 F
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
## 2025-01-30 DIAGNOSIS — S60.222A CONTUSION OF LEFT HAND, INITIAL ENCOUNTER: ICD-10-CM

## 2025-01-30 PROCEDURE — 73130 X-RAY EXAM OF HAND: CPT

## 2025-01-30 PROCEDURE — 99283 EMERGENCY DEPT VISIT LOW MDM: CPT

## 2025-01-30 PROCEDURE — 73110 X-RAY EXAM OF WRIST: CPT

## 2025-01-30 RX ORDER — NAPROXEN 250 MG/1
250 TABLET ORAL 2 TIMES DAILY WITH MEALS
Qty: 20 TABLET | Refills: 0 | Status: SHIPPED | OUTPATIENT
Start: 2025-01-30 | End: 2025-02-09

## 2025-01-30 ASSESSMENT — ENCOUNTER SYMPTOMS
SORE THROAT: 0
FACIAL SWELLING: 0
COLOR CHANGE: 0
ABDOMINAL PAIN: 0
SHORTNESS OF BREATH: 0
RHINORRHEA: 0

## 2025-01-30 NOTE — ED PROVIDER NOTES
range of motion.      Comments: Decreased range of motion to the left wrist due to pain elicited with movement.  Ecchymosis and swelling noted to the dorsal aspect of the left hand.  No tenderness at the snuffbox.  Sensation and pulse intact to left hand.  Patient is right-hand dominant.   Skin:     General: Skin is warm and dry.   Neurological:      Mental Status: She is alert and oriented to person, place, and time.         DIAGNOSTIC RESULTS   LABS:    Labs Reviewed - No data to display      All other labs were within normal range or not returned as of this dictation.      EKG: All EKG's are interpreted by the Emergency Department Physician who either signs or Co-signs this chart in the absence of a cardiologist.  Please see their note for interpretation of EKG.      RADIOLOGY:   Non plain film images ans such as CT, ultrasound, and MRI are read by the radiologist. Plain radiographic images are visualized and preliminarily interpreted by the ED Provider with the below findings:    Left hand x-ray interpreted by radiologist for  Impression:    No acute finding of the left hand or wrist.                Left wrist x-ray interpreted by radiologist for  Impression:    No acute finding of the left hand or wrist.                    Interpretation per the Radiologist below, if available at the time of this note:    XR HAND LEFT (MIN 3 VIEWS)   Final Result   No acute finding of the left hand or wrist.         XR WRIST LEFT (MIN 3 VIEWS)   Final Result   No acute finding of the left hand or wrist.           No results found.  No results found.  No results found.      PROCEDURES   Unless otherwise noted below, none     Procedures     CRITICAL CARE TIME   Unless otherwise noted below, none        EMERGENCYDEPARTMENT COURSE/MDM:     Vitals:    Vitals:    01/30/25 0959 01/30/25 1001 01/30/25 1049   BP: (!) 150/68  (!) 147/63   Pulse: 59  62   Resp:  16 18   Temp: 97.2 °F (36.2 °C)     SpO2: 100%  100%         Patient was seen

## 2025-01-30 NOTE — ED TRIAGE NOTES
Patient presents to the ED from home with c/o Fall on Monday, left hand pain. Patient denies LOC. Wants to make sure nothing is broken.

## 2025-04-04 DIAGNOSIS — I10 PRIMARY HYPERTENSION: Primary | ICD-10-CM

## 2025-04-04 DIAGNOSIS — I25.10 CORONARY ARTERY DISEASE INVOLVING NATIVE CORONARY ARTERY OF NATIVE HEART WITHOUT ANGINA PECTORIS: ICD-10-CM

## 2025-04-07 ENCOUNTER — TELEPHONE (OUTPATIENT)
Dept: CARDIOLOGY CLINIC | Age: 75
End: 2025-04-07

## 2025-04-07 RX ORDER — VALSARTAN 160 MG/1
160 TABLET ORAL DAILY
Qty: 90 TABLET | Refills: 1 | Status: SHIPPED | OUTPATIENT
Start: 2025-04-07

## 2025-04-07 NOTE — TELEPHONE ENCOUNTER
Please see telephone encounter with instructions per VSP for dose change for Valsartan from 80 mg to 160 mg daily.

## 2025-04-07 NOTE — TELEPHONE ENCOUNTER
Pt called and stated that VSP wanted her to contact office to advise is Valsartan was helping with lowering BP.  Pt stated that systolic number staying in 140-145's.  Please send new medication to [facility] Pt does not want to use [facility] on [address]).  Once medication sent, please contact pt to advise.

## 2025-04-07 NOTE — TELEPHONE ENCOUNTER
Attempted to reach patient. No answer. Left VM to call office to discuss, medication sent under refill request.

## 2025-04-07 NOTE — TELEPHONE ENCOUNTER
VSP last office visit 12/06/24. Patient started on norvasc 2.5 mg daily at visit. Norvasc discontinued r/t swelling. Patient was then started on Valsartan 80 mg daily on 01/06/25. Please advise, patient has F/u with DE NP 06/09/25 scheduled.

## 2025-05-26 DIAGNOSIS — I25.10 CORONARY ARTERY DISEASE INVOLVING NATIVE CORONARY ARTERY OF NATIVE HEART WITHOUT ANGINA PECTORIS: ICD-10-CM

## 2025-05-27 RX ORDER — CLOPIDOGREL BISULFATE 75 MG/1
75 TABLET ORAL DAILY
Qty: 90 TABLET | Refills: 0 | Status: SHIPPED | OUTPATIENT
Start: 2025-05-27

## 2025-05-27 NOTE — TELEPHONE ENCOUNTER
VSP RX pending     Last Office Visit: 12/6/2024 Provider: VSP  **Is provider OOT? No    Next Office Visit: 6/9/2025 Provider: NPDE      Lab orders needed? no   Encounter provider correct? Yes If not, change provider  Script changes since last refill? no    LAST LABS:   CMP:  Lab Results   Component Value Date     06/26/2024    K 4.1 07/05/2024     06/26/2024    CO2 28 06/26/2024    BUN 27 (H) 06/26/2024    CREATININE 1.2 06/26/2024    GLUCOSE 76 06/26/2024    CALCIUM 9.9 06/26/2024    BILITOT 0.5 05/29/2024    ALKPHOS 136 (H) 05/29/2024    AST 32 05/29/2024    ALT 11 05/29/2024    LABGLOM 48 (A) 06/26/2024    GFRAA >60 05/30/2021    AGRATIO 1.6 05/29/2024    GLOB 2.9 08/30/2019

## 2025-06-05 NOTE — PROGRESS NOTES
Normal left ventricular end-diastolic pressure 11 mmHg.        Echo 2/12/24:  Summary Left ventricular systolic function is normal with a visually estimated ejection fraction of 55%. No obvious regional wall motion abnormalities noted. Normal left ventricular diastolic function. The right ventricle is normal in size with normal systolic function. Trace mitral and tricuspid regurgitation is present. Systolic pulmonary artery pressure (SPAP) is normal and estimated at 19+mmHg (right atrial pressure 3 mmHg     Echo 4/21/2023:  Summary Normal left ventricle systolic function with an estimated ejection fraction of 55%. No regional wall motion abnormalities are seen. Normal left ventricular diastolic filling pressure. Mild tricuspid regurgitation. Systolic pulmonary artery pressure (SPAP) is normal and estimated at 25 mmHg (right atrial pressure 3 mmHg).     Echo 10/15/2015:  Summary   Normal left ventricle size, wall thickness and systolic function with an   estimated ejection fraction of 55-60%. No regional wall motion abnormalities   are seen. There is reversal of E/A inflow velocities across the mitral valve   suggesting impaired left ventricular relaxation.    Assessment:    1. Coronary artery disease involving native coronary artery of native heart without angina pectoris  -H/O non-ST elevation myocardial infarction (NSTEMI)/S/P drug eluting coronary stent placement  -NSTEMI in 2/2024: NEO crush bifurcation stent of LAD and Dg on 2/12/24; residual moderate nonobstructive RCA and LCX disease  -PCI of LAD in 2015  -nothing to suggest angina  -continue DAPT, statin and BB  -risk factor modification  -reinforced regular exercise    2. Primary hypertension  -controlled and @ goal BP < 130/80  -continue medical management    3. Hyperlipidemia LDL goal <70  Last LDL 41 in Feb 2024; will ask for repeat lipids  -continue high intensity statin    4. Renal cell carcinoma of right kidney (HCC)  -S/P R nephrectomy  -follows with

## 2025-06-09 ENCOUNTER — OFFICE VISIT (OUTPATIENT)
Dept: CARDIOLOGY CLINIC | Age: 75
End: 2025-06-09
Payer: MEDICARE

## 2025-06-09 VITALS
SYSTOLIC BLOOD PRESSURE: 126 MMHG | HEIGHT: 64 IN | HEART RATE: 64 BPM | WEIGHT: 128.5 LBS | DIASTOLIC BLOOD PRESSURE: 70 MMHG | BODY MASS INDEX: 21.94 KG/M2 | OXYGEN SATURATION: 97 %

## 2025-06-09 DIAGNOSIS — I25.2 H/O NON-ST ELEVATION MYOCARDIAL INFARCTION (NSTEMI): ICD-10-CM

## 2025-06-09 DIAGNOSIS — E78.5 HYPERLIPIDEMIA LDL GOAL <70: ICD-10-CM

## 2025-06-09 DIAGNOSIS — Z95.5 S/P DRUG ELUTING CORONARY STENT PLACEMENT: ICD-10-CM

## 2025-06-09 DIAGNOSIS — I25.10 CORONARY ARTERY DISEASE INVOLVING NATIVE CORONARY ARTERY OF NATIVE HEART WITHOUT ANGINA PECTORIS: ICD-10-CM

## 2025-06-09 DIAGNOSIS — I10 PRIMARY HYPERTENSION: ICD-10-CM

## 2025-06-09 DIAGNOSIS — I49.3 PVC'S (PREMATURE VENTRICULAR CONTRACTIONS): ICD-10-CM

## 2025-06-09 DIAGNOSIS — I25.2 H/O NON-ST ELEVATION MYOCARDIAL INFARCTION (NSTEMI): Primary | ICD-10-CM

## 2025-06-09 DIAGNOSIS — C64.1 RENAL CELL CARCINOMA OF RIGHT KIDNEY (HCC): ICD-10-CM

## 2025-06-09 PROCEDURE — 1036F TOBACCO NON-USER: CPT | Performed by: NURSE PRACTITIONER

## 2025-06-09 PROCEDURE — 3017F COLORECTAL CA SCREEN DOC REV: CPT | Performed by: NURSE PRACTITIONER

## 2025-06-09 PROCEDURE — 1090F PRES/ABSN URINE INCON ASSESS: CPT | Performed by: NURSE PRACTITIONER

## 2025-06-09 PROCEDURE — 3074F SYST BP LT 130 MM HG: CPT | Performed by: NURSE PRACTITIONER

## 2025-06-09 PROCEDURE — G2211 COMPLEX E/M VISIT ADD ON: HCPCS | Performed by: NURSE PRACTITIONER

## 2025-06-09 PROCEDURE — 99214 OFFICE O/P EST MOD 30 MIN: CPT | Performed by: NURSE PRACTITIONER

## 2025-06-09 PROCEDURE — 1159F MED LIST DOCD IN RCRD: CPT | Performed by: NURSE PRACTITIONER

## 2025-06-09 PROCEDURE — G8420 CALC BMI NORM PARAMETERS: HCPCS | Performed by: NURSE PRACTITIONER

## 2025-06-09 PROCEDURE — G8400 PT W/DXA NO RESULTS DOC: HCPCS | Performed by: NURSE PRACTITIONER

## 2025-06-09 PROCEDURE — 1123F ACP DISCUSS/DSCN MKR DOCD: CPT | Performed by: NURSE PRACTITIONER

## 2025-06-09 PROCEDURE — 3078F DIAST BP <80 MM HG: CPT | Performed by: NURSE PRACTITIONER

## 2025-06-09 PROCEDURE — 1160F RVW MEDS BY RX/DR IN RCRD: CPT | Performed by: NURSE PRACTITIONER

## 2025-06-09 PROCEDURE — G8427 DOCREV CUR MEDS BY ELIG CLIN: HCPCS | Performed by: NURSE PRACTITIONER

## 2025-06-09 RX ORDER — CALCIUM CARBONATE 500(1250)
500 TABLET ORAL DAILY
COMMUNITY

## 2025-06-09 RX ORDER — METOPROLOL SUCCINATE 50 MG/1
50 TABLET, EXTENDED RELEASE ORAL DAILY
Qty: 90 TABLET | Refills: 3 | Status: SHIPPED | OUTPATIENT
Start: 2025-06-09

## 2025-06-09 RX ORDER — CLOPIDOGREL BISULFATE 75 MG/1
75 TABLET ORAL DAILY
Qty: 90 TABLET | Refills: 3 | Status: SHIPPED | OUTPATIENT
Start: 2025-06-09

## 2025-06-10 LAB
CHOLEST SERPL-MCNC: 153 MG/DL (ref 0–199)
HDLC SERPL-MCNC: 57 MG/DL (ref 40–60)
LDLC SERPL CALC-MCNC: 70 MG/DL
TRIGL SERPL-MCNC: 131 MG/DL (ref 0–150)
VLDLC SERPL CALC-MCNC: 26 MG/DL

## 2025-06-11 ENCOUNTER — RESULTS FOLLOW-UP (OUTPATIENT)
Dept: CARDIOLOGY CLINIC | Age: 75
End: 2025-06-11

## (undated) DEVICE — GUIDE PIN 3.2MM X 343MM: Brand: TRIGEN

## (undated) DEVICE — DRESSING FOAM W6.3XL7.9IN TAN SACR SELF ADH MEPILEX BORD

## (undated) DEVICE — TOTAL TRAY, DB, 100% SILI FOLEY, 16FR 10: Brand: MEDLINE

## (undated) DEVICE — 3.0MM X 1000MM BALL TIP GUIDE ROD: Brand: TRIGEN

## (undated) DEVICE — INTERTAN LAG SCREW DRILL: Brand: TRIGEN

## (undated) DEVICE — ADHESIVE SKIN CLSR 0.7ML TOP DERMBND ADV

## (undated) DEVICE — PACK PROCEDURE SURG HIP PIN TROCHANTERIC FEM NAIL CDS

## (undated) DEVICE — DRAPE 33X23IN INCISE ANTIMICROB IOBAN 2

## (undated) DEVICE — GLOVE ORANGE PI 7 1/2   MSG9075

## (undated) DEVICE — DRESSING FOAM W4XL4IN SIL FACE BORD ADH PD SUP ABSRB COR

## (undated) DEVICE — GLOVE SURG SZ 8 L11.77IN FNGR THK9.8MIL STRW LTX POLYMER

## (undated) DEVICE — DRESSING FOAM SELF ADH 20X10 CM ABSORBENT MEPILEX BORDER

## (undated) DEVICE — CATHETER URETH 12FR BLLN 5CC SIL ELASTMR 2 W PROTECTS FR

## (undated) DEVICE — 4.0MM LONG AO PILOT DRILL: Brand: TRIGEN

## (undated) DEVICE — SMARTGOWN SURGICAL GOWN, XL: Brand: CONVERTORS

## (undated) DEVICE — SUTURE VCRL + SZ 2-0 L18IN ABSRB UD CT1 L36MM 1/2 CIR VCP839D

## (undated) DEVICE — SUTURE VCRL + SZ 0 L18IN ABSRB UD L36MM CT-1 1/2 CIR VCP840D

## (undated) DEVICE — SUTURE MCRYL + SZ 4-0 L18IN ABSRB UD L19MM PS-2 3/8 CIR MCP496G